# Patient Record
Sex: MALE | Race: WHITE | NOT HISPANIC OR LATINO | Employment: FULL TIME | URBAN - NONMETROPOLITAN AREA
[De-identification: names, ages, dates, MRNs, and addresses within clinical notes are randomized per-mention and may not be internally consistent; named-entity substitution may affect disease eponyms.]

---

## 2017-01-31 ENCOUNTER — OFFICE VISIT (OUTPATIENT)
Dept: FAMILY MEDICINE | Facility: OTHER | Age: 52
End: 2017-01-31
Attending: FAMILY MEDICINE
Payer: COMMERCIAL

## 2017-01-31 VITALS
SYSTOLIC BLOOD PRESSURE: 122 MMHG | RESPIRATION RATE: 17 BRPM | BODY MASS INDEX: 49.73 KG/M2 | WEIGHT: 315 LBS | DIASTOLIC BLOOD PRESSURE: 80 MMHG | HEART RATE: 74 BPM | TEMPERATURE: 97 F

## 2017-01-31 DIAGNOSIS — E11.9 TYPE 2 DIABETES MELLITUS WITHOUT COMPLICATION, WITHOUT LONG-TERM CURRENT USE OF INSULIN (H): Primary | ICD-10-CM

## 2017-01-31 DIAGNOSIS — E78.00 PURE HYPERCHOLESTEROLEMIA: ICD-10-CM

## 2017-01-31 DIAGNOSIS — I10 ESSENTIAL HYPERTENSION WITH GOAL BLOOD PRESSURE LESS THAN 140/90: ICD-10-CM

## 2017-01-31 LAB
ALBUMIN SERPL-MCNC: 3.8 G/DL (ref 3.4–5)
ALP SERPL-CCNC: 74 U/L (ref 40–150)
ALT SERPL W P-5'-P-CCNC: 32 U/L (ref 0–70)
ANION GAP SERPL CALCULATED.3IONS-SCNC: 9 MMOL/L (ref 3–14)
AST SERPL W P-5'-P-CCNC: 14 U/L (ref 0–45)
BILIRUB SERPL-MCNC: 0.3 MG/DL (ref 0.2–1.3)
BUN SERPL-MCNC: 15 MG/DL (ref 7–30)
CALCIUM SERPL-MCNC: 9.5 MG/DL (ref 8.5–10.1)
CHLORIDE SERPL-SCNC: 103 MMOL/L (ref 94–109)
CO2 SERPL-SCNC: 26 MMOL/L (ref 20–32)
CREAT SERPL-MCNC: 0.84 MG/DL (ref 0.66–1.25)
EST. AVERAGE GLUCOSE BLD GHB EST-MCNC: 151 MG/DL
GFR SERPL CREATININE-BSD FRML MDRD: ABNORMAL ML/MIN/1.7M2
GLUCOSE SERPL-MCNC: 116 MG/DL (ref 70–99)
HBA1C MFR BLD: 6.9 % (ref 4.3–6)
POTASSIUM SERPL-SCNC: 3.6 MMOL/L (ref 3.4–5.3)
PROT SERPL-MCNC: 7.4 G/DL (ref 6.8–8.8)
SODIUM SERPL-SCNC: 138 MMOL/L (ref 133–144)
TSH SERPL DL<=0.05 MIU/L-ACNC: 0.78 MU/L (ref 0.4–4)

## 2017-01-31 PROCEDURE — 99214 OFFICE O/P EST MOD 30 MIN: CPT | Performed by: FAMILY MEDICINE

## 2017-01-31 PROCEDURE — 83036 HEMOGLOBIN GLYCOSYLATED A1C: CPT | Performed by: FAMILY MEDICINE

## 2017-01-31 PROCEDURE — 84443 ASSAY THYROID STIM HORMONE: CPT | Performed by: FAMILY MEDICINE

## 2017-01-31 PROCEDURE — 80053 COMPREHEN METABOLIC PANEL: CPT | Performed by: FAMILY MEDICINE

## 2017-01-31 PROCEDURE — 36415 COLL VENOUS BLD VENIPUNCTURE: CPT | Performed by: FAMILY MEDICINE

## 2017-01-31 ASSESSMENT — PAIN SCALES - GENERAL: PAINLEVEL: NO PAIN (0)

## 2017-01-31 NOTE — MR AVS SNAPSHOT
After Visit Summary   1/31/2017    Glen Rogers    MRN: [id number]           Patient Information     Date Of Birth          1965        Visit Information        Provider Department      1/31/2017 4:00 PM Zak Schrader MD [facility]        Today's Diagnoses     Type 2 diabetes mellitus without complication, without long-term current use of insulin (H)    -  1     Essential hypertension with goal blood pressure less than 140/90         Pure hypercholesterolemia           Care Instructions      Weight Management: Take It Off and Keep It Off  It s easy to be motivated when you first start. The key is to stay motivated all along the way and to have realistic and achievable goals. There are things you can do to keep yourself on the path to success.    Don t focus on daily weight gains and losses. Instead, weigh yourself no more than once a week at the same time of day.   Stay motivated    Remind yourself of your goals. Post them near the refrigerator or desk.    Make daily entries in your diary or journal about your activity and eating. A visual reminder of success, like a gold star, can help keep you going.    Every week, take time to look back on how much you ve accomplished.    Try taking a nutrition class. It can help you learn new skills and meet new people. You might try a low-fat cooking or yoga class.    Don t be hard on yourself or give up if you slip. Be patient. Learn from your mistakes and adjust your plan if you need to. Then get right back to it.    Be realistic about your goals. Make sure you can achieve them.   Believe that you can do it  How you think about yourself is just as important as what you do. If you don t think you can succeed, chances are you won t. Believe that you can stick to your plan and meet your goals.    If you don t meet a goal, don t use it as an excuse to give up on your whole plan. Adjust your goal and try again.    Learn how to accept  compliments. Even if you get embarrassed, just say  thank you.     Make a list of the things that others like about you and that you like about yourself. Add something new from time to time. Keep this list to look at when you need a lift.  Resources    The President s [place] on Fitness, Sports & Nutritionwww.fitness.gov    Academy of Nutrition and Dieteticswww.eatright.org    Healthfinderwww.healthfinder.gov    8421-0136 [employer]. [address] [place] [address], [place] [address]. All rights reserved. This information is not intended as a substitute for professional medical care. Always follow your healthcare professional's instructions.        Weight Management: Overcoming Your Barriers  You may have many reasons why you re not ready to lose weight. You may not feel you have the time or the skills. You may be afraid of losing weight and gaining it back again. Well, you can lose weight. And you can keep the weight off, if you make changes slowly and stick with them. Remember that you may never find the perfect time to lose weight. Decide that the right time to be healthier is now.    Common barriers  Barrier 1: I don t want to deny myself.  Barrier Buster: You don t have to! Moderation is the key.    Watch portion sizes and know when you're eating more than one serving.    Plan to ask for a doggy bag when you eat out.    Have just one.    Choose lower-fat and lower-calorie versions of your favorites.  Barrier 2: I lost weight before but I gained it right back.  Barrier Buster: Make this time different.    List what worked and didn t work last time and what you can try this time.    Choose changes that you are willing to stick with.    Work exercise into your weight-loss plan.    Be realistic about what is possible.   Barrier 3: I don t have the time to be active.  Barrier Buster: It takes just a few minutes a day!    Be active with a pet or the kids.    Block off activity time in your schedule.    Borrow  some time that you usually spend watching TV.    You are too important not to take time to exercise -- it is your life!   Feel good about yourself  Do you eat more because you feel bad about yourself, then feel even worse as you gain weight? This is a  vicious cycle.  Breaking this cycle is not easy. You may need group support or counseling. Always remember that you are a valuable person, no matter what size or shape you are.  Do you have a health problem? If so, don t use it as an excuse for not losing weight. Ask your doctor, dietitian, or other health care provider about methods to lose weight that are safe for you. For example, even if you have severe arthritis, you can walk in a pool. Get advice from a .      3855-0141 [employer]. [address] [place] [address], [place] [address]. All rights reserved. This information is not intended as a substitute for professional medical care. Always follow your healthcare professional's instructions.        Weight Management: Fact and Fiction  Knowing the truth about losing weight can help you separate what works from what doesn t. Don t be taken in by expensive weight-loss fads like pills, herbs, and special foods that promise unbelievable results. There s no magic way to lose weight. If you have questions about weight loss, ask your health care provider.    Fiction:  The faster I lose weight, the better.   Fact: Rapid weight loss is usually due to loss of water or muscle mass. What you re trying to get rid of is extra fat. Aim to lose a 1/2 pound to 2 pounds a week. Then you re more likely to lose fat rather than water or muscle.  Fiction:  Skipping meals will help me lose weight.   Fact: When you skip meals, you don t give your body the energy it needs to work. Hunger makes you more likely to overeat later on. It s best to spread your meals throughout the day. Eat at least 3 meals a day.  Fiction:  I can t start exercising until I lose  weight.   Fact: The sooner you start exercising the better. Exercise helps burn more calories, tone your muscles, and keep your appetite in check. People who continue to exercise after they lose weight are more likely to keep the weight off.  Fiction:  The fewer calories I eat, the better.   Fact: This seems like it should be true, but it s not. When you eat too few calories, your body acts as if it s on a desert island. It thinks food is scarce, so it slows down your metabolism (how fast you burn calories) to save energy. By eating too few calories, you make it harder to lose weight.  Fiction:  Once I lose weight, I can go back to living the way I did before.   Fact: Going back to your old eating habits and giving up exercise is a sure way to regain any weight you ve lost. The lifestyle changes that help you lose extra weight can also help keep it off. This is why you need to make realistic changes you can stick with.  Fiction:  Low-fat and fat-free mean low-calorie.   Fact: All foods, even fat-free ones, have calories. Eat too many calories and you ll gain weight. It s OK to treat yourself to a fat-free cookie or 2. Just don t eat the whole box!    8113-1030 [employer]. [address] [place] [address], [place] [address]. All rights reserved. This information is not intended as a substitute for professional medical care. Always follow your healthcare professional's instructions.      Results for orders placed or performed in visit on 01/31/17 (from the past 24 hour(s))   Comprehensive metabolic panel   Result Value Ref Range    Sodium 138 133 - 144 mmol/L    Potassium 3.6 3.4 - 5.3 mmol/L    Chloride 103 94 - 109 mmol/L    Carbon Dioxide 26 20 - 32 mmol/L    Anion Gap 9 3 - 14 mmol/L    Glucose 116 (H) 70 - 99 mg/dL    Urea Nitrogen 15 7 - 30 mg/dL    Creatinine 0.84 0.66 - 1.25 mg/dL    GFR Estimate >90  Non  GFR Calc   >60 mL/min/1.7m2    GFR Estimate If Black >90   GFR  "Calc   >60 mL/min/1.7m2    Calcium 9.5 8.5 - 10.1 mg/dL    Bilirubin Total 0.3 0.2 - 1.3 mg/dL    Albumin 3.8 3.4 - 5.0 g/dL    Protein Total 7.4 6.8 - 8.8 g/dL    Alkaline Phosphatase 74 40 - 150 U/L    ALT 32 0 - 70 U/L    AST 14 0 - 45 U/L   Hemoglobin A1c   Result Value Ref Range    Hemoglobin A1C 6.9 (H) 4.3 - 6.0 %   TSH   Result Value Ref Range    TSH 0.78 0.40 - 4.00 mU/L   Estimated Average Glucose   Result Value Ref Range    Estimated Average Glucose 151 mg/dL             Follow-ups after your visit        Who to contact     If you have questions or need follow up information about today's clinic visit or your schedule please contact [facility] directly at [phone].  Normal or non-critical lab and imaging results will be communicated to you by [email or web address], letter or phone within 4 business days after the clinic has received the results. If you do not hear from us within 7 days, please contact the clinic through [facility] or phone. If you have a critical or abnormal lab result, we will notify you by phone as soon as possible.  Submit refill requests through [facility] or call your pharmacy and they will forward the refill request to us. Please allow 3 business days for your refill to be completed.          Additional Information About Your Visit        [email or web address] Information     [employer] lets you send messages to your doctor, view your test results, renew your prescriptions, schedule appointments and more. To sign up, go to [email or web address] . Click on \"Log in\" on the left side of the screen, which will take you to the Welcome page. Then click on \"Sign up Now\" on the right side of the page.     You will be asked to enter the access code listed below, as well as some personal information. Please follow the directions to create your username and password.     Your access code is: [email or web address]  Expires: 2017  4:33 PM     Your access code will  in 90 days. If you need help or a new code, " please call your [facility] clinic or [phone].        Care EveryWhere ID     This is your Care EveryWhere ID. This could be used by other organizations to access your [facility] medical records  [id number]        Your Vitals Were     Pulse Temperature Respirations             74 97  F (36.1  C) 17          Blood Pressure from Last 3 Encounters:   01/31/17 122/80   10/28/16 124/74   07/11/16 132/82    Weight from Last 3 Encounters:   01/31/17 372 lb (168.738 kg)   10/28/16 360 lb (163.295 kg)   07/11/16 363 lb (164.656 kg)              We Performed the Following     Comprehensive metabolic panel     Estimated Average Glucose     Hemoglobin A1c     TSH        Primary Care Provider Office Phone # Fax #    Zak Schrader [phone]       [facility] [address] [facility]  [facility] [address]        Thank you!     Thank you for choosing [facility]  for your care. Our goal is always to provide you with excellent care. Hearing back from our patients is one way we can continue to improve our services. Please take a few minutes to complete the written survey that you may receive in the mail after your visit with us. Thank you!             Your Updated Medication List - Protect others around you: Learn how to safely use, store and throw away your medicines at www.disposemymeds.org.          This list is accurate as of: 1/31/17  4:33 PM.  Always use your most recent med list.                   Brand Name Dispense Instructions for use    blood glucose monitoring lancets     1 Box    Use to test blood sugar 3 times daily or as directed.       blood glucose monitoring test strip    DORIAN CONTOUR NEXT    2 Box    Use to test blood sugar 3 times daily or as directed.       losartan-hydrochlorothiazide 50-12.5 MG per tablet    HYZAAR    30 tablet    TAKE 1 TABLET BY MOUTH DAILY       metFORMIN 500 MG 24 hr tablet    GLUCOPHAGE-XR    180 tablet    Take 2 tablets (1,000 mg) by mouth daily (with  dinner)       simvastatin 20 MG tablet    ZOCOR    90 tablet    Take 1 tablet (20 mg) by mouth At Bedtime

## 2017-01-31 NOTE — NURSING NOTE
"Chief Complaint   Patient presents with     Diabetes     Hypertension     Lipids       Initial /79 mmHg  Pulse 74  Temp(Src) 97  F (36.1  C)  Resp 17  Wt 372 lb (168.738 kg) Estimated body mass index is 49.73 kg/(m^2) as calculated from the following:    Height as of 7/11/16: 6' 0.5\" (1.842 m).    Weight as of this encounter: 372 lb (168.738 kg).  BP completed using cuff size: large  "

## 2017-01-31 NOTE — PATIENT INSTRUCTIONS
Weight Management: Take It Off and Keep It Off  It s easy to be motivated when you first start. The key is to stay motivated all along the way and to have realistic and achievable goals. There are things you can do to keep yourself on the path to success.    Don t focus on daily weight gains and losses. Instead, weigh yourself no more than once a week at the same time of day.   Stay motivated    Remind yourself of your goals. Post them near the refrigerator or desk.    Make daily entries in your diary or journal about your activity and eating. A visual reminder of success, like a gold star, can help keep you going.    Every week, take time to look back on how much you ve accomplished.    Try taking a nutrition class. It can help you learn new skills and meet new people. You might try a low-fat cooking or yoga class.    Don t be hard on yourself or give up if you slip. Be patient. Learn from your mistakes and adjust your plan if you need to. Then get right back to it.    Be realistic about your goals. Make sure you can achieve them.   Believe that you can do it  How you think about yourself is just as important as what you do. If you don t think you can succeed, chances are you won t. Believe that you can stick to your plan and meet your goals.    If you don t meet a goal, don t use it as an excuse to give up on your whole plan. Adjust your goal and try again.    Learn how to accept compliments. Even if you get embarrassed, just say  thank you.     Make a list of the things that others like about you and that you like about yourself. Add something new from time to time. Keep this list to look at when you need a lift.  Resources    The President s [place] on Fitness, Sports & Nutritionwww.fitness.gov    Academy of Nutrition and Dieteticswww.eatright.org    Healthfinderwww.healthfinder.gov    8278-2033 [employer]. [address] [place] [address], [place] [address]. All rights reserved. This information is not  intended as a substitute for professional medical care. Always follow your healthcare professional's instructions.        Weight Management: Overcoming Your Barriers  You may have many reasons why you re not ready to lose weight. You may not feel you have the time or the skills. You may be afraid of losing weight and gaining it back again. Well, you can lose weight. And you can keep the weight off, if you make changes slowly and stick with them. Remember that you may never find the perfect time to lose weight. Decide that the right time to be healthier is now.    Common barriers  Barrier 1: I don t want to deny myself.  Barrier Buster: You don t have to! Moderation is the key.    Watch portion sizes and know when you're eating more than one serving.    Plan to ask for a doggy bag when you eat out.    Have just one.    Choose lower-fat and lower-calorie versions of your favorites.  Barrier 2: I lost weight before but I gained it right back.  Barrier Buster: Make this time different.    List what worked and didn t work last time and what you can try this time.    Choose changes that you are willing to stick with.    Work exercise into your weight-loss plan.    Be realistic about what is possible.   Barrier 3: I don t have the time to be active.  Barrier Buster: It takes just a few minutes a day!    Be active with a pet or the kids.    Block off activity time in your schedule.    Borrow some time that you usually spend watching TV.    You are too important not to take time to exercise -- it is your life!   Feel good about yourself  Do you eat more because you feel bad about yourself, then feel even worse as you gain weight? This is a  vicious cycle.  Breaking this cycle is not easy. You may need group support or counseling. Always remember that you are a valuable person, no matter what size or shape you are.  Do you have a health problem? If so, don t use it as an excuse for not losing weight. Ask your doctor,  dietitian, or other health care provider about methods to lose weight that are safe for you. For example, even if you have severe arthritis, you can walk in a pool. Get advice from a .      9405-9720 [employer]. [address] [place] [address], [place] [address]. All rights reserved. This information is not intended as a substitute for professional medical care. Always follow your healthcare professional's instructions.        Weight Management: Fact and Fiction  Knowing the truth about losing weight can help you separate what works from what doesn t. Don t be taken in by expensive weight-loss fads like pills, herbs, and special foods that promise unbelievable results. There s no magic way to lose weight. If you have questions about weight loss, ask your health care provider.    Fiction:  The faster I lose weight, the better.   Fact: Rapid weight loss is usually due to loss of water or muscle mass. What you re trying to get rid of is extra fat. Aim to lose a 1/2 pound to 2 pounds a week. Then you re more likely to lose fat rather than water or muscle.  Fiction:  Skipping meals will help me lose weight.   Fact: When you skip meals, you don t give your body the energy it needs to work. Hunger makes you more likely to overeat later on. It s best to spread your meals throughout the day. Eat at least 3 meals a day.  Fiction:  I can t start exercising until I lose weight.   Fact: The sooner you start exercising the better. Exercise helps burn more calories, tone your muscles, and keep your appetite in check. People who continue to exercise after they lose weight are more likely to keep the weight off.  Fiction:  The fewer calories I eat, the better.   Fact: This seems like it should be true, but it s not. When you eat too few calories, your body acts as if it s on a desert island. It thinks food is scarce, so it slows down your metabolism (how fast you burn calories) to save energy. By eating too  few calories, you make it harder to lose weight.  Fiction:  Once I lose weight, I can go back to living the way I did before.   Fact: Going back to your old eating habits and giving up exercise is a sure way to regain any weight you ve lost. The lifestyle changes that help you lose extra weight can also help keep it off. This is why you need to make realistic changes you can stick with.  Fiction:  Low-fat and fat-free mean low-calorie.   Fact: All foods, even fat-free ones, have calories. Eat too many calories and you ll gain weight. It s OK to treat yourself to a fat-free cookie or 2. Just don t eat the whole box!    8133-1967 [employer]. [address] [place] [address], [place] [address]. All rights reserved. This information is not intended as a substitute for professional medical care. Always follow your healthcare professional's instructions.      Results for orders placed or performed in visit on 01/31/17 (from the past 24 hour(s))   Comprehensive metabolic panel   Result Value Ref Range    Sodium 138 133 - 144 mmol/L    Potassium 3.6 3.4 - 5.3 mmol/L    Chloride 103 94 - 109 mmol/L    Carbon Dioxide 26 20 - 32 mmol/L    Anion Gap 9 3 - 14 mmol/L    Glucose 116 (H) 70 - 99 mg/dL    Urea Nitrogen 15 7 - 30 mg/dL    Creatinine 0.84 0.66 - 1.25 mg/dL    GFR Estimate >90  Non  GFR Calc   >60 mL/min/1.7m2    GFR Estimate If Black >90   GFR Calc   >60 mL/min/1.7m2    Calcium 9.5 8.5 - 10.1 mg/dL    Bilirubin Total 0.3 0.2 - 1.3 mg/dL    Albumin 3.8 3.4 - 5.0 g/dL    Protein Total 7.4 6.8 - 8.8 g/dL    Alkaline Phosphatase 74 40 - 150 U/L    ALT 32 0 - 70 U/L    AST 14 0 - 45 U/L   Hemoglobin A1c   Result Value Ref Range    Hemoglobin A1C 6.9 (H) 4.3 - 6.0 %   TSH   Result Value Ref Range    TSH 0.78 0.40 - 4.00 mU/L   Estimated Average Glucose   Result Value Ref Range    Estimated Average Glucose 151 mg/dL

## 2017-01-31 NOTE — PROGRESS NOTES
SUBJECTIVE:                                                    Glen Rogers is a 51 year old male who presents to clinic today for the following health issues:        Diabetes Follow-up      Patient is checking blood sugars: not at all    Diabetic concerns: None     Symptoms of hypoglycemia (low blood sugar): none     Paresthesias (numbness or burning in feet) or sores: No     Date of last diabetic eye exam:over 1 year      Hyperlipidemia Follow-Up      Rate your low fat/cholesterol diet?: fair    Taking statin?  Yes, no muscle aches from statin    Other lipid medications/supplements?:  none     Hypertension Follow-up      Outpatient blood pressures are not being checked.    Low Salt Diet: no added salt         Amount of exercise or physical activity: None    Problems taking medications regularly: No    Medication side effects: none    Diet: regular (no restrictions)        Problem list and histories reviewed & adjusted, as indicated.  Additional history: as documented    Problem list, Medication list, Allergies, and Medical/Social/Surgical histories reviewed in EPIC and updated as appropriate.    ROS:  C: NEGATIVE for fever, chills, change in weight  R: NEGATIVE for significant cough or SOB  CV: NEGATIVE for chest pain, palpitations or peripheral edema    OBJECTIVE:                                                    /80 mmHg  Pulse 74  Temp(Src) 97  F (36.1  C)  Resp 17  Wt 372 lb (168.738 kg)  Body mass index is 49.73 kg/(m^2).   GENERAL: healthy, alert, well nourished, well hydrated, no distress  NECK: no tenderness, no adenopathy, no asymmetry, no masses, no stiffness; thyroid- normal to palpation  RESP: lungs clear to auscultation - no rales, no rhonchi, no wheezes  CV: regular rates and rhythm, normal S1 S2, no S3 or S4 and no murmur, no click or rub -             ASSESSMENT/PLAN:                                                    1. Type 2 diabetes mellitus without complication, without  long-term current use of insulin (H)  Eye exam is due- discussed. Good control overall. WT is climbing though- discussed. Continue current medications and behavioral changes.   F/u in 4 -5months with fasting labs.   - Comprehensive metabolic panel; Future  - Lipid Profile; Future  - Hemoglobin A1c; Future  - TSH; Future  - Comprehensive metabolic panel  - Hemoglobin A1c  - TSH    2. Essential hypertension with goal blood pressure less than 140/90  Overall good control - Continue current medications and behavioral changes.   - Comprehensive metabolic panel; Future  - Lipid Profile; Future  - Hemoglobin A1c; Future  - TSH; Future  - Comprehensive metabolic panel  - Hemoglobin A1c  - TSH    3. Pure hypercholesterolemia  On statin - will check lipids next time - not fasting today   - Comprehensive metabolic panel; Future  - Lipid Profile; Future  - Hemoglobin A1c; Future  - TSH; Future  - Comprehensive metabolic panel  - Hemoglobin A1c  - TSH    Obesity - wt is climbing. Discussed behavioral changes to improve obesity including increase diet, decreasing carbs along with other changes in diet and consider seeing dietician or enroll in a watch watcher's type program.     See Patient Instructions    Zak Schrader MD  [facility]

## 2017-04-03 DIAGNOSIS — E11.9 TYPE 2 DIABETES MELLITUS WITHOUT COMPLICATION (H): ICD-10-CM

## 2017-04-05 NOTE — TELEPHONE ENCOUNTER
Metformin         Last Written Prescription Date: 12/12/2016  Last Fill Quantity: 180, # refills: 0  Last Office Visit with G, P or [facility] prescribing provider:  01/31/2017        BP Readings from Last 3 Encounters:   01/31/17 122/80   10/28/16 124/74   07/11/16 132/82     No results found for: MICROL  No results found for: UMALCR  Creatinine   Date Value Ref Range Status   01/31/2017 0.84 0.66 - 1.25 mg/dL Final   ]  GFR Estimate   Date Value Ref Range Status   01/31/2017 >90  Non  GFR Calc   >60 mL/min/1.7m2 Final   04/06/2016 >90  Non  GFR Calc   >60 mL/min/1.7m2 Final   07/20/2015 >90  Non  GFR Calc   >60 mL/min/1.7m2 Final     GFR Estimate If Black   Date Value Ref Range Status   01/31/2017 >90   GFR Calc   >60 mL/min/1.7m2 Final   04/06/2016 >90   GFR Calc   >60 mL/min/1.7m2 Final   07/20/2015 >90   GFR Calc   >60 mL/min/1.7m2 Final     Lab Results   Component Value Date    CHOL 163 07/11/2016     Lab Results   Component Value Date    HDL 49 07/11/2016     Lab Results   Component Value Date    LDL 88 07/11/2016     Lab Results   Component Value Date    TRIG 131 07/11/2016     Lab Results   Component Value Date    CHOLHDLRATIO 3.7 07/20/2015     Lab Results   Component Value Date    AST 14 01/31/2017     Lab Results   Component Value Date    ALT 32 01/31/2017     Lab Results   Component Value Date    A1C 6.9 01/31/2017    A1C 6.8 10/28/2016    A1C 6.4 07/11/2016    A1C 7.0 04/06/2016    A1C 6.6 11/23/2015     Potassium   Date Value Ref Range Status   01/31/2017 3.6 3.4 - 5.3 mmol/L Final

## 2017-04-06 RX ORDER — METFORMIN HCL 500 MG
TABLET, EXTENDED RELEASE 24 HR ORAL
Qty: 180 TABLET | Refills: 0 | Status: SHIPPED | OUTPATIENT
Start: 2017-04-06 | End: 2017-07-10

## 2017-05-18 DIAGNOSIS — I10 ESSENTIAL HYPERTENSION: ICD-10-CM

## 2017-05-19 RX ORDER — LOSARTAN POTASSIUM AND HYDROCHLOROTHIAZIDE 12.5; 5 MG/1; MG/1
TABLET ORAL
Qty: 30 TABLET | Refills: 5 | Status: SHIPPED | OUTPATIENT
Start: 2017-05-19 | End: 2017-07-10

## 2017-07-10 ENCOUNTER — OFFICE VISIT (OUTPATIENT)
Dept: FAMILY MEDICINE | Facility: OTHER | Age: 52
End: 2017-07-10
Attending: FAMILY MEDICINE
Payer: COMMERCIAL

## 2017-07-10 VITALS
HEIGHT: 72 IN | HEART RATE: 78 BPM | BODY MASS INDEX: 42.66 KG/M2 | WEIGHT: 315 LBS | SYSTOLIC BLOOD PRESSURE: 132 MMHG | TEMPERATURE: 97.7 F | DIASTOLIC BLOOD PRESSURE: 82 MMHG

## 2017-07-10 DIAGNOSIS — E66.01 MORBID OBESITY DUE TO EXCESS CALORIES (H): ICD-10-CM

## 2017-07-10 DIAGNOSIS — E78.00 PURE HYPERCHOLESTEROLEMIA: ICD-10-CM

## 2017-07-10 DIAGNOSIS — I10 ESSENTIAL HYPERTENSION WITH GOAL BLOOD PRESSURE LESS THAN 140/90: ICD-10-CM

## 2017-07-10 DIAGNOSIS — E11.9 TYPE 2 DIABETES MELLITUS WITHOUT COMPLICATION, WITHOUT LONG-TERM CURRENT USE OF INSULIN (H): Primary | ICD-10-CM

## 2017-07-10 DIAGNOSIS — I10 ESSENTIAL HYPERTENSION: ICD-10-CM

## 2017-07-10 LAB
ALBUMIN SERPL-MCNC: 3.9 G/DL (ref 3.4–5)
ALP SERPL-CCNC: 79 U/L (ref 40–150)
ALT SERPL W P-5'-P-CCNC: 39 U/L (ref 0–70)
ANION GAP SERPL CALCULATED.3IONS-SCNC: 7 MMOL/L (ref 3–14)
AST SERPL W P-5'-P-CCNC: 17 U/L (ref 0–45)
BASOPHILS # BLD AUTO: 0 10E9/L (ref 0–0.2)
BASOPHILS NFR BLD AUTO: 0.4 %
BILIRUB SERPL-MCNC: 0.5 MG/DL (ref 0.2–1.3)
BUN SERPL-MCNC: 12 MG/DL (ref 7–30)
CALCIUM SERPL-MCNC: 9.5 MG/DL (ref 8.5–10.1)
CHLORIDE SERPL-SCNC: 105 MMOL/L (ref 94–109)
CHOLEST SERPL-MCNC: 192 MG/DL
CO2 SERPL-SCNC: 27 MMOL/L (ref 20–32)
CREAT SERPL-MCNC: 0.87 MG/DL (ref 0.66–1.25)
CREAT UR-MCNC: 130 MG/DL
DIFFERENTIAL METHOD BLD: NORMAL
EOSINOPHIL # BLD AUTO: 0.2 10E9/L (ref 0–0.7)
EOSINOPHIL NFR BLD AUTO: 1.9 %
ERYTHROCYTE [DISTWIDTH] IN BLOOD BY AUTOMATED COUNT: 14.1 % (ref 10–15)
EST. AVERAGE GLUCOSE BLD GHB EST-MCNC: 148 MG/DL
GFR SERPL CREATININE-BSD FRML MDRD: ABNORMAL ML/MIN/1.7M2
GLUCOSE SERPL-MCNC: 104 MG/DL (ref 70–99)
HBA1C MFR BLD: 6.8 % (ref 4.3–6)
HCT VFR BLD AUTO: 40.7 % (ref 40–53)
HDLC SERPL-MCNC: 55 MG/DL
HGB BLD-MCNC: 14.1 G/DL (ref 13.3–17.7)
IMM GRANULOCYTES # BLD: 0 10E9/L (ref 0–0.4)
IMM GRANULOCYTES NFR BLD: 0.2 %
LDLC SERPL CALC-MCNC: 120 MG/DL
LYMPHOCYTES # BLD AUTO: 2.3 10E9/L (ref 0.8–5.3)
LYMPHOCYTES NFR BLD AUTO: 28 %
MCH RBC QN AUTO: 29.9 PG (ref 26.5–33)
MCHC RBC AUTO-ENTMCNC: 34.6 G/DL (ref 31.5–36.5)
MCV RBC AUTO: 86 FL (ref 78–100)
MICROALBUMIN UR-MCNC: 7 MG/L
MICROALBUMIN/CREAT UR: 5.32 MG/G CR (ref 0–17)
MONOCYTES # BLD AUTO: 0.6 10E9/L (ref 0–1.3)
MONOCYTES NFR BLD AUTO: 6.6 %
NEUTROPHILS # BLD AUTO: 5.2 10E9/L (ref 1.6–8.3)
NEUTROPHILS NFR BLD AUTO: 62.9 %
NONHDLC SERPL-MCNC: 137 MG/DL
NRBC # BLD AUTO: 0 10*3/UL
NRBC BLD AUTO-RTO: 0 /100
PLATELET # BLD AUTO: 264 10E9/L (ref 150–450)
POTASSIUM SERPL-SCNC: 4 MMOL/L (ref 3.4–5.3)
PROT SERPL-MCNC: 7.5 G/DL (ref 6.8–8.8)
RBC # BLD AUTO: 4.72 10E12/L (ref 4.4–5.9)
SODIUM SERPL-SCNC: 139 MMOL/L (ref 133–144)
TRIGL SERPL-MCNC: 84 MG/DL
WBC # BLD AUTO: 8.3 10E9/L (ref 4–11)

## 2017-07-10 PROCEDURE — 82043 UR ALBUMIN QUANTITATIVE: CPT | Performed by: FAMILY MEDICINE

## 2017-07-10 PROCEDURE — 36415 COLL VENOUS BLD VENIPUNCTURE: CPT | Performed by: FAMILY MEDICINE

## 2017-07-10 PROCEDURE — 80053 COMPREHEN METABOLIC PANEL: CPT | Performed by: FAMILY MEDICINE

## 2017-07-10 PROCEDURE — 85025 COMPLETE CBC W/AUTO DIFF WBC: CPT | Performed by: FAMILY MEDICINE

## 2017-07-10 PROCEDURE — 99214 OFFICE O/P EST MOD 30 MIN: CPT | Performed by: FAMILY MEDICINE

## 2017-07-10 PROCEDURE — 83036 HEMOGLOBIN GLYCOSYLATED A1C: CPT | Performed by: FAMILY MEDICINE

## 2017-07-10 PROCEDURE — 80061 LIPID PANEL: CPT | Performed by: FAMILY MEDICINE

## 2017-07-10 RX ORDER — SIMVASTATIN 20 MG
20 TABLET ORAL AT BEDTIME
Qty: 90 TABLET | Refills: 3 | Status: CANCELLED | OUTPATIENT
Start: 2017-07-10

## 2017-07-10 RX ORDER — LOSARTAN POTASSIUM AND HYDROCHLOROTHIAZIDE 12.5; 5 MG/1; MG/1
1 TABLET ORAL DAILY
Qty: 90 TABLET | Refills: 1 | Status: SHIPPED | OUTPATIENT
Start: 2017-07-10 | End: 2018-01-05

## 2017-07-10 RX ORDER — METFORMIN HCL 500 MG
1000 TABLET, EXTENDED RELEASE 24 HR ORAL
Qty: 180 TABLET | Refills: 1 | Status: SHIPPED | OUTPATIENT
Start: 2017-07-10 | End: 2018-04-17

## 2017-07-10 RX ORDER — ATORVASTATIN CALCIUM 10 MG/1
10 TABLET, FILM COATED ORAL DAILY
Qty: 90 TABLET | Refills: 1 | Status: SHIPPED | OUTPATIENT
Start: 2017-07-10 | End: 2018-01-05

## 2017-07-10 ASSESSMENT — ANXIETY QUESTIONNAIRES
IF YOU CHECKED OFF ANY PROBLEMS ON THIS QUESTIONNAIRE, HOW DIFFICULT HAVE THESE PROBLEMS MADE IT FOR YOU TO DO YOUR WORK, TAKE CARE OF THINGS AT HOME, OR GET ALONG WITH OTHER PEOPLE: SOMEWHAT DIFFICULT
GAD7 TOTAL SCORE: 9
7. FEELING AFRAID AS IF SOMETHING AWFUL MIGHT HAPPEN: SEVERAL DAYS
2. NOT BEING ABLE TO STOP OR CONTROL WORRYING: MORE THAN HALF THE DAYS
1. FEELING NERVOUS, ANXIOUS, OR ON EDGE: SEVERAL DAYS
5. BEING SO RESTLESS THAT IT IS HARD TO SIT STILL: NOT AT ALL
3. WORRYING TOO MUCH ABOUT DIFFERENT THINGS: MORE THAN HALF THE DAYS
6. BECOMING EASILY ANNOYED OR IRRITABLE: MORE THAN HALF THE DAYS

## 2017-07-10 ASSESSMENT — PATIENT HEALTH QUESTIONNAIRE - PHQ9: 5. POOR APPETITE OR OVEREATING: SEVERAL DAYS

## 2017-07-10 ASSESSMENT — PAIN SCALES - GENERAL: PAINLEVEL: NO PAIN (0)

## 2017-07-10 NOTE — PATIENT INSTRUCTIONS
Results for orders placed or performed in visit on 07/10/17 (from the past 24 hour(s))   CBC with platelets differential   Result Value Ref Range    WBC 8.3 4.0 - 11.0 10e9/L    RBC Count 4.72 4.4 - 5.9 10e12/L    Hemoglobin 14.1 13.3 - 17.7 g/dL    Hematocrit 40.7 40.0 - 53.0 %    MCV 86 78 - 100 fl    MCH 29.9 26.5 - 33.0 pg    MCHC 34.6 31.5 - 36.5 g/dL    RDW 14.1 10.0 - 15.0 %    Platelet Count 264 150 - 450 10e9/L    Diff Method Automated Method     % Neutrophils 62.9 %    % Lymphocytes 28.0 %    % Monocytes 6.6 %    % Eosinophils 1.9 %    % Basophils 0.4 %    % Immature Granulocytes 0.2 %    Nucleated RBCs 0 0 /100    Absolute Neutrophil 5.2 1.6 - 8.3 10e9/L    Absolute Lymphocytes 2.3 0.8 - 5.3 10e9/L    Absolute Monocytes 0.6 0.0 - 1.3 10e9/L    Absolute Eosinophils 0.2 0.0 - 0.7 10e9/L    Absolute Basophils 0.0 0.0 - 0.2 10e9/L    Abs Immature Granulocytes 0.0 0 - 0.4 10e9/L    Absolute Nucleated RBC 0.0    Comprehensive metabolic panel   Result Value Ref Range    Sodium 139 133 - 144 mmol/L    Potassium 4.0 3.4 - 5.3 mmol/L    Chloride 105 94 - 109 mmol/L    Carbon Dioxide 27 20 - 32 mmol/L    Anion Gap 7 3 - 14 mmol/L    Glucose 104 (H) 70 - 99 mg/dL    Urea Nitrogen 12 7 - 30 mg/dL    Creatinine 0.87 0.66 - 1.25 mg/dL    GFR Estimate >90  Non  GFR Calc   >60 mL/min/1.7m2    GFR Estimate If Black >90   GFR Calc   >60 mL/min/1.7m2    Calcium 9.5 8.5 - 10.1 mg/dL    Bilirubin Total 0.5 0.2 - 1.3 mg/dL    Albumin 3.9 3.4 - 5.0 g/dL    Protein Total 7.5 6.8 - 8.8 g/dL    Alkaline Phosphatase 79 40 - 150 U/L    ALT 39 0 - 70 U/L    AST 17 0 - 45 U/L   Lipid Profile   Result Value Ref Range    Cholesterol 192 <200 mg/dL    Triglycerides 84 <150 mg/dL    HDL Cholesterol 55 >39 mg/dL    LDL Cholesterol Calculated 120 (H) <100 mg/dL    Non HDL Cholesterol 137 (H) <130 mg/dL   Hemoglobin A1c   Result Value Ref Range    Hemoglobin A1C 6.8 (H) 4.3 - 6.0 %   Estimated Average Glucose    Result Value Ref Range    Estimated Average Glucose 148 mg/dL

## 2017-07-10 NOTE — NURSING NOTE
Chief Complaint   Patient presents with     Diabetes     Lipids       Initial /82  Pulse 78  Temp 97.7  F (36.5  C)  Ht 6' (1.829 m)  Wt (!) 340 lb (154.2 kg)  BMI 46.11 kg/m2 Estimated body mass index is 46.11 kg/(m^2) as calculated from the following:    Height as of this encounter: 6' (1.829 m).    Weight as of this encounter: 340 lb (154.2 kg).  Medication Reconciliation: complete     Steven Deras

## 2017-07-10 NOTE — PROGRESS NOTES
SUBJECTIVE:                                                    Glen Rogers is a 51 year old male who presents to clinic today for the following health issues:      Diabetes Follow-up      Patient is checking blood sugars: not at all    Diabetic concerns: None     Symptoms of hypoglycemia (low blood sugar): none     Paresthesias (numbness or burning in feet) or sores: No     Date of last diabetic eye exam: few yaers    Hyperlipidemia Follow-Up      Rate your low fat/cholesterol diet?: fair, has not been taking statin on regular basis, has a hard time walking and feels fatigue.    Taking statin?  Yes, muscle aches after starting statin    Other lipid medications/supplements?:  None    Stopped zocor and muscle pain and stiffness improved by 50-60%    Hypertension Follow-up      Outpatient blood pressures are not being checked.    Low Salt Diet: no added salt      Amount of exercise or physical activity: 2-3 days/week for an average of 45-60 minutes    Problems taking medications regularly: Yes,  side effects    Medication side effects: muscle aches    Diet: carbohydrate counting          Problem list and histories reviewed & adjusted, as indicated.  Additional history: as documented    Labs reviewed in EPIC    Reviewed and updated as needed this visit by clinical staff  Tobacco  Allergies  Meds  Med Hx  Surg Hx  Fam Hx  Soc Hx      Reviewed and updated as needed this visit by Provider         ROS:  C: NEGATIVE for fever, chills, change in weight  R: NEGATIVE for significant cough or SOB  CV: NEGATIVE for chest pain, palpitations or peripheral edema    OBJECTIVE:                                                    /82  Pulse 78  Temp 97.7  F (36.5  C)  Ht 6' (1.829 m)  Wt (!) 340 lb (154.2 kg)  BMI 46.11 kg/m2  Body mass index is 46.11 kg/(m^2).   GENERAL: healthy, alert, well nourished, well hydrated, no distress  NECK: no tenderness, no adenopathy, no asymmetry, no masses, no stiffness; thyroid-  normal to palpation  RESP: lungs clear to auscultation - no rales, no rhonchi, no wheezes  CV: regular rates and rhythm, normal S1 S2, no S3 or S4 and no murmur, no click or rub -  ABDOMEN: soft, no tenderness, no  hepatosplenomegaly, no masses, normal bowel sounds    Results for orders placed or performed in visit on 07/10/17 (from the past 24 hour(s))   CBC with platelets differential   Result Value Ref Range    WBC 8.3 4.0 - 11.0 10e9/L    RBC Count 4.72 4.4 - 5.9 10e12/L    Hemoglobin 14.1 13.3 - 17.7 g/dL    Hematocrit 40.7 40.0 - 53.0 %    MCV 86 78 - 100 fl    MCH 29.9 26.5 - 33.0 pg    MCHC 34.6 31.5 - 36.5 g/dL    RDW 14.1 10.0 - 15.0 %    Platelet Count 264 150 - 450 10e9/L    Diff Method Automated Method     % Neutrophils 62.9 %    % Lymphocytes 28.0 %    % Monocytes 6.6 %    % Eosinophils 1.9 %    % Basophils 0.4 %    % Immature Granulocytes 0.2 %    Nucleated RBCs 0 0 /100    Absolute Neutrophil 5.2 1.6 - 8.3 10e9/L    Absolute Lymphocytes 2.3 0.8 - 5.3 10e9/L    Absolute Monocytes 0.6 0.0 - 1.3 10e9/L    Absolute Eosinophils 0.2 0.0 - 0.7 10e9/L    Absolute Basophils 0.0 0.0 - 0.2 10e9/L    Abs Immature Granulocytes 0.0 0 - 0.4 10e9/L    Absolute Nucleated RBC 0.0    Comprehensive metabolic panel   Result Value Ref Range    Sodium 139 133 - 144 mmol/L    Potassium 4.0 3.4 - 5.3 mmol/L    Chloride 105 94 - 109 mmol/L    Carbon Dioxide 27 20 - 32 mmol/L    Anion Gap 7 3 - 14 mmol/L    Glucose 104 (H) 70 - 99 mg/dL    Urea Nitrogen 12 7 - 30 mg/dL    Creatinine 0.87 0.66 - 1.25 mg/dL    GFR Estimate >90  Non  GFR Calc   >60 mL/min/1.7m2    GFR Estimate If Black >90   GFR Calc   >60 mL/min/1.7m2    Calcium 9.5 8.5 - 10.1 mg/dL    Bilirubin Total 0.5 0.2 - 1.3 mg/dL    Albumin 3.9 3.4 - 5.0 g/dL    Protein Total 7.5 6.8 - 8.8 g/dL    Alkaline Phosphatase 79 40 - 150 U/L    ALT 39 0 - 70 U/L    AST 17 0 - 45 U/L   Lipid Profile   Result Value Ref Range    Cholesterol 192 <200  mg/dL    Triglycerides 84 <150 mg/dL    HDL Cholesterol 55 >39 mg/dL    LDL Cholesterol Calculated 120 (H) <100 mg/dL    Non HDL Cholesterol 137 (H) <130 mg/dL   Hemoglobin A1c   Result Value Ref Range    Hemoglobin A1C 6.8 (H) 4.3 - 6.0 %   Estimated Average Glucose   Result Value Ref Range    Estimated Average Glucose 148 mg/dL          ASSESSMENT/PLAN:                                                    1. Type 2 diabetes mellitus without complication, without long-term current use of insulin (H)  Good control.  Continue current medications and behavioral changes.   Wt loss noted- Awesome f/u in 5 months   - CBC with platelets differential; Future  - Comprehensive metabolic panel; Future  - Lipid Profile; Future  - Hemoglobin A1c; Future  - Albumin Random Urine Quantitative; Future  - CBC with platelets differential  - Comprehensive metabolic panel  - Lipid Profile  - Hemoglobin A1c  - Albumin Random Urine Quantitative    2. Essential hypertension with goal blood pressure less than 140/90  Stable - Continue current medications and behavioral changes.   - CBC with platelets differential; Future  - Comprehensive metabolic panel; Future  - Lipid Profile; Future  - Hemoglobin A1c; Future  - Albumin Random Urine Quantitative; Future  - CBC with platelets differential  - Comprehensive metabolic panel  - Lipid Profile  - Hemoglobin A1c  - Albumin Random Urine Quantitative    3. Morbid obesity due to excess calories (H)  Wt loss noted - keep up good work  - CBC with platelets differential; Future  - Comprehensive metabolic panel; Future  - Lipid Profile; Future  - Hemoglobin A1c; Future  - Albumin Random Urine Quantitative; Future  - CBC with platelets differential  - Comprehensive metabolic panel  - Lipid Profile  - Hemoglobin A1c  - Albumin Random Urine Quantitative    4. Pure hypercholesterolemia  Will try another statin - pt agreed. Start on low dose. Labs in 5months   - CBC with platelets differential; Future  -  Comprehensive metabolic panel; Future  - Lipid Profile; Future  - Hemoglobin A1c; Future  - Albumin Random Urine Quantitative; Future  - CBC with platelets differential  - Comprehensive metabolic panel  - Lipid Profile  - Hemoglobin A1c  - Albumin Random Urine Quantitative      See Patient Instructions    Zak Schrader MD  [facility], [place]

## 2017-07-10 NOTE — MR AVS SNAPSHOT
After Visit Summary   7/10/2017    Glen Rogers    MRN: [id number]           Patient Information     Date Of Birth          1965        Visit Information        Provider Department      7/10/2017 4:00 PM Zak Schrader MD [facility]        Today's Diagnoses     Type 2 diabetes mellitus without complication, without long-term current use of insulin (H)    -  1    Essential hypertension with goal blood pressure less than 140/90        Morbid obesity due to excess calories (H)        Pure hypercholesterolemia        Essential hypertension          Care Instructions    Results for orders placed or performed in visit on 07/10/17 (from the past 24 hour(s))   CBC with platelets differential   Result Value Ref Range    WBC 8.3 4.0 - 11.0 10e9/L    RBC Count 4.72 4.4 - 5.9 10e12/L    Hemoglobin 14.1 13.3 - 17.7 g/dL    Hematocrit 40.7 40.0 - 53.0 %    MCV 86 78 - 100 fl    MCH 29.9 26.5 - 33.0 pg    MCHC 34.6 31.5 - 36.5 g/dL    RDW 14.1 10.0 - 15.0 %    Platelet Count 264 150 - 450 10e9/L    Diff Method Automated Method     % Neutrophils 62.9 %    % Lymphocytes 28.0 %    % Monocytes 6.6 %    % Eosinophils 1.9 %    % Basophils 0.4 %    % Immature Granulocytes 0.2 %    Nucleated RBCs 0 0 /100    Absolute Neutrophil 5.2 1.6 - 8.3 10e9/L    Absolute Lymphocytes 2.3 0.8 - 5.3 10e9/L    Absolute Monocytes 0.6 0.0 - 1.3 10e9/L    Absolute Eosinophils 0.2 0.0 - 0.7 10e9/L    Absolute Basophils 0.0 0.0 - 0.2 10e9/L    Abs Immature Granulocytes 0.0 0 - 0.4 10e9/L    Absolute Nucleated RBC 0.0    Comprehensive metabolic panel   Result Value Ref Range    Sodium 139 133 - 144 mmol/L    Potassium 4.0 3.4 - 5.3 mmol/L    Chloride 105 94 - 109 mmol/L    Carbon Dioxide 27 20 - 32 mmol/L    Anion Gap 7 3 - 14 mmol/L    Glucose 104 (H) 70 - 99 mg/dL    Urea Nitrogen 12 7 - 30 mg/dL    Creatinine 0.87 0.66 - 1.25 mg/dL    GFR Estimate >90  Non  GFR Calc   >60 mL/min/1.7m2    GFR Estimate  "If Black >90   GFR Calc   >60 mL/min/1.7m2    Calcium 9.5 8.5 - 10.1 mg/dL    Bilirubin Total 0.5 0.2 - 1.3 mg/dL    Albumin 3.9 3.4 - 5.0 g/dL    Protein Total 7.5 6.8 - 8.8 g/dL    Alkaline Phosphatase 79 40 - 150 U/L    ALT 39 0 - 70 U/L    AST 17 0 - 45 U/L   Lipid Profile   Result Value Ref Range    Cholesterol 192 <200 mg/dL    Triglycerides 84 <150 mg/dL    HDL Cholesterol 55 >39 mg/dL    LDL Cholesterol Calculated 120 (H) <100 mg/dL    Non HDL Cholesterol 137 (H) <130 mg/dL   Hemoglobin A1c   Result Value Ref Range    Hemoglobin A1C 6.8 (H) 4.3 - 6.0 %   Estimated Average Glucose   Result Value Ref Range    Estimated Average Glucose 148 mg/dL               Follow-ups after your visit        Who to contact     If you have questions or need follow up information about today's clinic visit or your schedule please contact [facility] directly at [phone].  Normal or non-critical lab and imaging results will be communicated to you by JavaJobshart, letter or phone within 4 business days after the clinic has received the results. If you do not hear from us within 7 days, please contact the clinic through Adventorist or phone. If you have a critical or abnormal lab result, we will notify you by phone as soon as possible.  Submit refill requests through Yottaa or call your pharmacy and they will forward the refill request to us. Please allow 3 business days for your refill to be completed.          Additional Information About Your Visit        Yottaa Information     Yottaa lets you send messages to your doctor, view your test results, renew your prescriptions, schedule appointments and more. To sign up, go to [email or web address] . Click on \"Log in\" on the left side of the screen, which will take you to the Welcome page. Then click on \"Sign up Now\" on the right side of the page.     You will be asked to enter the access code listed below, as well as some personal information. Please " follow the directions to create your username and password.     Your access code is: [email or web address]  Expires: 10/8/2017  5:10 PM     Your access code will  in 90 days. If you need help or a new code, please call your [facility] clinic or [phone].        Care EveryWhere ID     This is your Care EveryWhere ID. This could be used by other organizations to access your [facility] medical records  [id number]        Your Vitals Were     Pulse Temperature Height BMI (Body Mass Index)          78 97.7  F (36.5  C) 6' (1.829 m) 46.11 kg/m2         Blood Pressure from Last 3 Encounters:   07/10/17 132/82   17 122/80   10/28/16 124/74    Weight from Last 3 Encounters:   07/10/17 (!) 340 lb (154.2 kg)   17 (!) 372 lb (168.7 kg)   10/28/16 (!) 360 lb (163.3 kg)              We Performed the Following     Albumin Random Urine Quantitative     CBC with platelets differential     Comprehensive metabolic panel     Estimated Average Glucose     Hemoglobin A1c     Lipid Profile          Today's Medication Changes          These changes are accurate as of: 7/10/17  5:10 PM.  If you have any questions, ask your nurse or doctor.               Start taking these medicines.        Dose/Directions    atorvastatin 10 MG tablet   Commonly known as:  LIPITOR   Used for:  Pure hypercholesterolemia, Type 2 diabetes mellitus without complication, without long-term current use of insulin (H)   Started by:  Zak Schrader MD        Dose:  10 mg   Take 1 tablet (10 mg) by mouth daily   Quantity:  90 tablet   Refills:  1         These medicines have changed or have updated prescriptions.        Dose/Directions    losartan-hydrochlorothiazide 50-12.5 MG per tablet   Commonly known as:  HYZAAR   This may have changed:  See the new instructions.   Used for:  Essential hypertension   Changed by:  Zak Schrader MD        Dose:  1 tablet   Take 1 tablet by mouth daily   Quantity:  90 tablet   Refills:  1       metFORMIN 500 MG 24 hr  tablet   Commonly known as:  GLUCOPHAGE-XR   This may have changed:  See the new instructions.   Used for:  Type 2 diabetes mellitus without complication, without long-term current use of insulin (H)   Changed by:  Zak Schrader MD        Dose:  1000 mg   Take 2 tablets (1,000 mg) by mouth daily (with dinner)   Quantity:  180 tablet   Refills:  1         Stop taking these medicines if you haven't already. Please contact your care team if you have questions.     simvastatin 20 MG tablet   Commonly known as:  ZOCOR   Stopped by:  Zak Schrader MD                Where to get your medicines      These medications were sent to [facility] - [address]  [address] [facility] [place] [address]     Phone:  [phone]     atorvastatin 10 MG tablet    losartan-hydrochlorothiazide 50-12.5 MG per tablet    metFORMIN 500 MG 24 hr tablet                Primary Care Provider Office Phone # Fax #    Zak Schrader [phone]       [facility] [address]  [place] [address]        Equal Access to Services     [facility]: Hadii aad ku hadasho Soomaali, waaxda luqadaha, qaybta kaalmada adeegyada, waxay idiin haylorenzo doan . So [facility] [phone].    ATENCIÓN: Si habla español, tiene a albert disposición servicios gratuitos de asistencia lingüística. Llame al [phone].    We comply with applicable federal civil rights laws and [place] laws. We do not discriminate on the basis of race, color, national origin, age, disability sex, sexual orientation or gender identity.            Thank you!     Thank you for choosing [facility]  for your care. Our goal is always to provide you with excellent care. Hearing back from our patients is one way we can continue to improve our services. Please take a few minutes to complete the written survey that you may receive in the mail after your visit with us. Thank you!             Your Updated  Medication List - Protect others around you: Learn how to safely use, store and throw away your medicines at www.disposemymeds.org.          This list is accurate as of: 7/10/17  5:10 PM.  Always use your most recent med list.                   Brand Name Dispense Instructions for use Diagnosis    atorvastatin 10 MG tablet    LIPITOR    90 tablet    Take 1 tablet (10 mg) by mouth daily    Pure hypercholesterolemia, Type 2 diabetes mellitus without complication, without long-term current use of insulin (H)       blood glucose monitoring lancets     1 Box    Use to test blood sugar 3 times daily or as directed.    Type 2 diabetes, HbA1c goal < 7% (H)       blood glucose monitoring test strip    DORIAN CONTOUR NEXT    2 Box    Use to test blood sugar 3 times daily or as directed.    Type 2 diabetes, HbA1c goal < 7% (H)       losartan-hydrochlorothiazide 50-12.5 MG per tablet    HYZAAR    90 tablet    Take 1 tablet by mouth daily    Essential hypertension       metFORMIN 500 MG 24 hr tablet    GLUCOPHAGE-XR    180 tablet    Take 2 tablets (1,000 mg) by mouth daily (with dinner)    Type 2 diabetes mellitus without complication, without long-term current use of insulin (H)

## 2017-07-11 ASSESSMENT — ANXIETY QUESTIONNAIRES: GAD7 TOTAL SCORE: 9

## 2017-07-11 ASSESSMENT — PATIENT HEALTH QUESTIONNAIRE - PHQ9: SUM OF ALL RESPONSES TO PHQ QUESTIONS 1-9: 0

## 2018-01-05 DIAGNOSIS — I10 ESSENTIAL HYPERTENSION: ICD-10-CM

## 2018-01-05 DIAGNOSIS — E11.9 TYPE 2 DIABETES MELLITUS WITHOUT COMPLICATION, WITHOUT LONG-TERM CURRENT USE OF INSULIN (H): ICD-10-CM

## 2018-01-05 DIAGNOSIS — E78.00 PURE HYPERCHOLESTEROLEMIA: ICD-10-CM

## 2018-01-08 RX ORDER — ATORVASTATIN CALCIUM 10 MG/1
TABLET, FILM COATED ORAL
Qty: 90 TABLET | Refills: 0 | Status: SHIPPED | OUTPATIENT
Start: 2018-01-08 | End: 2018-01-09

## 2018-01-08 RX ORDER — LOSARTAN POTASSIUM AND HYDROCHLOROTHIAZIDE 12.5; 5 MG/1; MG/1
TABLET ORAL
Qty: 90 TABLET | Refills: 1 | Status: SHIPPED | OUTPATIENT
Start: 2018-01-08 | End: 2018-11-23

## 2018-01-08 NOTE — TELEPHONE ENCOUNTER
hyzaar      Last Written Prescription Date:  7/10/17  Last Fill Quantity: 90,   # refills: 1  Last Office Visit: 12/11/17  Future Office visit:      lipitor      Last Written Prescription Date:  7/10/17  Last Fill Quantity: 90,   # refills: 1  Last Office Visit: 12/11/17  Future Office visit:    Next 5 appointments (look out 90 days)     Jan 09, 2018  4:00 PM CST   (Arrive by 3:45 PM)   SHORT with Zak [facility] MD   [facility]    [address]  [facility] [address]   [phone]                        Next 5 appointments (look out 90 days)     Jan 09, 2018  4:00 PM CST   (Arrive by 3:45 PM)   SHORT with Zak Schrader MD   [facility] ([facility]    [address] [place] [facility] [address]   [phone]

## 2018-01-09 ENCOUNTER — OFFICE VISIT (OUTPATIENT)
Dept: FAMILY MEDICINE | Facility: OTHER | Age: 53
End: 2018-01-09
Attending: FAMILY MEDICINE
Payer: COMMERCIAL

## 2018-01-09 VITALS
SYSTOLIC BLOOD PRESSURE: 136 MMHG | HEART RATE: 83 BPM | RESPIRATION RATE: 16 BRPM | BODY MASS INDEX: 40.43 KG/M2 | OXYGEN SATURATION: 95 % | DIASTOLIC BLOOD PRESSURE: 80 MMHG | HEIGHT: 74 IN | TEMPERATURE: 97.8 F | WEIGHT: 315 LBS

## 2018-01-09 DIAGNOSIS — E78.00 PURE HYPERCHOLESTEROLEMIA: ICD-10-CM

## 2018-01-09 DIAGNOSIS — E11.9 TYPE 2 DIABETES MELLITUS WITHOUT COMPLICATION, WITHOUT LONG-TERM CURRENT USE OF INSULIN (H): ICD-10-CM

## 2018-01-09 DIAGNOSIS — I10 ESSENTIAL HYPERTENSION WITH GOAL BLOOD PRESSURE LESS THAN 140/90: ICD-10-CM

## 2018-01-09 DIAGNOSIS — E66.01 MORBID OBESITY DUE TO EXCESS CALORIES (H): ICD-10-CM

## 2018-01-09 LAB
ALBUMIN SERPL-MCNC: 4 G/DL (ref 3.4–5)
ALP SERPL-CCNC: 83 U/L (ref 40–150)
ALT SERPL W P-5'-P-CCNC: 47 U/L (ref 0–70)
ANION GAP SERPL CALCULATED.3IONS-SCNC: 6 MMOL/L (ref 3–14)
AST SERPL W P-5'-P-CCNC: 19 U/L (ref 0–45)
BASOPHILS # BLD AUTO: 0 10E9/L (ref 0–0.2)
BASOPHILS NFR BLD AUTO: 0.5 %
BILIRUB SERPL-MCNC: 0.4 MG/DL (ref 0.2–1.3)
BUN SERPL-MCNC: 13 MG/DL (ref 7–30)
CALCIUM SERPL-MCNC: 9.3 MG/DL (ref 8.5–10.1)
CHLORIDE SERPL-SCNC: 104 MMOL/L (ref 94–109)
CHOLEST SERPL-MCNC: 214 MG/DL
CO2 SERPL-SCNC: 27 MMOL/L (ref 20–32)
CREAT SERPL-MCNC: 0.83 MG/DL (ref 0.66–1.25)
CREAT UR-MCNC: 88 MG/DL
DIFFERENTIAL METHOD BLD: NORMAL
EOSINOPHIL # BLD AUTO: 0.2 10E9/L (ref 0–0.7)
EOSINOPHIL NFR BLD AUTO: 2.4 %
ERYTHROCYTE [DISTWIDTH] IN BLOOD BY AUTOMATED COUNT: 14.1 % (ref 10–15)
EST. AVERAGE GLUCOSE BLD GHB EST-MCNC: 160 MG/DL
GFR SERPL CREATININE-BSD FRML MDRD: >90 ML/MIN/1.7M2
GLUCOSE SERPL-MCNC: 119 MG/DL (ref 70–99)
HBA1C MFR BLD: 7.2 % (ref 4.3–6)
HCT VFR BLD AUTO: 42.6 % (ref 40–53)
HDLC SERPL-MCNC: 58 MG/DL
HGB BLD-MCNC: 14.5 G/DL (ref 13.3–17.7)
IMM GRANULOCYTES # BLD: 0 10E9/L (ref 0–0.4)
IMM GRANULOCYTES NFR BLD: 0.5 %
LDLC SERPL CALC-MCNC: 143 MG/DL
LYMPHOCYTES # BLD AUTO: 2.5 10E9/L (ref 0.8–5.3)
LYMPHOCYTES NFR BLD AUTO: 29.9 %
MCH RBC QN AUTO: 29.5 PG (ref 26.5–33)
MCHC RBC AUTO-ENTMCNC: 34 G/DL (ref 31.5–36.5)
MCV RBC AUTO: 87 FL (ref 78–100)
MICROALBUMIN UR-MCNC: 6 MG/L
MICROALBUMIN/CREAT UR: 6.4 MG/G CR (ref 0–17)
MONOCYTES # BLD AUTO: 0.6 10E9/L (ref 0–1.3)
MONOCYTES NFR BLD AUTO: 6.7 %
NEUTROPHILS # BLD AUTO: 4.9 10E9/L (ref 1.6–8.3)
NEUTROPHILS NFR BLD AUTO: 60 %
NONHDLC SERPL-MCNC: 156 MG/DL
NRBC # BLD AUTO: 0 10*3/UL
NRBC BLD AUTO-RTO: 0 /100
PLATELET # BLD AUTO: 231 10E9/L (ref 150–450)
POTASSIUM SERPL-SCNC: 4.6 MMOL/L (ref 3.4–5.3)
PROT SERPL-MCNC: 7.9 G/DL (ref 6.8–8.8)
RBC # BLD AUTO: 4.92 10E12/L (ref 4.4–5.9)
SODIUM SERPL-SCNC: 137 MMOL/L (ref 133–144)
TRIGL SERPL-MCNC: 67 MG/DL
WBC # BLD AUTO: 8.2 10E9/L (ref 4–11)

## 2018-01-09 PROCEDURE — 36415 COLL VENOUS BLD VENIPUNCTURE: CPT | Performed by: FAMILY MEDICINE

## 2018-01-09 PROCEDURE — 83036 HEMOGLOBIN GLYCOSYLATED A1C: CPT | Performed by: FAMILY MEDICINE

## 2018-01-09 PROCEDURE — 99214 OFFICE O/P EST MOD 30 MIN: CPT | Performed by: FAMILY MEDICINE

## 2018-01-09 PROCEDURE — 82043 UR ALBUMIN QUANTITATIVE: CPT | Performed by: FAMILY MEDICINE

## 2018-01-09 PROCEDURE — 80053 COMPREHEN METABOLIC PANEL: CPT | Performed by: FAMILY MEDICINE

## 2018-01-09 PROCEDURE — 80061 LIPID PANEL: CPT | Performed by: FAMILY MEDICINE

## 2018-01-09 PROCEDURE — 85025 COMPLETE CBC W/AUTO DIFF WBC: CPT | Performed by: FAMILY MEDICINE

## 2018-01-09 RX ORDER — ATORVASTATIN CALCIUM 10 MG/1
10 TABLET, FILM COATED ORAL DAILY
Qty: 90 TABLET | Refills: 1 | Status: SHIPPED | OUTPATIENT
Start: 2018-01-09 | End: 2019-04-16

## 2018-01-09 ASSESSMENT — ANXIETY QUESTIONNAIRES
1. FEELING NERVOUS, ANXIOUS, OR ON EDGE: NOT AT ALL
2. NOT BEING ABLE TO STOP OR CONTROL WORRYING: NOT AT ALL
5. BEING SO RESTLESS THAT IT IS HARD TO SIT STILL: SEVERAL DAYS
6. BECOMING EASILY ANNOYED OR IRRITABLE: SEVERAL DAYS
4. TROUBLE RELAXING: NOT AT ALL
3. WORRYING TOO MUCH ABOUT DIFFERENT THINGS: NOT AT ALL
7. FEELING AFRAID AS IF SOMETHING AWFUL MIGHT HAPPEN: NOT AT ALL
IF YOU CHECKED OFF ANY PROBLEMS ON THIS QUESTIONNAIRE, HOW DIFFICULT HAVE THESE PROBLEMS MADE IT FOR YOU TO DO YOUR WORK, TAKE CARE OF THINGS AT HOME, OR GET ALONG WITH OTHER PEOPLE: NOT DIFFICULT AT ALL
GAD7 TOTAL SCORE: 2

## 2018-01-09 ASSESSMENT — PAIN SCALES - GENERAL: PAINLEVEL: NO PAIN (0)

## 2018-01-09 ASSESSMENT — PATIENT HEALTH QUESTIONNAIRE - PHQ9: SUM OF ALL RESPONSES TO PHQ QUESTIONS 1-9: 6

## 2018-01-09 NOTE — PROGRESS NOTES
"  SUBJECTIVE:                                                    Glen Rogers is a 52 year old male who presents to clinic today for the following health issues:      Diabetes Follow-up      Patient is checking blood sugars: not at all    Diabetic concerns: None     Symptoms of hypoglycemia (low blood sugar): none     Paresthesias (numbness or burning in feet) or sores: No     Date of last diabetic eye exam: Been past two years and patient knows he needs to make an appointment for that.    Hyperlipidemia Follow-Up      Rate your low fat/cholesterol diet?: patient does low carb.    Taking statin?  Yes, he used to with previous medication and doesn't currently have any muscle issues with the Lipitor.    Other lipid medications/supplements?:  none    Hypertension Follow-up      Outpatient blood pressures are not being checked.    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    Problems taking medications regularly: No    Medication side effects: takes the glucophage at night with dinner or it causes upset stomach    Diet: carbohydrate counting            Problem list and histories reviewed & adjusted, as indicated.  Additional history: as documented    Labs reviewed in EPIC    ROS:  C: NEGATIVE for fever, chills, change in weight  E/M: NEGATIVE for ear, mouth and throat problems  R: NEGATIVE for significant cough or SOB  CV: NEGATIVE for chest pain, palpitations or peripheral edema  ROS otherwise negative    OBJECTIVE:                                                    /80  Pulse 83  Temp 97.8  F (36.6  C)  Resp 16  Ht 6' 1.83\" (1.875 m)  Wt " (!) 340 lb (154.2 kg)  SpO2 95%  BMI 43.86 kg/m2  Body mass index is 43.86 kg/(m^2).   GENERAL: healthy, alert, well nourished, well hydrated, no distress  NECK: no tenderness, no adenopathy, no asymmetry, no masses, no stiffness; thyroid- normal to palpation  RESP: lungs clear to auscultation - no rales, no rhonchi, no wheezes  CV: regular rates and rhythm, normal S1 S2, no S3 or S4 and no murmur, no click or rub -  MS: extremities- no gross deformities noted, no edema    Results for orders placed or performed in visit on 01/09/18 (from the past 24 hour(s))   Comprehensive metabolic panel   Result Value Ref Range    Sodium 137 133 - 144 mmol/L    Potassium 4.6 3.4 - 5.3 mmol/L    Chloride 104 94 - 109 mmol/L    Carbon Dioxide 27 20 - 32 mmol/L    Anion Gap 6 3 - 14 mmol/L    Glucose 119 (H) 70 - 99 mg/dL    Urea Nitrogen 13 7 - 30 mg/dL    Creatinine 0.83 0.66 - 1.25 mg/dL    GFR Estimate >90 >60 mL/min/1.7m2    GFR Estimate If Black >90 >60 mL/min/1.7m2    Calcium 9.3 8.5 - 10.1 mg/dL    Bilirubin Total 0.4 0.2 - 1.3 mg/dL    Albumin 4.0 3.4 - 5.0 g/dL    Protein Total 7.9 6.8 - 8.8 g/dL    Alkaline Phosphatase 83 40 - 150 U/L    ALT 47 0 - 70 U/L    AST 19 0 - 45 U/L   CBC with platelets differential   Result Value Ref Range    WBC 8.2 4.0 - 11.0 10e9/L    RBC Count 4.92 4.4 - 5.9 10e12/L    Hemoglobin 14.5 13.3 - 17.7 g/dL    Hematocrit 42.6 40.0 - 53.0 %    MCV 87 78 - 100 fl    MCH 29.5 26.5 - 33.0 pg    MCHC 34.0 31.5 - 36.5 g/dL    RDW 14.1 10.0 - 15.0 %    Platelet Count 231 150 - 450 10e9/L    Diff Method Automated Method     % Neutrophils 60.0 %    % Lymphocytes 29.9 %    % Monocytes 6.7 %    % Eosinophils 2.4 %    % Basophils 0.5 %    % Immature Granulocytes 0.5 %    Nucleated RBCs 0 0 /100    Absolute Neutrophil 4.9 1.6 - 8.3 10e9/L    Absolute Lymphocytes 2.5 0.8 - 5.3 10e9/L    Absolute Monocytes 0.6 0.0 - 1.3 10e9/L    Absolute Eosinophils 0.2 0.0 - 0.7 10e9/L    Absolute Basophils 0.0 0.0 - 0.2  10e9/L    Abs Immature Granulocytes 0.0 0 - 0.4 10e9/L    Absolute Nucleated RBC 0.0    Lipid Profile   Result Value Ref Range    Cholesterol 214 (H) <200 mg/dL    Triglycerides 67 <150 mg/dL    HDL Cholesterol 58 >39 mg/dL    LDL Cholesterol Calculated 143 (H) <100 mg/dL    Non HDL Cholesterol 156 (H) <130 mg/dL   Hemoglobin A1c   Result Value Ref Range    Hemoglobin A1C 7.2 (H) 4.3 - 6.0 %          ASSESSMENT/PLAN:                                                    1. Type 2 diabetes mellitus without complication, without long-term current use of insulin (H)  *A1C up some - Discussed. No change in meds. Continue current medications and behavioral changes.   F/u in 3-4 months   Eye exam overdue   - Comprehensive metabolic panel  - CBC with platelets differential  - Lipid Profile  - Hemoglobin A1c  - Albumin Random Urine Quantitative with Creat Ratio  - Estimated Average Glucose    2. Pure hypercholesterolemia  On statin - lipid up some   - Comprehensive metabolic panel  - CBC with platelets differential  - Lipid Profile  - Hemoglobin A1c  - Albumin Random Urine Quantitative with Creat Ratio    3. Essential hypertension with goal blood pressure less than 140/90  Stable. Continue current medications and behavioral changes.   - Comprehensive metabolic panel  - CBC with platelets differential  - Lipid Profile  - Hemoglobin A1c  - Albumin Random Urine Quantitative with Creat Ratio    4. Morbid obesity due to excess calories (H)  Discussed - stable but needs to decrease wt more. Discussed behavioral changes to improve obesity including increase diet, decreasing carbs along with other changes in diet and consider seeing dietician or enroll in a watch watcher's type program.   - Comprehensive metabolic panel  - CBC with platelets differential  - Lipid Profile  - Hemoglobin A1c  - Albumin Random Urine Quantitative with Creat Ratio      See Patient Instructions    Zak Schrader MD  [facility]

## 2018-01-09 NOTE — NURSING NOTE
"Chief Complaint   Patient presents with     Diabetes     Lipids     Hypertension       Initial /80  Pulse 83  Temp 97.8  F (36.6  C)  Resp 16  Ht 6' 1.83\" (1.875 m)  Wt (!) 340 lb (154.2 kg)  SpO2 95%  BMI 43.86 kg/m2 Estimated body mass index is 43.86 kg/(m^2) as calculated from the following:    Height as of this encounter: 6' 1.83\" (1.875 m).    Weight as of this encounter: 340 lb (154.2 kg).  Medication Reconciliation: complete   Jennifer Syed      "

## 2018-01-09 NOTE — PATIENT INSTRUCTIONS
Ref Range & Units 4:03 PM  (1/9/18) 6mo ago  (7/10/17) 11mo ago  (1/31/17) 1yr ago  (7/11/16) 1yr ago  (4/6/16) 2yr ago  (7/20/15) 2yr ago  (4/23/15)       Sodium 133 - 144 mmol/L 137 139 138  139 137 137      Potassium 3.4 - 5.3 mmol/L 4.6 4.0 3.6  3.9 4.1 3.9      Chloride 94 - 109 mmol/L 104 105 103  106 106 104      Carbon Dioxide 20 - 32 mmol/L 27 27 26  28 27 28      Anion Gap 3 - 14 mmol/L 6 7 9  5 4 5      Glucose 70 - 99 mg/dL 119 (H) 104 (H) (H)  99 131 (H) 126 (H)      Urea Nitrogen 7 - 30 mg/dL 13 12 15  16 17 16      Creatinine 0.66 - 1.25 mg/dL 0.83 0.87 0.84  0.82 0.87 0.77      GFR Estimate >60 mL/min/1.7m2 >90 >90  Non Afri... >90  Non Afri...  >90  Non Afri... >90  Non Afri... >90  Non Afri...     Comments: Non  GFR Calc      GFR Estimate If Black >60 mL/min/1.7m2 >90 >90   ... >90   ...  >90   ... >90   ... >90   ...     Comments:  GFR Calc      Calcium 8.5 - 10.1 mg/dL 9.3 9.5 9.5  9.5 9.2 9.1      Bilirubin Total 0.2 - 1.3 mg/dL 0.4 0.5 0.3 0.4 0.4 0.3       Albumin 3.4 - 5.0 g/dL 4.0 3.9 3.8 3.6 4.0 3.6       Protein Total 6.8 - 8.8 g/dL 7.9 7.5 7.4 7.2 7.4 6.9       Alkaline Phosphatase 40 - 150 U/L 83 79 74 70 75 69       ALT 0 - 70 U/L 47 39 32 40 44 36       AST 0 - 45 U/L 19 17 14 18 15 13      [facility]  HI HI HI HI HI HI HI          Specimen Collected: 01/09/18  4:03 PM Last Resulted: 01/09/18  4:35 PM                CM=Additional comments                     Lipid Profile   Status:  Final result   Visible to patient:  No (Not Released) Dx:  Pure hypercholesterolemia; Morbid obe... Order: [id number]             Ref Range & Units 4:03 PM  (1/9/18) 6mo ago  (7/10/17) 1yr ago  (7/11/16) 1yr ago  (4/6/16) 2yr ago  (7/20/15) 2yr ago  (3/18/15)      Cholesterol <200 mg/dL 214 (H) 192 163 194 177CM 185CM     Comments: Desirable:       <200 mg/dl      Triglycerides <150 mg/dL 67 84CM 131 84CM 100R, CM 127R, CM       HDL Cholesterol >39 mg/dL 58 55 49 50 48R 45R      LDL Cholesterol Calculated <100 mg/dL 143 (H) 120 (H)CM 88CM 127 (H)CM 109R, CM 115R, CM     Comments: Above desirable:  100-129 mg/dl   Borderline High:  130-159 mg/dL   High:             160-189 mg/dL   Very high:       >189 mg/dl         Non HDL Cholesterol <130 mg/dL 156 (H) 137 (H) 144 (H)CM       Comments: Above Desirable:  130-159 mg/dl   Borderline high:  160-189 mg/dl   High:             190-219 mg/dl   Very high:       >219 mg/dl        Resulting Agency  HI HI HI HI HI HI          Specimen Collected: 01/09/18  4:03 PM Last Resulted: 01/09/18  4:35 PM                CM=Additional comments  R=Reference range differs from displayed range                     Hemoglobin A1c   Status:  Final result   Visible to patient:  No (Not Released) Dx:  Pure hypercholesterolemia; Morbid obe... Order: [id number]             Ref Range & Units 4:03 PM  (1/9/18) 6mo ago  (7/10/17) 11mo ago  (1/31/17) 1yr ago  (10/28/16) 1yr ago  (7/11/16) 1yr ago  (4/6/16) 2yr ago  (11/23/15)      Hemoglobin A1C 4.3 - 6.0 % 7.2 (H) 6.8 (H) 6.9 (H) 6.8 (H) 6.4 (H) 7.0 (H) 6.6 (H)     Resulting Agency  HI HI HI HI HI HI HI          Specimen Collected: 01/09/18  4:03 PM Last Resulted: 01/09/18  4:32 PM                                 CBC with platelets differential   Status:  Final result   Visible to patient:  No (Not Released) Dx:  Pure hypercholesterolemia; Morbid obe... Order: [id number]             Ref Range & Units 4:03 PM   6mo ago   2yr ago        WBC 4.0 - 11.0 10e9/L 8.2 8.3 6.7      RBC Count 4.4 - 5.9 10e12/L 4.92 4.72 4.89      Hemoglobin 13.3 - 17.7 g/dL 14.5 14.1 14.5      Hematocrit 40.0 - 53.0 % 42.6 40.7 42.4      MCV 78 - 100 fl 87 86 87      MCH 26.5 - 33.0 pg 29.5 29.9 29.7      MCHC 31.5 - 36.5 g/dL 34.0 34.6 34.2      RDW 10.0 - 15.0 % 14.1 14.1 14.0      Platelet Count 150 - 450 10e9/L 231 264 261      Diff Method  Automated Method Automated Method Automated Method       % Neutrophils % 60.0 62.9 54.0      % Lymphocytes % 29.9 28.0 32.7      % Monocytes % 6.7 6.6 10.1      % Eosinophils % 2.4 1.9 2.7      % Basophils % 0.5 0.4 0.3      % Immature Granulocytes % 0.5 0.2 0.2      Nucleated RBCs 0 /100 0 0       Absolute Neutrophil 1.6 - 8.3 10e9/L 4.9 5.2 3.6      Absolute Lymphocytes 0.8 - 5.3 10e9/L 2.5 2.3 2.2      Absolute Monocytes 0.0 - 1.3 10e9/L 0.6 0.6 0.7      Absolute Eosinophils 0.0 - 0.7 10e9/L 0.2 0.2 0.2      Absolute Basophils 0.0 - 0.2 10e9/L 0.0 0.0 0.0      Abs Immature Granulocytes 0 - 0.4 10e9/L 0.0 0.0 0.0      Absolute Nucleated RBC  0.0 0.0      Resulting Agency  HI HI HI          Specimen Collected: 01/09/18  4:03 PM Last Resulted: 01/09/18  4:14 PM                                 Status of Other Orders        Order    Lab Status Result Date Provider Status       Albumin Random Urine Quantitative with Creat Ratio In process 1/9/2018 Ordered       Estimated Average Glucose In process 1/9/2018 Ordered

## 2018-01-09 NOTE — MR AVS SNAPSHOT
After Visit Summary   1/9/2018    Glen Rogers    MRN: [id number]           Patient Information     Date Of Birth          1965        Visit Information        Provider Department      1/9/2018 4:00 PM Zak Schrader MD [facility]        Today's Diagnoses     Type 2 diabetes mellitus without complication, without long-term current use of insulin (H)        Pure hypercholesterolemia        Essential hypertension with goal blood pressure less than 140/90        Morbid obesity due to excess calories (H)          Care Instructions       Ref Range & Units 4:03 PM  (1/9/18) 6mo ago  (7/10/17) 11mo ago  (1/31/17) 1yr ago  (7/11/16) 1yr ago  (4/6/16) 2yr ago  (7/20/15) 2yr ago  (4/23/15)       Sodium 133 - 144 mmol/L 137 139 138  139 137 137      Potassium 3.4 - 5.3 mmol/L 4.6 4.0 3.6  3.9 4.1 3.9      Chloride 94 - 109 mmol/L 104 105 103  106 106 104      Carbon Dioxide 20 - 32 mmol/L 27 27 26  28 27 28      Anion Gap 3 - 14 mmol/L 6 7 9  5 4 5      Glucose 70 - 99 mg/dL 119 (H) 104 (H) (H)  99 131 (H) 126 (H)      Urea Nitrogen 7 - 30 mg/dL 13 12 15  16 17 16      Creatinine 0.66 - 1.25 mg/dL 0.83 0.87 0.84  0.82 0.87 0.77      GFR Estimate >60 mL/min/1.7m2 >90 >90  Non Afri... >90  Non Afri...  >90  Non Afri... >90  Non Afri... >90  Non Afri...     Comments: Non  GFR Calc      GFR Estimate If Black >60 mL/min/1.7m2 >90 >90   ... >90   ...  >90   ... >90   ... >90   ...     Comments:  GFR Calc      Calcium 8.5 - 10.1 mg/dL 9.3 9.5 9.5  9.5 9.2 9.1      Bilirubin Total 0.2 - 1.3 mg/dL 0.4 0.5 0.3 0.4 0.4 0.3       Albumin 3.4 - 5.0 g/dL 4.0 3.9 3.8 3.6 4.0 3.6       Protein Total 6.8 - 8.8 g/dL 7.9 7.5 7.4 7.2 7.4 6.9       Alkaline Phosphatase 40 - 150 U/L 83 79 74 70 75 69       ALT 0 - 70 U/L 47 39 32 40 44 36       AST 0 - 45 U/L 19 17 14 18 15 13      Resulting Agency  HI HI HI HI HI HI HI          Specimen  Collected: 01/09/18  4:03 PM Last Resulted: 01/09/18  4:35 PM                CM=Additional comments                     Lipid Profile   Status:  Final result   Visible to patient:  No (Not Released) Dx:  Pure hypercholesterolemia; Morbid obe... Order: [id number]             Ref Range & Units 4:03 PM  (1/9/18) 6mo ago  (7/10/17) 1yr ago  (7/11/16) 1yr ago  (4/6/16) 2yr ago  (7/20/15) 2yr ago  (3/18/15)      Cholesterol <200 mg/dL 214 (H) 192 163 194 177CM 185CM     Comments: Desirable:       <200 mg/dl      Triglycerides <150 mg/dL 67 84CM 131 84CM 100R, CM 127R, CM      HDL Cholesterol >39 mg/dL 58 55 49 50 48R 45R      LDL Cholesterol Calculated <100 mg/dL 143 (H) 120 (H)CM 88CM 127 (H)CM 109R, CM 115R, CM     Comments: Above desirable:  100-129 mg/dl   Borderline High:  130-159 mg/dL   High:             160-189 mg/dL   Very high:       >189 mg/dl         Non HDL Cholesterol <130 mg/dL 156 (H) 137 (H) 144 (H)CM       Comments: Above Desirable:  130-159 mg/dl   Borderline high:  160-189 mg/dl   High:             190-219 mg/dl   Very high:       >219 mg/dl        Resulting Agency  HI HI HI HI HI HI          Specimen Collected: 01/09/18  4:03 PM Last Resulted: 01/09/18  4:35 PM                CM=Additional comments  R=Reference range differs from displayed range                     Hemoglobin A1c   Status:  Final result   Visible to patient:  No (Not Released) Dx:  Pure hypercholesterolemia; Morbid obe... Order: [id number]             Ref Range & Units 4:03 PM  (1/9/18) 6mo ago  (7/10/17) 11mo ago  (1/31/17) 1yr ago  (10/28/16) 1yr ago  (7/11/16) 1yr ago  (4/6/16) 2yr ago  (11/23/15)      Hemoglobin A1C 4.3 - 6.0 % 7.2 (H) 6.8 (H) 6.9 (H) 6.8 (H) 6.4 (H) 7.0 (H) 6.6 (H)     Resulting Agency  HI HI HI HI HI HI HI          Specimen Collected: 01/09/18  4:03 PM Last Resulted: 01/09/18  4:32 PM                                 CBC with platelets differential   Status:  Final result   Visible to patient:  No (Not  Released) Dx:  Pure hypercholesterolemia; Morbid obe... Order: [id number]             Ref Range & Units 4:03 PM   6mo ago   2yr ago        WBC 4.0 - 11.0 10e9/L 8.2 8.3 6.7      RBC Count 4.4 - 5.9 10e12/L 4.92 4.72 4.89      Hemoglobin 13.3 - 17.7 g/dL 14.5 14.1 14.5      Hematocrit 40.0 - 53.0 % 42.6 40.7 42.4      MCV 78 - 100 fl 87 86 87      MCH 26.5 - 33.0 pg 29.5 29.9 29.7      MCHC 31.5 - 36.5 g/dL 34.0 34.6 34.2      RDW 10.0 - 15.0 % 14.1 14.1 14.0      Platelet Count 150 - 450 10e9/L 231 264 261      Diff Method  Automated Method Automated Method Automated Method      % Neutrophils % 60.0 62.9 54.0      % Lymphocytes % 29.9 28.0 32.7      % Monocytes % 6.7 6.6 10.1      % Eosinophils % 2.4 1.9 2.7      % Basophils % 0.5 0.4 0.3      % Immature Granulocytes % 0.5 0.2 0.2      Nucleated RBCs 0 /100 0 0       Absolute Neutrophil 1.6 - 8.3 10e9/L 4.9 5.2 3.6      Absolute Lymphocytes 0.8 - 5.3 10e9/L 2.5 2.3 2.2      Absolute Monocytes 0.0 - 1.3 10e9/L 0.6 0.6 0.7      Absolute Eosinophils 0.0 - 0.7 10e9/L 0.2 0.2 0.2      Absolute Basophils 0.0 - 0.2 10e9/L 0.0 0.0 0.0      Abs Immature Granulocytes 0 - 0.4 10e9/L 0.0 0.0 0.0      Absolute Nucleated RBC  0.0 0.0      Resulting Agency  HI HI HI          Specimen Collected: 01/09/18  4:03 PM Last Resulted: 01/09/18  4:14 PM                                 Status of Other Orders        Order    Lab Status Result Date Provider Status       Albumin Random Urine Quantitative with Creat Ratio In process 1/9/2018 Ordered       Estimated Average Glucose In process 1/9/2018 Ordered                     Follow-ups after your visit        Who to contact     If you have questions or need follow up information about today's clinic visit or your schedule please contact [facility] at [phone].  Normal or non-critical lab and imaging results will be communicated to you by MyChart, letter or phone within 4 business days after the clinic has received  "the results. If you do not hear from us within 7 days, please contact the clinic through [employer] or phone. If you have a critical or abnormal lab result, we will notify you by phone as soon as possible.  Submit refill requests through [employer] or call your pharmacy and they will forward the refill request to us. Please allow 3 business days for your refill to be completed.          Additional Information About Your Visit        [employer] Information     [employer] lets you send messages to your doctor, view your test results, renew your prescriptions, schedule appointments and more. To sign up, go to [email or web address] . Click on \"Log in\" on the left side of the screen, which will take you to the Welcome page. Then click on \"Sign up Now\" on the right side of the page.     You will be asked to enter the access code listed below, as well as some personal information. Please follow the directions to create your username and password.     Your access code is: [email or web address]  Expires: 3/14/2018  7:25 AM     Your access code will  in 90 days. If you need help or a new code, please call your [facility] clinic or [phone].        Care EveryWhere ID     This is your Care EveryWhere ID. This could be used by other organizations to access your [facility] medical records  [id number]        Your Vitals Were     Pulse Temperature Respirations Height Pulse Oximetry BMI (Body Mass Index)    83 97.8  F (36.6  C) 16 6' 1.83\" (1.875 m) 95% 43.86 kg/m2       Blood Pressure from Last 3 Encounters:   18 136/80   07/10/17 132/82   17 122/80    Weight from Last 3 Encounters:   18 (!) 340 lb (154.2 kg)   07/10/17 (!) 340 lb (154.2 kg)   17 (!) 372 lb (168.7 kg)              We Performed the Following     Albumin Random Urine Quantitative with Creat Ratio     CBC with platelets differential     Comprehensive metabolic panel     Estimated Average Glucose     Hemoglobin A1c     Lipid Profile          Today's " Medication Changes          These changes are accurate as of: 1/9/18  4:52 PM.  If you have any questions, ask your nurse or doctor.               These medicines have changed or have updated prescriptions.        Dose/Directions    atorvastatin 10 MG tablet   Commonly known as:  LIPITOR   This may have changed:  See the new instructions.   Used for:  Pure hypercholesterolemia, Type 2 diabetes mellitus without complication, without long-term current use of insulin (H)        Dose:  10 mg   Take 1 tablet (10 mg) by mouth daily   Quantity:  90 tablet   Refills:  1            Where to get your medicines      These medications were sent to [facility] - [place], [place] - [address]  [address] [facility] [address]     Phone:  [phone]     atorvastatin 10 MG tablet                Primary Care Provider Office Phone # Fax #    Zak Schrader [phone]       [facility] [address]  [place] [address]        Equal Access to Services     SHIKHA BALDWIN : Hadii marshall rose hadasho Soomaali, waaxda luqadaha, qaybta kaalmada adeegyada, waxay ezrain haylorenzo doan . So [facility] [phone].    ATENCIÓN: Si habla español, tiene a albert disposición servicios gratuitos de asistencia lingüística. Richy al [phone].    We comply with applicable federal civil rights laws and [place] laws. We do not discriminate on the basis of race, color, national origin, age, disability, sex, sexual orientation, or gender identity.            Thank you!     Thank you for choosing [facility]  for your care. Our goal is always to provide you with excellent care. Hearing back from our patients is one way we can continue to improve our services. Please take a few minutes to complete the written survey that you may receive in the mail after your visit with us. Thank you!             Your Updated Medication List - Protect others around you: Learn how to safely use, store and throw  away your medicines at www.disposemymeds.org.          This list is accurate as of: 1/9/18  4:52 PM.  Always use your most recent med list.                   Brand Name Dispense Instructions for use Diagnosis    atorvastatin 10 MG tablet    LIPITOR    90 tablet    Take 1 tablet (10 mg) by mouth daily    Pure hypercholesterolemia, Type 2 diabetes mellitus without complication, without long-term current use of insulin (H)       blood glucose monitoring lancets     1 Box    Use to test blood sugar 3 times daily or as directed.    Type 2 diabetes, HbA1c goal < 7% (H)       blood glucose monitoring test strip    DORIAN CONTOUR NEXT    2 Box    Use to test blood sugar 3 times daily or as directed.    Type 2 diabetes, HbA1c goal < 7% (H)       losartan-hydrochlorothiazide 50-12.5 MG per tablet    HYZAAR    90 tablet    TAKE 1 TABLET BY MOUTH DAILY    Essential hypertension       metFORMIN 500 MG 24 hr tablet    GLUCOPHAGE-XR    180 tablet    Take 2 tablets (1,000 mg) by mouth daily (with dinner)    Type 2 diabetes mellitus without complication, without long-term current use of insulin (H)

## 2018-01-10 ASSESSMENT — ANXIETY QUESTIONNAIRES: GAD7 TOTAL SCORE: 2

## 2018-02-13 ENCOUNTER — TRANSFERRED RECORDS (OUTPATIENT)
Dept: HEALTH INFORMATION MANAGEMENT | Facility: HOSPITAL | Age: 53
End: 2018-02-13

## 2018-04-17 ENCOUNTER — OFFICE VISIT (OUTPATIENT)
Dept: FAMILY MEDICINE | Facility: OTHER | Age: 53
End: 2018-04-17
Attending: FAMILY MEDICINE
Payer: COMMERCIAL

## 2018-04-17 VITALS
HEIGHT: 73 IN | BODY MASS INDEX: 41.75 KG/M2 | OXYGEN SATURATION: 95 % | SYSTOLIC BLOOD PRESSURE: 120 MMHG | TEMPERATURE: 97.8 F | HEART RATE: 89 BPM | DIASTOLIC BLOOD PRESSURE: 80 MMHG | WEIGHT: 315 LBS

## 2018-04-17 DIAGNOSIS — I10 ESSENTIAL HYPERTENSION WITH GOAL BLOOD PRESSURE LESS THAN 140/90: ICD-10-CM

## 2018-04-17 DIAGNOSIS — E66.01 MORBID OBESITY DUE TO EXCESS CALORIES (H): ICD-10-CM

## 2018-04-17 DIAGNOSIS — E11.9 TYPE 2 DIABETES MELLITUS WITHOUT COMPLICATION, WITHOUT LONG-TERM CURRENT USE OF INSULIN (H): Primary | ICD-10-CM

## 2018-04-17 LAB
ANION GAP SERPL CALCULATED.3IONS-SCNC: 9 MMOL/L (ref 3–14)
BUN SERPL-MCNC: 15 MG/DL (ref 7–30)
CALCIUM SERPL-MCNC: 9.4 MG/DL (ref 8.5–10.1)
CHLORIDE SERPL-SCNC: 105 MMOL/L (ref 94–109)
CO2 SERPL-SCNC: 24 MMOL/L (ref 20–32)
CREAT SERPL-MCNC: 0.81 MG/DL (ref 0.66–1.25)
EST. AVERAGE GLUCOSE BLD GHB EST-MCNC: 157 MG/DL
GFR SERPL CREATININE-BSD FRML MDRD: >90 ML/MIN/1.7M2
GLUCOSE SERPL-MCNC: 104 MG/DL (ref 70–99)
HBA1C MFR BLD: 7.1 % (ref 0–6.4)
POTASSIUM SERPL-SCNC: 4.5 MMOL/L (ref 3.4–5.3)
SODIUM SERPL-SCNC: 138 MMOL/L (ref 133–144)

## 2018-04-17 PROCEDURE — 36415 COLL VENOUS BLD VENIPUNCTURE: CPT | Performed by: FAMILY MEDICINE

## 2018-04-17 PROCEDURE — 83036 HEMOGLOBIN GLYCOSYLATED A1C: CPT | Performed by: FAMILY MEDICINE

## 2018-04-17 PROCEDURE — 99214 OFFICE O/P EST MOD 30 MIN: CPT | Performed by: FAMILY MEDICINE

## 2018-04-17 PROCEDURE — 80048 BASIC METABOLIC PNL TOTAL CA: CPT | Performed by: FAMILY MEDICINE

## 2018-04-17 RX ORDER — METFORMIN HCL 500 MG
1000 TABLET, EXTENDED RELEASE 24 HR ORAL
Qty: 180 TABLET | Refills: 1 | Status: SHIPPED | OUTPATIENT
Start: 2018-04-17 | End: 2019-01-14

## 2018-04-17 ASSESSMENT — ANXIETY QUESTIONNAIRES
GAD7 TOTAL SCORE: 5
4. TROUBLE RELAXING: SEVERAL DAYS
5. BEING SO RESTLESS THAT IT IS HARD TO SIT STILL: NOT AT ALL
1. FEELING NERVOUS, ANXIOUS, OR ON EDGE: NOT AT ALL
6. BECOMING EASILY ANNOYED OR IRRITABLE: MORE THAN HALF THE DAYS
IF YOU CHECKED OFF ANY PROBLEMS ON THIS QUESTIONNAIRE, HOW DIFFICULT HAVE THESE PROBLEMS MADE IT FOR YOU TO DO YOUR WORK, TAKE CARE OF THINGS AT HOME, OR GET ALONG WITH OTHER PEOPLE: NOT DIFFICULT AT ALL
3. WORRYING TOO MUCH ABOUT DIFFERENT THINGS: SEVERAL DAYS
7. FEELING AFRAID AS IF SOMETHING AWFUL MIGHT HAPPEN: SEVERAL DAYS
2. NOT BEING ABLE TO STOP OR CONTROL WORRYING: NOT AT ALL

## 2018-04-17 ASSESSMENT — PAIN SCALES - GENERAL: PAINLEVEL: NO PAIN (1)

## 2018-04-17 NOTE — PROGRESS NOTES
"  SUBJECTIVE:   Glen Rogers is a 52 year old male who presents to clinic today for the following health issues:      Diabetes Follow-up      Patient is checking blood sugars: not at all    Diabetic concerns: None     Symptoms of hypoglycemia (low blood sugar): none     Paresthesias (numbness or burning in feet) or sores: No     Date of last diabetic eye exam: 3/10/2018 Dr. [place]    Hyperlipidemia Follow-Up      Rate your low fat/cholesterol diet?: not monitoring fat    Taking statin?  Yes, no muscle aches from statin    Other lipid medications/supplements?:  none    Hypertension Follow-up      Outpatient blood pressures are not being checked.    Low Salt Diet: not monitoring salt    BP Readings from Last 2 Encounters:   04/17/18 120/80   01/09/18 136/80     Hemoglobin A1C (%)   Date Value   01/09/2018 7.2 (H)   07/10/2017 6.8 (H)     LDL Cholesterol Calculated (mg/dL)   Date Value   01/09/2018 143 (H)   07/10/2017 120 (H)       Amount of exercise or physical activity: Does a lot of walking     At work    Problems taking medications regularly: No    Medication side effects: none    Diet: regular (no restrictions)            Problem list and histories reviewed & adjusted, as indicated.  Additional history: as documented    Labs reviewed in EPIC    Reviewed and updated as needed this visit by clinical staff  Tobacco  Allergies  Meds  Med Hx  Surg Hx  Fam Hx  Soc Hx      Reviewed and updated as needed this visit by Provider         ROS:  CONSTITUTIONAL: NEGATIVE for fever, chills, change in weight  ENT/MOUTH: NEGATIVE for ear, mouth and throat problems  RESP: NEGATIVE for significant cough or SOB  CV: NEGATIVE for chest pain, palpitations or peripheral edema    OBJECTIVE:                                                    /80  Pulse 89  Temp 97.8  F (36.6  C)  Ht 6' 1\" (1.854 m)  Wt (!) 370 lb (167.8 kg)  SpO2 95%  BMI 48.82 kg/m2  Body mass index is 48.82 kg/(m^2).   GENERAL: healthy, alert, well " nourished, well hydrated, no distress  NECK: no tenderness, no adenopathy, no asymmetry, no masses, no stiffness; thyroid- normal to palpation  RESP: lungs clear to auscultation - no rales, no rhonchi, no wheezes  CV: regular rates and rhythm, normal S1 S2, no S3 or S4 and no murmur, no click or rub -  ABDOMEN: soft, no tenderness, no  hepatosplenomegaly, no masses, normal bowel sounds  MS: extremities- no gross deformities noted, plus 1 edema    Results for orders placed or performed in visit on 04/17/18 (from the past 24 hour(s))   Hemoglobin A1c   Result Value Ref Range    Hemoglobin A1C 7.1 (H) 0 - 6.4 %   Basic metabolic panel   Result Value Ref Range    Sodium 138 133 - 144 mmol/L    Potassium 4.5 3.4 - 5.3 mmol/L    Chloride 105 94 - 109 mmol/L    Carbon Dioxide 24 20 - 32 mmol/L    Anion Gap 9 3 - 14 mmol/L    Glucose 104 (H) 70 - 99 mg/dL    Urea Nitrogen 15 7 - 30 mg/dL    Creatinine 0.81 0.66 - 1.25 mg/dL    GFR Estimate >90 >60 mL/min/1.7m2    GFR Estimate If Black >90 >60 mL/min/1.7m2    Calcium 9.4 8.5 - 10.1 mg/dL          ASSESSMENT/PLAN:                                                    1. Type 2 diabetes mellitus without complication, without long-term current use of insulin (H)  Good control - Continue current medications and behavioral changes.   F/u in 4 months.   - Hemoglobin A1c; Future  - Basic metabolic panel; Future  - Hemoglobin A1c  - Basic metabolic panel    2. Essential hypertension with goal blood pressure less than 140/90  Good control . Continue current medications and behavioral changes.   - Hemoglobin A1c; Future  - Basic metabolic panel; Future  - Hemoglobin A1c  - Basic metabolic panel    3. Morbid obesity due to excess calories (H)  Wt way up ?? Correct or not .  Tried 2 scales here and they are the same. Needs to get wt down. Handouts given.  Discussed behavioral changes to improve obesity including increase diet, decreasing carbs along with other changes in diet and consider  seeing dietician or enroll in a watch watcher's type program.  Reassess in 4 months   - Hemoglobin A1c; Future  - Basic metabolic panel; Future  - Hemoglobin A1c  - Basic metabolic panel      See Patient Instructions    Zak Schrader MD  [facility]

## 2018-04-17 NOTE — PATIENT INSTRUCTIONS
Results for orders placed or performed in visit on 04/17/18 (from the past 24 hour(s))   Hemoglobin A1c   Result Value Ref Range    Hemoglobin A1C 7.1 (H) 0 - 6.4 %   Basic metabolic panel   Result Value Ref Range    Sodium 138 133 - 144 mmol/L    Potassium 4.5 3.4 - 5.3 mmol/L    Chloride 105 94 - 109 mmol/L    Carbon Dioxide 24 20 - 32 mmol/L    Anion Gap 9 3 - 14 mmol/L    Glucose 104 (H) 70 - 99 mg/dL    Urea Nitrogen 15 7 - 30 mg/dL    Creatinine 0.81 0.66 - 1.25 mg/dL    GFR Estimate >90 >60 mL/min/1.7m2    GFR Estimate If Black >90 >60 mL/min/1.7m2    Calcium 9.4 8.5 - 10.1 mg/dL       Weight Management: Fact and Fiction    Knowing the truth about losing weight can help you separate what works from what doesn t. Don t be taken in by expensive weight-loss fads like pills, herbs, and special foods that promise unbelievable results. There s no magic way to lose weight. If you have questions about weight loss, ask your healthcare provider.  Fiction:  The faster I lose weight, the better.   Fact: Rapid weight loss is usually due to loss of water or muscle mass. What you re trying to get rid of is extra fat. Aim to lose a 1/2 pound to 2 pounds a week. Then you re more likely to lose fat rather than water or muscle.  Fiction:  Skipping meals will help me lose weight.   Fact: When you skip meals, you don t give your body the energy it needs to work. Hunger makes you more likely to overeat later on. It s best to spread your meals throughout the day. Eat at least three meals a day.  Fiction:  I can t start exercising until I lose weight.   Fact: The sooner you start exercising the better. Exercise helps burn more calories, tone your muscles, and keep your appetite in check. People who continue to exercise after they lose weight are more likely to keep the weight off.  Fiction:  The fewer calories I eat, the better.   Fact: This seems like it should be true, but it s not. When you eat too few calories, your body acts as  if it s on a desert island. It thinks food is scarce, so it slows down your metabolism (how fast you burn calories) to save energy. By eating too few calories, you make it harder to lose weight.  Fiction:  Once I lose weight, I can go back to living the way I did before.   Fact: Going back to your old eating habits and giving up exercise is a sure way to regain any weight you ve lost. The lifestyle changes that help you lose extra weight can also help keep it off. This is why you need to make realistic changes you can stick with.  Fiction:  Low-fat and fat-free mean low-calorie.   Fact: All foods, even fat-free ones, have calories. Eat too many calories and you ll gain weight. It s OK to treat yourself to a fat-free cookie or two. Just don t eat the whole box! A dietitian will help you figure this out, and will likely recommend that you eat three meals a day, with protein with each meal.   Date Last Reviewed: 2/4/2016 2000-2017 [employer]. [address] [place] [address]. All rights reserved. This information is not intended as a substitute for professional medical care. Always follow your healthcare professional's instructions.        Weight Management: Overcoming Your Barriers    You may have many reasons why you re not ready to lose weight. You may not feel you have the time or the skills. You may be afraid of losing weight and gaining it back again. Well, you can lose weight. And you can keep the weight off, if you make changes slowly and stick with them. Remember that you may never find the perfect time to lose weight. Decide that the right time to be healthier is now.  Common barriers  Barrier 1: I don t want to deny myself.  Barrier Buster: You don t have to! Moderation is the key:    Watch portion sizes and know when you're eating more than one serving.    Plan to ask for a doggy bag when you eat out.    Have just one.    Choose lower-fat and lower-calorie versions of your  favorites.    Use a small plate instead of a normal-sized plate.   Barrier 2: I lost weight before but I gained it right back.  Barrier Buster: Make this time different:    List what worked and didn t work last time and what you can try this time.    Choose changes that you are willing to stick with.    Work exercise into your weight-loss plan.    Be realistic about what is possible. Your plan has to fit into your life in a balanced way that works for you.   Barrier 3: I don t have the time to be active.  Barrier Buster: It takes just a few minutes a day!    Be active with a pet or the kids.    Block off activity time in your schedule.    Borrow some time that you usually spend watching TV.    You are too important not to take time to exercise it is your life!   Feel good about yourself  Do you eat more because you feel bad about yourself, then feel even worse as you gain weight? This is a  vicious cycle.  Breaking this cycle is not easy. You may need group support or counseling. Always remember that you are a valuable person, no matter what size or shape you are.  Do you have a health problem? If so, don t use it as an excuse for not losing weight. Ask your healthcare provider or dietitian about methods to lose weight that are safe for you. For example, even if you have severe arthritis, it may be easier for you to exercise in a pool. Get advice from a .    Date Last Reviewed: 2/2/2016 2000-2017 [employer]. [address] [place] [address], [place] [address]. All rights reserved. This information is not intended as a substitute for professional medical care. Always follow your healthcare professional's instructions.        Weight Management: Take It Off and Keep It Off    It s easy to be motivated when you first start. The key is to stay motivated all along the way and to have realistic and achievable goals. There are things you can do to keep yourself on the path to success.  Don t  focus on daily weight gains and losses. Instead, weigh yourself no more than once a week at the same time of day. Weighing yourself each day will probably only frustrate you.    Stay motivated  Here are suggestions to keep you motivated:     Remind yourself of your goals. Post them near the refrigerator or desk where you work.    Make daily entries in your diary or journal about your activity and eating. A visual reminder of success, like a gold star, can help keep you going.    Every week, take time to look back on how much you ve accomplished, and the changes you may have made.    Try taking a nutrition class. It can help you learn new shopping, cooking, and eating skills, and also meet new people. You might try a low-fat cooking or yoga class.    Don t be hard on yourself or give up if you slip. Be patient. Learn from your mistakes and adjust your plan if you need to. Then get right back to it.    Be realistic about your goals. Talk with a dietitian or your healthcare provider about what goals are reasonable for you.   Believe that you can do it  How you think about yourself is just as important as what you do. If you don t think you can succeed, chances are you won t. Believe that you can stick to your plan and meet your goals:    If you don t meet a goal, don t use it as an excuse to give up on your whole plan. Adjust your goal and try again.    Try to understand your own attitude about food.  Are you subject to emotional eating?    Learn how to accept compliments. Even if you get embarrassed, just say  thank you.     Make a list of the things that others like about you and that you like about yourself. Add something new from time to time. Keep this list to look at when you need a lift.  Resources    The President s [place] on Fitness, Sports & Nutritionwww.fitness.gov    Academy of Nutrition and Dieteticswww.eatright.org    Healthfinderwww.healthfinder.gov  Date Last Reviewed: 2/4/2016 2000-2017 The  [employer]. [address] [place] [address], [place] [address]. All rights reserved. This information is not intended as a substitute for professional medical care. Always follow your healthcare professional's instructions.

## 2018-04-17 NOTE — NURSING NOTE
"Chief Complaint   Patient presents with     Diabetes     Hypertension     Lipids       Initial /80  Pulse 89  Temp 97.8  F (36.6  C)  Ht 6' 1\" (1.854 m)  Wt (!) 370 lb (167.8 kg)  SpO2 95%  BMI 48.82 kg/m2 Estimated body mass index is 48.82 kg/(m^2) as calculated from the following:    Height as of this encounter: 6' 1\" (1.854 m).    Weight as of this encounter: 370 lb (167.8 kg).  Medication Reconciliation: complete   Cassandra [facility]   Medical Assistant      "

## 2018-04-17 NOTE — MR AVS SNAPSHOT
After Visit Summary   4/17/2018    Glen Rogers    MRN: [id number]           Patient Information     Date Of Birth          1965        Visit Information        Provider Department      4/17/2018 4:00 PM Zak Schrader MD [facility]        Today's Diagnoses     Type 2 diabetes mellitus without complication, without long-term current use of insulin (H)    -  1    Essential hypertension with goal blood pressure less than 140/90        Morbid obesity due to excess calories (H)          Care Instructions    Results for orders placed or performed in visit on 04/17/18 (from the past 24 hour(s))   Hemoglobin A1c   Result Value Ref Range    Hemoglobin A1C 7.1 (H) 0 - 6.4 %   Basic metabolic panel   Result Value Ref Range    Sodium 138 133 - 144 mmol/L    Potassium 4.5 3.4 - 5.3 mmol/L    Chloride 105 94 - 109 mmol/L    Carbon Dioxide 24 20 - 32 mmol/L    Anion Gap 9 3 - 14 mmol/L    Glucose 104 (H) 70 - 99 mg/dL    Urea Nitrogen 15 7 - 30 mg/dL    Creatinine 0.81 0.66 - 1.25 mg/dL    GFR Estimate >90 >60 mL/min/1.7m2    GFR Estimate If Black >90 >60 mL/min/1.7m2    Calcium 9.4 8.5 - 10.1 mg/dL       Weight Management: Fact and Fiction    Knowing the truth about losing weight can help you separate what works from what doesn t. Don t be taken in by expensive weight-loss fads like pills, herbs, and special foods that promise unbelievable results. There s no magic way to lose weight. If you have questions about weight loss, ask your healthcare provider.  Fiction:  The faster I lose weight, the better.   Fact: Rapid weight loss is usually due to loss of water or muscle mass. What you re trying to get rid of is extra fat. Aim to lose a 1/2 pound to 2 pounds a week. Then you re more likely to lose fat rather than water or muscle.  Fiction:  Skipping meals will help me lose weight.   Fact: When you skip meals, you don t give your body the energy it needs to work. Hunger makes you more likely  to overeat later on. It s best to spread your meals throughout the day. Eat at least three meals a day.  Fiction:  I can t start exercising until I lose weight.   Fact: The sooner you start exercising the better. Exercise helps burn more calories, tone your muscles, and keep your appetite in check. People who continue to exercise after they lose weight are more likely to keep the weight off.  Fiction:  The fewer calories I eat, the better.   Fact: This seems like it should be true, but it s not. When you eat too few calories, your body acts as if it s on a desert island. It thinks food is scarce, so it slows down your metabolism (how fast you burn calories) to save energy. By eating too few calories, you make it harder to lose weight.  Fiction:  Once I lose weight, I can go back to living the way I did before.   Fact: Going back to your old eating habits and giving up exercise is a sure way to regain any weight you ve lost. The lifestyle changes that help you lose extra weight can also help keep it off. This is why you need to make realistic changes you can stick with.  Fiction:  Low-fat and fat-free mean low-calorie.   Fact: All foods, even fat-free ones, have calories. Eat too many calories and you ll gain weight. It s OK to treat yourself to a fat-free cookie or two. Just don t eat the whole box! A dietitian will help you figure this out, and will likely recommend that you eat three meals a day, with protein with each meal.   Date Last Reviewed: 2/4/2016 2000-2017 The [employer]. [address] [place] [address], [place] [address]. All rights reserved. This information is not intended as a substitute for professional medical care. Always follow your healthcare professional's instructions.        Weight Management: Overcoming Your Barriers    You may have many reasons why you re not ready to lose weight. You may not feel you have the time or the skills. You may be afraid of losing weight and gaining it back  again. Well, you can lose weight. And you can keep the weight off, if you make changes slowly and stick with them. Remember that you may never find the perfect time to lose weight. Decide that the right time to be healthier is now.  Common barriers  Barrier 1: I don t want to deny myself.  Barrier Buster: You don t have to! Moderation is the key:    Watch portion sizes and know when you're eating more than one serving.    Plan to ask for a doggy bag when you eat out.    Have just one.    Choose lower-fat and lower-calorie versions of your favorites.    Use a small plate instead of a normal-sized plate.   Barrier 2: I lost weight before but I gained it right back.  Barrier Buster: Make this time different:    List what worked and didn t work last time and what you can try this time.    Choose changes that you are willing to stick with.    Work exercise into your weight-loss plan.    Be realistic about what is possible. Your plan has to fit into your life in a balanced way that works for you.   Barrier 3: I don t have the time to be active.  Barrier Buster: It takes just a few minutes a day!    Be active with a pet or the kids.    Block off activity time in your schedule.    Borrow some time that you usually spend watching TV.    You are too important not to take time to exercise--it is your life!   Feel good about yourself  Do you eat more because you feel bad about yourself, then feel even worse as you gain weight? This is a  vicious cycle.  Breaking this cycle is not easy. You may need group support or counseling. Always remember that you are a valuable person, no matter what size or shape you are.  Do you have a health problem? If so, don t use it as an excuse for not losing weight. Ask your healthcare provider or dietitian about methods to lose weight that are safe for you. For example, even if you have severe arthritis, it may be easier for you to exercise in a pool. Get advice from a .     Date Last Reviewed: 2/2/2016 2000-2017 [employer]. [address] [facility], [place] [address]. All rights reserved. This information is not intended as a substitute for professional medical care. Always follow your healthcare professional's instructions.        Weight Management: Take It Off and Keep It Off    It s easy to be motivated when you first start. The key is to stay motivated all along the way and to have realistic and achievable goals. There are things you can do to keep yourself on the path to success.  Don t focus on daily weight gains and losses. Instead, weigh yourself no more than once a week at the same time of day. Weighing yourself each day will probably only frustrate you.    Stay motivated  Here are suggestions to keep you motivated:     Remind yourself of your goals. Post them near the refrigerator or desk where you work.    Make daily entries in your diary or journal about your activity and eating. A visual reminder of success, like a gold star, can help keep you going.    Every week, take time to look back on how much you ve accomplished, and the changes you may have made.    Try taking a nutrition class. It can help you learn new shopping, cooking, and eating skills, and also meet new people. You might try a low-fat cooking or yoga class.    Don t be hard on yourself or give up if you slip. Be patient. Learn from your mistakes and adjust your plan if you need to. Then get right back to it.    Be realistic about your goals. Talk with a dietitian or your healthcare provider about what goals are reasonable for you.   Believe that you can do it  How you think about yourself is just as important as what you do. If you don t think you can succeed, chances are you won t. Believe that you can stick to your plan and meet your goals:    If you don t meet a goal, don t use it as an excuse to give up on your whole plan. Adjust your goal and try again.    Try to understand your own  "attitude about food.  Are you subject to emotional eating?    Learn how to accept compliments. Even if you get embarrassed, just say  thank you.     Make a list of the things that others like about you and that you like about yourself. Add something new from time to time. Keep this list to look at when you need a lift.  Resources    The President s [place] on Fitness, Sports & Nutritionwww.fitness.gov    Academy of Nutrition and Dieteticswww.eatright.org    Healthfinderwww.healthfinder.gov  Date Last Reviewed: 2/4/2016 2000-2017 [employer]. [address] [place] [address]. All rights reserved. This information is not intended as a substitute for professional medical care. Always follow your healthcare professional's instructions.                Follow-ups after your visit        Who to contact     If you have questions or need follow up information about today's clinic visit or your schedule please contact [facility] directly at [phone].  Normal or non-critical lab and imaging results will be communicated to you by MyChart, letter or phone within 4 business days after the clinic has received the results. If you do not hear from us within 7 days, please contact the clinic through [employer] or phone. If you have a critical or abnormal lab result, we will notify you by phone as soon as possible.  Submit refill requests through "[employer] or call your pharmacy and they will forward the refill request to us. Please allow 3 business days for your refill to be completed.          Additional Information About Your Visit        "[employer] Information     "[employer] lets you send messages to your doctor, view your test results, renew your prescriptions, schedule appointments and more. To sign up, go to [email or web address] . Click on \"Log in\" on the left side of the screen, which will take you to the Welcome page. Then click on \"Sign up Now\" on the right side of the page.     You will " "be asked to enter the access code listed below, as well as some personal information. Please follow the directions to create your username and password.     Your access code is: [email or web address]  Expires: 2018  4:13 PM     Your access code will  in 90 days. If you need help or a new code, please call your [place] clinic or [phone].        Care EveryWhere ID     This is your Care EveryWhere ID. This could be used by other organizations to access your [place] medical records  [id number]        Your Vitals Were     Pulse Temperature Height Pulse Oximetry BMI (Body Mass Index)       89 97.8  F (36.6  C) 6' 1\" (1.854 m) 95% 48.82 kg/m2        Blood Pressure from Last 3 Encounters:   18 120/80   18 136/80   07/10/17 132/82    Weight from Last 3 Encounters:   18 (!) 370 lb (167.8 kg)   18 (!) 340 lb (154.2 kg)   07/10/17 (!) 340 lb (154.2 kg)              We Performed the Following     Basic metabolic panel     Estimated Average Glucose     Hemoglobin A1c          Where to get your medicines      These medications were sent to [facility] - [place], [place] - [address]  [address] [facility] [address]     Phone:  [phone]     metFORMIN 500 MG 24 hr tablet          Primary Care Provider Office Phone # Fax #    Zak Schrader [phone]       [address] [address]        Equal Access to Services     [facility]: Hadii marshall bakero Sostar, waaxda luqadaha, qaybta kaalmada chencho tran . So [facility] [phone].    ATENCIÓN: Si habla español, tiene a albert disposición servicios gratuitos de asistencia lingüística. Llame al [phone].    We comply with applicable federal civil rights laws and [place] laws. We do not discriminate on the basis of race, color, national origin, age, disability, sex, sexual orientation, or gender identity.            Thank you!     [facility] you for [facility]  [facility]. Our goal is always to provide you with excellent care. Hearing back from our patients is one way we can continue to improve our services. Please take a few minutes to complete the written survey that you may receive in the mail after your visit with us. Thank you!             Your Updated Medication List - Protect others around you: Learn how to safely use, store and throw away your medicines at www.disposemymeds.org.          This list is accurate as of 4/17/18  4:13 PM.  Always use your most recent med list.                   Brand Name Dispense Instructions for use Diagnosis    atorvastatin 10 MG tablet    LIPITOR    90 tablet    Take 1 tablet (10 mg) by mouth daily    Pure hypercholesterolemia, Type 2 diabetes mellitus without complication, without long-term current use of insulin (H)       blood glucose monitoring lancets     1 Box    Use to test blood sugar 3 times daily or as directed.    Type 2 diabetes, HbA1c goal < 7% (H)       blood glucose monitoring test strip    DORIAN CONTOUR NEXT    2 Box    Use to test blood sugar 3 times daily or as directed.    Type 2 diabetes, HbA1c goal < 7% (H)       losartan-hydrochlorothiazide 50-12.5 MG per tablet    HYZAAR    90 tablet    TAKE 1 TABLET BY MOUTH DAILY    Essential hypertension       metFORMIN 500 MG 24 hr tablet    GLUCOPHAGE-XR    180 tablet    Take 2 tablets (1,000 mg) by mouth daily (with dinner)    Type 2 diabetes mellitus without complication, without long-term current use of insulin (H)

## 2018-04-18 ASSESSMENT — PATIENT HEALTH QUESTIONNAIRE - PHQ9: SUM OF ALL RESPONSES TO PHQ QUESTIONS 1-9: 6

## 2018-04-18 ASSESSMENT — ANXIETY QUESTIONNAIRES: GAD7 TOTAL SCORE: 5

## 2018-08-20 ENCOUNTER — OFFICE VISIT (OUTPATIENT)
Dept: FAMILY MEDICINE | Facility: OTHER | Age: 53
End: 2018-08-20
Attending: FAMILY MEDICINE
Payer: COMMERCIAL

## 2018-08-20 VITALS
BODY MASS INDEX: 41.75 KG/M2 | OXYGEN SATURATION: 95 % | HEART RATE: 80 BPM | DIASTOLIC BLOOD PRESSURE: 74 MMHG | SYSTOLIC BLOOD PRESSURE: 128 MMHG | TEMPERATURE: 98.4 F | WEIGHT: 315 LBS | HEIGHT: 73 IN

## 2018-08-20 DIAGNOSIS — E66.01 MORBID OBESITY DUE TO EXCESS CALORIES (H): ICD-10-CM

## 2018-08-20 DIAGNOSIS — E11.9 TYPE 2 DIABETES MELLITUS WITHOUT COMPLICATION, WITHOUT LONG-TERM CURRENT USE OF INSULIN (H): Primary | ICD-10-CM

## 2018-08-20 DIAGNOSIS — E78.00 PURE HYPERCHOLESTEROLEMIA: ICD-10-CM

## 2018-08-20 DIAGNOSIS — I10 ESSENTIAL HYPERTENSION WITH GOAL BLOOD PRESSURE LESS THAN 140/90: ICD-10-CM

## 2018-08-20 LAB
ANION GAP SERPL CALCULATED.3IONS-SCNC: 5 MMOL/L (ref 3–14)
BUN SERPL-MCNC: 12 MG/DL (ref 7–30)
CALCIUM SERPL-MCNC: 8.8 MG/DL (ref 8.5–10.1)
CHLORIDE SERPL-SCNC: 105 MMOL/L (ref 94–109)
CO2 SERPL-SCNC: 29 MMOL/L (ref 20–32)
CREAT SERPL-MCNC: 0.85 MG/DL (ref 0.66–1.25)
EST. AVERAGE GLUCOSE BLD GHB EST-MCNC: 163 MG/DL
GFR SERPL CREATININE-BSD FRML MDRD: >90 ML/MIN/1.7M2
GLUCOSE SERPL-MCNC: 110 MG/DL (ref 70–99)
HBA1C MFR BLD: 7.3 % (ref 0–5.6)
POTASSIUM SERPL-SCNC: 4.5 MMOL/L (ref 3.4–5.3)
SODIUM SERPL-SCNC: 139 MMOL/L (ref 133–144)

## 2018-08-20 PROCEDURE — 83036 HEMOGLOBIN GLYCOSYLATED A1C: CPT | Performed by: FAMILY MEDICINE

## 2018-08-20 PROCEDURE — 80048 BASIC METABOLIC PNL TOTAL CA: CPT | Performed by: FAMILY MEDICINE

## 2018-08-20 PROCEDURE — 99214 OFFICE O/P EST MOD 30 MIN: CPT | Performed by: FAMILY MEDICINE

## 2018-08-20 PROCEDURE — 36415 COLL VENOUS BLD VENIPUNCTURE: CPT | Performed by: FAMILY MEDICINE

## 2018-08-20 ASSESSMENT — ANXIETY QUESTIONNAIRES
4. TROUBLE RELAXING: SEVERAL DAYS
5. BEING SO RESTLESS THAT IT IS HARD TO SIT STILL: NOT AT ALL
1. FEELING NERVOUS, ANXIOUS, OR ON EDGE: NOT AT ALL
GAD7 TOTAL SCORE: 4
2. NOT BEING ABLE TO STOP OR CONTROL WORRYING: NOT AT ALL
3. WORRYING TOO MUCH ABOUT DIFFERENT THINGS: SEVERAL DAYS
7. FEELING AFRAID AS IF SOMETHING AWFUL MIGHT HAPPEN: SEVERAL DAYS
6. BECOMING EASILY ANNOYED OR IRRITABLE: SEVERAL DAYS

## 2018-08-20 ASSESSMENT — PAIN SCALES - GENERAL: PAINLEVEL: NO PAIN (0)

## 2018-08-20 NOTE — PROGRESS NOTES
SUBJECTIVE:   Glen Rogers is a 52 year old male who presents to clinic today for the following health issues:      Diabetes Follow-up      Patient is checking blood sugars: not at all    Diabetic concerns: None     Symptoms of hypoglycemia (low blood sugar): none     Paresthesias (numbness or burning in feet) or sores: No     Date of last diabetic eye exam: Feb 1. foot deformity   No  2. Current or previous foot ulceration     No  3. Current or previous pre-ulcerative calluses     No  4. previous partial amputation of one or both feet or complete amputation of one foot     No  5. peripheral neuropathy with evidence of callus formation     No  6. poor circulation     No      BP Readings from Last 2 Encounters:   04/17/18 120/80   01/09/18 136/80     Hemoglobin A1C (%)   Date Value   04/17/2018 7.1 (H)   01/09/2018 7.2 (H)     LDL Cholesterol Calculated (mg/dL)   Date Value   01/09/2018 143 (H)   07/10/2017 120 (H)       Diabetes Management Resources    Amount of exercise or physical activity: None    Problems taking medications regularly: No    Medication side effects: none    Diet: regular (no restrictions)        Hyperlipidemia Follow-Up      Rate your low fat/cholesterol diet?: good    Taking statin?  Yes, no muscle aches from statin    Other lipid medications/supplements?:  none    Hypertension Follow-up      Outpatient blood pressures are not being checked.    Low Salt Diet: no added salt      Problem list and histories reviewed & adjusted, as indicated.  Additional history: as documented    Labs reviewed in EPIC    Reviewed and updated as needed this visit by clinical staff       Reviewed and updated as needed this visit by Provider         ROS:  CONSTITUTIONAL: NEGATIVE for fever, chills, change in weight  ENT/MOUTH: NEGATIVE for ear, mouth and throat problems  RESP: NEGATIVE for significant cough or SOB  CV: NEGATIVE for chest pain, palpitations or peripheral edema  ROS otherwise  "negative    OBJECTIVE:                                                    /74  Pulse 80  Temp 98.4  F (36.9  C)  Ht 6' 1\" (1.854 m)  Wt (!) 365 lb (165.6 kg)  SpO2 95%  BMI 48.16 kg/m2  Body mass index is 48.16 kg/(m^2).   GENERAL: healthy, alert, well nourished, well hydrated, no distress  NECK: no tenderness, no adenopathy, no asymmetry, no masses, no stiffness; thyroid- normal to palpation  RESP: lungs clear to auscultation - no rales, no rhonchi, no wheezes  CV: regular rates and rhythm, normal S1 S2, no S3 or S4 and no murmur, no click or rub -  ABDOMEN: soft, no tenderness, no  hepatosplenomegaly, no masses, normal bowel sounds  Diabetic foot exam: normal DP and PT pulses, no trophic changes or ulcerative lesions, normal sensory exam and normal monofilament exam    Results for orders placed or performed in visit on 08/20/18 (from the past 24 hour(s))   Basic metabolic panel   Result Value Ref Range    Sodium 139 133 - 144 mmol/L    Potassium 4.5 3.4 - 5.3 mmol/L    Chloride 105 94 - 109 mmol/L    Carbon Dioxide 29 20 - 32 mmol/L    Anion Gap 5 3 - 14 mmol/L    Glucose 110 (H) 70 - 99 mg/dL    Urea Nitrogen 12 7 - 30 mg/dL    Creatinine 0.85 0.66 - 1.25 mg/dL    GFR Estimate >90 >60 mL/min/1.7m2    GFR Estimate If Black >90 >60 mL/min/1.7m2    Calcium 8.8 8.5 - 10.1 mg/dL   Hemoglobin A1c   Result Value Ref Range    Hemoglobin A1C 7.3 (H) 0 - 5.6 %          ASSESSMENT/PLAN:                                                    1. Type 2 diabetes mellitus without complication, without long-term current use of insulin (H)  Good control. Continue current medications and behavioral changes. F/u in 4 months   - Basic metabolic panel; Future  - Hemoglobin A1c; Future  - Basic metabolic panel  - Hemoglobin A1c  - Estimated Average Glucose    2. Pure hypercholesterolemia  On statin. - Continue current medications and behavioral changes.   - Basic metabolic panel; Future  - Hemoglobin A1c; Future  - Basic " metabolic panel  - Hemoglobin A1c    3. Essential hypertension with goal blood pressure less than 140/90  Stable . Continue current medications and behavioral changes.   - Basic metabolic panel; Future  - Hemoglobin A1c; Future  - Basic metabolic panel  - Hemoglobin A1c    4. Morbid obesity due to excess calories (H)  Wt down little.   - Basic metabolic panel; Future  - Hemoglobin A1c; Future  - Basic metabolic panel  - Hemoglobin A1c      See Patient Instructions    Zak Schrader MD  [facility]

## 2018-08-20 NOTE — MR AVS SNAPSHOT
After Visit Summary   8/20/2018    Glen Rogers    MRN: [id number]           Patient Information     Date Of Birth          1965        Visit Information        Provider Department      8/20/2018 3:30 PM Zak Schrader MD [facility] [place]        Today's Diagnoses     Type 2 diabetes mellitus without complication, without long-term current use of insulin (H)    -  1    Pure hypercholesterolemia        Essential hypertension with goal blood pressure less than 140/90        Morbid obesity due to excess calories (H)          Care Instructions    Results for orders placed or performed in visit on 08/20/18 (from the past 24 hour(s))   Basic metabolic panel   Result Value Ref Range    Sodium 139 133 - 144 mmol/L    Potassium 4.5 3.4 - 5.3 mmol/L    Chloride 105 94 - 109 mmol/L    Carbon Dioxide 29 20 - 32 mmol/L    Anion Gap 5 3 - 14 mmol/L    Glucose 110 (H) 70 - 99 mg/dL    Urea Nitrogen 12 7 - 30 mg/dL    Creatinine 0.85 0.66 - 1.25 mg/dL    GFR Estimate >90 >60 mL/min/1.7m2    GFR Estimate If Black >90 >60 mL/min/1.7m2    Calcium 8.8 8.5 - 10.1 mg/dL   Hemoglobin A1c   Result Value Ref Range    Hemoglobin A1C 7.3 (H) 0 - 5.6 %               Follow-ups after your visit        Your next 10 appointments already scheduled     Jan 14, 2019  3:30 PM CST   (Arrive by 3:15 PM)   SHORT with Zak Scharder MD   [facility] [place] ([facility] )    [address]  [place] [address]   [phone]              Who to contact     If you have questions or need follow up information about today's clinic visit or your schedule please contact [facility] directly at [phone].  Normal or non-critical lab and imaging results will be communicated to you by MyChart, letter or phone within 4 business days after the clinic has received the results. If you do not hear from us within 7 days, please contact the clinic through MyChart or phone. If you have a  "critical or abnormal lab result, we will notify you by phone as soon as possible.  Submit refill requests through [employer] or call your pharmacy and they will forward the refill request to us. Please allow 3 business days for your refill to be completed.          Additional Information About Your Visit        MyChart Information     [employer] lets you send messages to your doctor, view your test results, renew your prescriptions, schedule appointments and more. To sign up, go to [email or web address] . Click on \"Log in\" on the left side of the screen, which will take you to the Welcome page. Then click on \"Sign up Now\" on the right side of the page.     You will be asked to enter the access code listed below, as well as some personal information. Please follow the directions to create your username and password.     Your access code is: [email or web address]  Expires: 10/25/2018  8:40 AM     Your access code will  in 90 days. If you need help or a new code, please call your [place] clinic or [phone].        Care EveryWhere ID     This is your Care EveryWhere ID. This could be used by other organizations to access your [place] medical records  [id number]        Your Vitals Were     Pulse Temperature Height Pulse Oximetry BMI (Body Mass Index)       80 98.4  F (36.9  C) 6' 1\" (1.854 m) 95% 48.16 kg/m2        Blood Pressure from Last 3 Encounters:   18 128/74   18 120/80   18 136/80    Weight from Last 3 Encounters:   18 (!) 365 lb (165.6 kg)   18 (!) 370 lb (167.8 kg)   18 (!) 340 lb (154.2 kg)              We Performed the Following     Basic metabolic panel     Estimated Average Glucose     Hemoglobin A1c        Primary Care Provider Office Phone # Fax #    Zak Schrader [phone] [address] [address]        Equal Access to Services     SHIKHA BALDWIN AH: amrit Mtz, chencho guerra " sanjay hutsonrajan sosaaan ah. So [facility] [phone].    ATENCIÓN: Si chava woodard, tiene a albert disposición servicios gratuitos de asistencia lingüística. Richy al [phone].    We comply with applicable federal civil rights laws and [place] laws. We do not discriminate on the basis of race, color, national origin, age, disability, sex, sexual orientation, or gender identity.            Thank you!     Thank you for [facility]  [facility] your [facility]. Our goal is always to provide you with excellent care. Hearing back from our patients is one way we can continue to improve our services. Please take a few minutes to complete the written survey that you may receive in the mail after your visit with us. Thank you!             Your Updated Medication List - Protect others around you: Learn how to safely use, store and throw away your medicines at www.disposemymeds.org.          This list is accurate as of 8/20/18  4:10 PM.  Always use your most recent med list.                   Brand Name Dispense Instructions for use Diagnosis    atorvastatin 10 MG tablet    LIPITOR    90 tablet    Take 1 tablet (10 mg) by mouth daily    Pure hypercholesterolemia, Type 2 diabetes mellitus without complication, without long-term current use of insulin (H)       blood glucose monitoring lancets     1 Box    Use to test blood sugar 3 times daily or as directed.    Type 2 diabetes, HbA1c goal < 7% (H)       blood glucose monitoring test strip    DORIAN CONTOUR NEXT    2 Box    Use to test blood sugar 3 times daily or as directed.    Type 2 diabetes, HbA1c goal < 7% (H)       losartan-hydrochlorothiazide 50-12.5 MG per tablet    HYZAAR    90 tablet    TAKE 1 TABLET BY MOUTH DAILY    Essential hypertension       metFORMIN 500 MG 24 hr tablet    GLUCOPHAGE-XR    180 tablet    Take 2 tablets (1,000 mg) by mouth daily (with dinner)    Type 2 diabetes mellitus without complication, without long-term current use of insulin  (H)

## 2018-08-20 NOTE — NURSING NOTE
"Chief Complaint   Patient presents with     Diabetes       Initial /74  Pulse 80  Temp 98.4  F (36.9  C)  Ht 6' 1\" (1.854 m)  Wt (!) 365 lb (165.6 kg)  SpO2 95%  BMI 48.16 kg/m2 Estimated body mass index is 48.16 kg/(m^2) as calculated from the following:    Height as of this encounter: 6' 1\" (1.854 m).    Weight as of this encounter: 365 lb (165.6 kg).  Medication Reconciliation: complete    Steven Deras LPN  "

## 2018-08-20 NOTE — PATIENT INSTRUCTIONS
Results for orders placed or performed in visit on 08/20/18 (from the past 24 hour(s))   Basic metabolic panel   Result Value Ref Range    Sodium 139 133 - 144 mmol/L    Potassium 4.5 3.4 - 5.3 mmol/L    Chloride 105 94 - 109 mmol/L    Carbon Dioxide 29 20 - 32 mmol/L    Anion Gap 5 3 - 14 mmol/L    Glucose 110 (H) 70 - 99 mg/dL    Urea Nitrogen 12 7 - 30 mg/dL    Creatinine 0.85 0.66 - 1.25 mg/dL    GFR Estimate >90 >60 mL/min/1.7m2    GFR Estimate If Black >90 >60 mL/min/1.7m2    Calcium 8.8 8.5 - 10.1 mg/dL   Hemoglobin A1c   Result Value Ref Range    Hemoglobin A1C 7.3 (H) 0 - 5.6 %

## 2018-08-21 ASSESSMENT — PATIENT HEALTH QUESTIONNAIRE - PHQ9: SUM OF ALL RESPONSES TO PHQ QUESTIONS 1-9: 5

## 2018-08-21 ASSESSMENT — ANXIETY QUESTIONNAIRES: GAD7 TOTAL SCORE: 4

## 2018-11-23 DIAGNOSIS — I10 ESSENTIAL HYPERTENSION: ICD-10-CM

## 2018-11-27 RX ORDER — LOSARTAN POTASSIUM AND HYDROCHLOROTHIAZIDE 12.5; 5 MG/1; MG/1
TABLET ORAL
Qty: 90 TABLET | Refills: 1 | Status: SHIPPED | OUTPATIENT
Start: 2018-11-27 | End: 2019-01-14

## 2018-11-27 NOTE — TELEPHONE ENCOUNTER
losartan-hydrochlorothiazide (HYZAAR) 50-12.5 MG per tablet     Last Written Prescription Date:  1/8/18  Last Fill Quantity: 90,   # refills: 1  Last Office Visit: 8/20/18  Future Office visit:    Next 5 appointments (look out 90 days)     Jan 14, 2019  3:30 PM CST   (Arrive by 3:15 PM)   SHORT with Zak Schrader MD   [facility] [place] [facility] ([facility] [place] )    [address] [address]   [phone]

## 2019-01-07 NOTE — PROGRESS NOTES
"  SUBJECTIVE:   Glen Rogers is a 53 year old male who presents to clinic today for the following health issues:      Diabetes Follow-up      Patient is checking blood sugars: not at all    Diabetic concerns: None     Symptoms of hypoglycemia (low blood sugar): none     Paresthesias (numbness or burning in feet) or sores: No     Date of last diabetic eye exam: Feb    Diabetes Management Resources    Hyperlipidemia Follow-Up      Rate your low fat/cholesterol diet?: not monitoring fat    Taking statin?  Yes, no muscle aches from statin    Other lipid medications/supplements?:  none    Hypertension Follow-up      Outpatient blood pressures are not being checked.    Low Salt Diet: no added salt    BP Readings from Last 2 Encounters:   08/20/18 128/74   04/17/18 120/80     Hemoglobin A1C (%)   Date Value   08/20/2018 7.3 (H)   04/17/2018 7.1 (H)     LDL Cholesterol Calculated (mg/dL)   Date Value   01/09/2018 143 (H)   07/10/2017 120 (H)       Amount of exercise or physical activity: None    Problems taking medications regularly: No    Medication side effects: none    Diet: carbohydrate counting            Problem list and histories reviewed & adjusted, as indicated.  Additional history: as documented    Labs reviewed in EPIC    Reviewed and updated as needed this visit by clinical staff       Reviewed and updated as needed this visit by Provider         ROS:  CONSTITUTIONAL: NEGATIVE for fever, chills, change in weight  ENT/MOUTH: NEGATIVE for ear, mouth and throat problems  RESP: NEGATIVE for significant cough or SOB  CV: NEGATIVE for chest pain, palpitations or peripheral edema  ROS otherwise negative    OBJECTIVE:                                                    /72   Pulse 73   Temp 97.3  F (36.3  C)   Ht 1.854 m (6' 1\")   Wt (!) 157.9 kg (348 lb)   SpO2 97%   BMI 45.91 kg/m    Body mass index is 45.91 kg/m .   GENERAL: healthy, alert, well nourished, well hydrated, no distress  NECK: no " tenderness, no adenopathy, no asymmetry, no masses, no stiffness; thyroid- normal to palpation  RESP: lungs clear to auscultation - no rales, no rhonchi, no wheezes  CV: regular rates and rhythm, normal S1 S2, no S3 or S4 and no murmur, no click or rub -  ABDOMEN: soft, no tenderness, no  hepatosplenomegaly, no masses, normal bowel sounds  MS: extremities- no gross deformities noted, no edema    Results for orders placed or performed in visit on 01/14/19   TSH   Result Value Ref Range    TSH 0.76 0.40 - 4.00 mU/L   Hemoglobin A1c   Result Value Ref Range    Hemoglobin A1C 6.7 (H) 0 - 5.6 %   Comprehensive metabolic panel   Result Value Ref Range    Sodium 139 133 - 144 mmol/L    Potassium 4.0 3.4 - 5.3 mmol/L    Chloride 106 94 - 109 mmol/L    Carbon Dioxide 27 20 - 32 mmol/L    Anion Gap 6 3 - 14 mmol/L    Glucose 108 (H) 70 - 99 mg/dL    Urea Nitrogen 16 7 - 30 mg/dL    Creatinine 0.76 0.66 - 1.25 mg/dL    GFR Estimate >90 >60 mL/min/[1.73_m2]    GFR Estimate If Black >90 >60 mL/min/[1.73_m2]    Calcium 8.8 8.5 - 10.1 mg/dL    Bilirubin Total 0.3 0.2 - 1.3 mg/dL    Albumin 3.8 3.4 - 5.0 g/dL    Protein Total 7.1 6.8 - 8.8 g/dL    Alkaline Phosphatase 77 40 - 150 U/L    ALT 42 0 - 70 U/L    AST 17 0 - 45 U/L   Lipid Profile (Chol, Trig, HDL, LDL calc)   Result Value Ref Range    Cholesterol 170 <200 mg/dL    Triglycerides 69 <150 mg/dL    HDL Cholesterol 55 >39 mg/dL    LDL Cholesterol Calculated 101 (H) <100 mg/dL    Non HDL Cholesterol 115 <130 mg/dL   Estimated Average Glucose   Result Value Ref Range    Estimated Average Glucose 146 mg/dL          ASSESSMENT/PLAN:                                                    1. Essential hypertension with goal blood pressure less than 140/90  Good control - Continue current medications and behavioral changes.   - Comprehensive metabolic panel; Future  - TSH; Future  - TSH  - Comprehensive metabolic panel  - Estimated Average Glucose  - Estimated Average Glucose    2. Pure  hypercholesterolemia  Good control on statin. Continue current medications and behavioral changes.   - Lipid Profile (Chol, Trig, HDL, LDL calc); Future  - Comprehensive metabolic panel; Future  - TSH; Future  - TSH  - Comprehensive metabolic panel  - Lipid Profile (Chol, Trig, HDL, LDL calc)    3. Type 2 diabetes mellitus without complication, without long-term current use of insulin (H)  Great control--  Continue current medications and behavioral changes.   F/u in 5-6 months.   - Comprehensive metabolic panel; Future  - Hemoglobin A1c; Future  - TSH; Future  - TSH  - Hemoglobin A1c  - Comprehensive metabolic panel      Prostate screen - overdue- PSA done.     See Patient Instructions    Zak Schrader MD  [facility] [place]

## 2019-01-14 ENCOUNTER — OFFICE VISIT (OUTPATIENT)
Dept: FAMILY MEDICINE | Facility: OTHER | Age: 54
End: 2019-01-14
Attending: FAMILY MEDICINE
Payer: COMMERCIAL

## 2019-01-14 VITALS
SYSTOLIC BLOOD PRESSURE: 124 MMHG | TEMPERATURE: 97.3 F | HEIGHT: 73 IN | OXYGEN SATURATION: 97 % | BODY MASS INDEX: 41.75 KG/M2 | WEIGHT: 315 LBS | DIASTOLIC BLOOD PRESSURE: 72 MMHG | HEART RATE: 73 BPM

## 2019-01-14 DIAGNOSIS — I10 ESSENTIAL HYPERTENSION: ICD-10-CM

## 2019-01-14 DIAGNOSIS — E11.9 TYPE 2 DIABETES MELLITUS WITHOUT COMPLICATION, WITHOUT LONG-TERM CURRENT USE OF INSULIN (H): ICD-10-CM

## 2019-01-14 DIAGNOSIS — E78.00 PURE HYPERCHOLESTEROLEMIA: ICD-10-CM

## 2019-01-14 DIAGNOSIS — Z12.5 SCREENING FOR PROSTATE CANCER: ICD-10-CM

## 2019-01-14 DIAGNOSIS — I10 ESSENTIAL HYPERTENSION WITH GOAL BLOOD PRESSURE LESS THAN 140/90: Primary | ICD-10-CM

## 2019-01-14 LAB
ALBUMIN SERPL-MCNC: 3.8 G/DL (ref 3.4–5)
ALP SERPL-CCNC: 77 U/L (ref 40–150)
ALT SERPL W P-5'-P-CCNC: 42 U/L (ref 0–70)
ANION GAP SERPL CALCULATED.3IONS-SCNC: 6 MMOL/L (ref 3–14)
AST SERPL W P-5'-P-CCNC: 17 U/L (ref 0–45)
BILIRUB SERPL-MCNC: 0.3 MG/DL (ref 0.2–1.3)
BUN SERPL-MCNC: 16 MG/DL (ref 7–30)
CALCIUM SERPL-MCNC: 8.8 MG/DL (ref 8.5–10.1)
CHLORIDE SERPL-SCNC: 106 MMOL/L (ref 94–109)
CHOLEST SERPL-MCNC: 170 MG/DL
CO2 SERPL-SCNC: 27 MMOL/L (ref 20–32)
CREAT SERPL-MCNC: 0.76 MG/DL (ref 0.66–1.25)
EST. AVERAGE GLUCOSE BLD GHB EST-MCNC: 146 MG/DL
GFR SERPL CREATININE-BSD FRML MDRD: >90 ML/MIN/{1.73_M2}
GLUCOSE SERPL-MCNC: 108 MG/DL (ref 70–99)
HBA1C MFR BLD: 6.7 % (ref 0–5.6)
HDLC SERPL-MCNC: 55 MG/DL
LDLC SERPL CALC-MCNC: 101 MG/DL
NONHDLC SERPL-MCNC: 115 MG/DL
POTASSIUM SERPL-SCNC: 4 MMOL/L (ref 3.4–5.3)
PROT SERPL-MCNC: 7.1 G/DL (ref 6.8–8.8)
PSA SERPL-ACNC: 0.24 UG/L (ref 0–4)
SODIUM SERPL-SCNC: 139 MMOL/L (ref 133–144)
TRIGL SERPL-MCNC: 69 MG/DL
TSH SERPL DL<=0.005 MIU/L-ACNC: 0.76 MU/L (ref 0.4–4)

## 2019-01-14 PROCEDURE — 80053 COMPREHEN METABOLIC PANEL: CPT | Performed by: FAMILY MEDICINE

## 2019-01-14 PROCEDURE — 99214 OFFICE O/P EST MOD 30 MIN: CPT | Performed by: FAMILY MEDICINE

## 2019-01-14 PROCEDURE — 83036 HEMOGLOBIN GLYCOSYLATED A1C: CPT | Performed by: FAMILY MEDICINE

## 2019-01-14 PROCEDURE — 36415 COLL VENOUS BLD VENIPUNCTURE: CPT | Performed by: FAMILY MEDICINE

## 2019-01-14 PROCEDURE — 80061 LIPID PANEL: CPT | Performed by: FAMILY MEDICINE

## 2019-01-14 PROCEDURE — 84153 ASSAY OF PSA TOTAL: CPT | Performed by: FAMILY MEDICINE

## 2019-01-14 PROCEDURE — 84443 ASSAY THYROID STIM HORMONE: CPT | Performed by: FAMILY MEDICINE

## 2019-01-14 RX ORDER — METFORMIN HCL 500 MG
1000 TABLET, EXTENDED RELEASE 24 HR ORAL
Qty: 180 TABLET | Refills: 1 | Status: SHIPPED | OUTPATIENT
Start: 2019-01-14 | End: 2019-07-15

## 2019-01-14 RX ORDER — LOSARTAN POTASSIUM AND HYDROCHLOROTHIAZIDE 12.5; 5 MG/1; MG/1
1 TABLET ORAL DAILY
Qty: 90 TABLET | Refills: 1 | Status: SHIPPED | OUTPATIENT
Start: 2019-01-14 | End: 2019-07-15

## 2019-01-14 ASSESSMENT — PAIN SCALES - GENERAL: PAINLEVEL: NO PAIN (0)

## 2019-01-14 ASSESSMENT — MIFFLIN-ST. JEOR: SCORE: 2472.4

## 2019-01-14 NOTE — PATIENT INSTRUCTIONS
Order: [id number]   Status:  Edited Result - FINAL   Visible to patient:  No (Not Released) Dx:  Type 2 diabetes mellitus without comp...    Ref Range & Units 3:20 PM 4mo ago 9mo ago   Hemoglobin A1C 0 - 5.6 % 6.7 Abnormally high   7.3 Abnormally high  CM 7.1 Abnormally high  R, CM   Comment: Normal <5.7% Prediabetes 5.7-6.4%  Diabetes 6.5% or higher - adopted from ADA   consensus guidelines.    Resulting [facility]         Specimen Collected: 01/14/19  3:20 PM Last Resulted: 01/14/19  4:11 PM         Lab Flowsheet       Order Details       View Encounter       Lab and Collection Details       Routing       Result History         CM=Additional comments  R=Reference range differs from displayed range           Estimated Average Glucose   Order: [id number]   Status:  Final result   Visible to patient:  No (Not Released) Dx:  Essential hypertension with goal bloo...    Ref Range & Units 3:20 PM 4mo ago 9mo ago   Estimated Average Glucose mg/dL 146  163  157    Resulting [facility]         Specimen Collected: 01/14/19  3:20 PM Last Resulted: 01/14/19  4:11 PM         Lab Flowsheet       Order Details       View Encounter       Lab and Collection Details       Routing       Result History               TSH   Order: [id number]   Status:  Final result   Visible to patient:  No (Not Released) Dx:  Essential hypertension with goal bloo...    Ref Range & Units 3:20 PM 1yr ago   TSH 0.40 - 4.00 mU/L 0.76  0.78    Resulting [facility]         Specimen Collected: 01/14/19  3:20 PM Last Resulted: 01/14/19  3:55 PM         Lab Flowsheet       Order Details       View Encounter       Lab and Collection Details       Routing       Result History               Contains abnormal data Comprehensive metabolic panel   Order: [id number]   Status:  Final result   Visible to patient:  No (Not Released) Dx:  Essential hypertension with goal bloo...    Ref Range & Units 3:20 PM 4mo ago 9mo ago   Sodium 133 - 144 mmol/L 139  139   138    Potassium 3.4 - 5.3 mmol/L 4.0  4.5  4.5 CM   Chloride 94 - 109 mmol/L 106  105  105    Carbon Dioxide 20 - 32 mmol/L 27  29  24    Anion Gap 3 - 14 mmol/L 6  5  9    Glucose 70 - 99 mg/dL 108 Abnormally high   110 Abnormally high   104 Abnormally high     Comment: Fasting specimen   Urea Nitrogen 7 - 30 mg/dL 16  12  15    Creatinine 0.66 - 1.25 mg/dL 0.76  0.85  0.81    GFR Estimate >60 mL/min/ >90  >90 R, CM >90 R, CM   Comment: Non  GFR Calc   Starting 12/18/2018, serum creatinine based estimated GFR (eGFR) will be   calculated using the Chronic Kidney Disease Epidemiology Collaboration   (CKD-EPI) equation.    GFR Estimate If Black >60 mL/min/ >90  >90 R, CM >90 R, CM   Comment:  GFR Calc   Starting 12/18/2018, serum creatinine based estimated GFR (eGFR) will be   calculated using the Chronic Kidney Disease Epidemiology Collaboration   (CKD-EPI) equation.    Calcium 8.5 - 10.1 mg/dL 8.8  8.8  9.4    Bilirubin Total 0.2 - 1.3 mg/dL 0.3      Albumin 3.4 - 5.0 g/dL 3.8      Protein Total 6.8 - 8.8 g/dL 7.1      Alkaline Phosphatase 40 - 150 U/L 77      ALT 0 - 70 U/L 42      AST 0 - 45 U/L 17      Resulting Agency  [facility]         Specimen Collected: 01/14/19  3:20 PM Last Resulted: 01/14/19  3:55 PM         Lab Flowsheet       Order Details       View Encounter       Lab and Collection Details       Routing       Result History         CM=Additional comments  R=Reference range differs from displayed range           Contains abnormal data Lipid Profile (Chol, Trig, HDL, LDL calc)   Order: [id number]   Status:  Final result   Visible to patient:  No (Not Released) Dx:  Pure hypercholesterolemia    Ref Range & Units 3:20 PM  (1/14/19) 1yr ago  (1/9/18) 1yr ago  (7/10/17)   Cholesterol <200 mg/dL 170  214 Abnormally high      Triglycerides <150 mg/dL 69  67  84 CM   Comment: Fasting specimen   HDL Cholesterol >39 mg/dL 55  58  55    LDL Cholesterol Calculated <100 mg/dL  101 Abnormally high   143 Abnormally high   Abnormally high  CM   Comment: Above desirable:  100-129 mg/dl   Borderline High:  130-159 mg/dL   High:             160-189 mg/dL   Very high:       >189 mg/dl    Non HDL Cholesterol <130 mg/dL 115  156 Abnormally high   Abnormally high  CM   Resulting Agency  [facility]         Specimen Collected: 01/14/19  3:20 PM Last Resulted: 01/14/19  3:55 PM         Lab Flowsheet       Order Details       View Encounter       Lab and Collection Details       Routing       Result History         CM=Additional comments           Status of Other Orders     Completed     Estimated Average Glucose  01/14/19

## 2019-01-14 NOTE — NURSING NOTE
"Chief Complaint   Patient presents with     Diabetes       Initial /72   Pulse 73   Temp 97.3  F (36.3  C)   Ht 1.854 m (6' 1\")   Wt (!) 157.9 kg (348 lb)   SpO2 97%   BMI 45.91 kg/m   Estimated body mass index is 45.91 kg/m  as calculated from the following:    Height as of this encounter: 1.854 m (6' 1\").    Weight as of this encounter: 157.9 kg (348 lb).  Medication Reconciliation: complete    Steven Deras LPN  "

## 2019-04-16 DIAGNOSIS — E11.9 TYPE 2 DIABETES MELLITUS WITHOUT COMPLICATION, WITHOUT LONG-TERM CURRENT USE OF INSULIN (H): ICD-10-CM

## 2019-04-16 DIAGNOSIS — E78.00 PURE HYPERCHOLESTEROLEMIA: ICD-10-CM

## 2019-04-17 RX ORDER — ATORVASTATIN CALCIUM 10 MG/1
TABLET, FILM COATED ORAL
Qty: 90 TABLET | Refills: 0 | Status: SHIPPED | OUTPATIENT
Start: 2019-04-17 | End: 2019-07-15

## 2019-07-08 NOTE — PROGRESS NOTES
Subjective     Glen Rogers is a 53 year old male who presents to clinic today for the following health issues:    HPI   Diabetes Follow-up      How often are you checking your blood sugar? Not at all    What time of day are you checking your blood sugars (select all that apply)?      Have you had any blood sugars above 200?      Have you had any blood sugars below 70?      What symptoms do you notice when your blood sugar is low?  None    What concerns do you have today about your diabetes? None     Do you have any of these symptoms? (Select all that apply)  No numbness or tingling in feet.  No redness, sores or blisters on feet.  No complaints of excessive thirst.  No reports of blurry vision.  No significant changes to weight.     Have you had a diabetic eye exam in the last 12 months? No    BP Readings from Last 2 Encounters:   01/14/19 124/72   08/20/18 128/74     Hemoglobin A1C (%)   Date Value   01/14/2019 6.7 (H)   08/20/2018 7.3 (H)     LDL Cholesterol Calculated (mg/dL)   Date Value   01/14/2019 101 (H)   01/09/2018 143 (H)       Diabetes Management Resources    Amount of exercise or physical activity: 2-3 days/week for an average of greater than 60 minutes    Problems taking medications regularly: No    Medication side effects: metformin, makes him nauseous unless he takes before bed    Diet: keto ,low carbs,high fat.           Current Outpatient Medications   Medication Sig Dispense Refill     atorvastatin (LIPITOR) 10 MG tablet TAKE 1 TABLET BY MOUTH DAILY 90 tablet 0     blood glucose monitoring (DORIAN CONTOUR NEXT) test strip Use to test blood sugar 3 times daily or as directed. 2 Box 12     blood glucose monitoring (DORIAN MICROLET) lancets Use to test blood sugar 3 times daily or as directed. 1 Box 12     losartan-hydrochlorothiazide (HYZAAR) 50-12.5 MG tablet Take 1 tablet by mouth daily 90 tablet 1     metFORMIN (GLUCOPHAGE-XR) 500 MG 24 hr tablet Take 2 tablets (1,000 mg) by mouth daily (with  "dinner) 180 tablet 1         Reviewed and updated as needed this visit by Provider         Review of Systems   ROS COMP: CONSTITUTIONAL: NEGATIVE for fever, chills, change in weight  ENT/MOUTH: NEGATIVE for ear, mouth and throat problems  RESP: NEGATIVE for significant cough or SOB  CV: NEGATIVE for chest pain, palpitations or peripheral edema  ROS otherwise negative      Objective    /72   Pulse 74   Temp 98.2  F (36.8  C)   Ht 1.854 m (6' 1\")   Wt 148.8 kg (328 lb)   SpO2 98%   BMI 43.27 kg/m    Body mass index is 43.27 kg/m .  Physical Exam   GENERAL: healthy, alert and no distress  NECK: no adenopathy, no asymmetry, masses, or scars and thyroid normal to palpation  RESP: lungs clear to auscultation - no rales, rhonchi or wheezes  CV: regular rate and rhythm, normal S1 S2, no S3 or S4, no murmur, click or rub, no peripheral edema and peripheral pulses strong    Results for orders placed or performed in visit on 07/15/19   Albumin Random Urine Quantitative with Creat Ratio   Result Value Ref Range    Creatinine Urine 106 mg/dL    Albumin Urine mg/L 8 mg/L    Albumin Urine mg/g Cr 7.89 0 - 17 mg/g Cr   Hemoglobin A1c   Result Value Ref Range    Hemoglobin A1C 6.2 (H) 0 - 5.6 %             Assessment & Plan       ICD-10-CM    1. Type 2 diabetes mellitus without complication, without long-term current use of insulin (H) E11.9 Hemoglobin A1c     Albumin Random Urine Quantitative with Creat Ratio      WT down and A1C excellent . Continue current medications and behavioral changes.   See back in 6 months.  Needs eye exam- discussed.         BMI:   Estimated body mass index is 43.27 kg/m  as calculated from the following:    Height as of this encounter: 1.854 m (6' 1\").    Weight as of this encounter: 148.8 kg (328 lb).   Weight management plan: Discussed healthy diet and exercise guidelines            No follow-ups on file.    Zak Schrader MD  [facility] [place]      "

## 2019-07-15 ENCOUNTER — OFFICE VISIT (OUTPATIENT)
Dept: FAMILY MEDICINE | Facility: OTHER | Age: 54
End: 2019-07-15
Attending: FAMILY MEDICINE
Payer: COMMERCIAL

## 2019-07-15 VITALS
OXYGEN SATURATION: 98 % | DIASTOLIC BLOOD PRESSURE: 72 MMHG | TEMPERATURE: 98.2 F | HEIGHT: 73 IN | WEIGHT: 315 LBS | SYSTOLIC BLOOD PRESSURE: 118 MMHG | HEART RATE: 74 BPM | BODY MASS INDEX: 41.75 KG/M2

## 2019-07-15 DIAGNOSIS — E11.9 TYPE 2 DIABETES MELLITUS WITHOUT COMPLICATION, WITHOUT LONG-TERM CURRENT USE OF INSULIN (H): ICD-10-CM

## 2019-07-15 DIAGNOSIS — I10 ESSENTIAL HYPERTENSION: ICD-10-CM

## 2019-07-15 DIAGNOSIS — E78.00 PURE HYPERCHOLESTEROLEMIA: ICD-10-CM

## 2019-07-15 DIAGNOSIS — E11.9 TYPE 2 DIABETES MELLITUS WITHOUT COMPLICATION, WITHOUT LONG-TERM CURRENT USE OF INSULIN (H): Primary | ICD-10-CM

## 2019-07-15 LAB
CREAT UR-MCNC: 106 MG/DL
EST. AVERAGE GLUCOSE BLD GHB EST-MCNC: 131 MG/DL
HBA1C MFR BLD: 6.2 % (ref 0–5.6)
MICROALBUMIN UR-MCNC: 8 MG/L
MICROALBUMIN/CREAT UR: 7.89 MG/G CR (ref 0–17)

## 2019-07-15 PROCEDURE — 99213 OFFICE O/P EST LOW 20 MIN: CPT | Performed by: FAMILY MEDICINE

## 2019-07-15 PROCEDURE — 82043 UR ALBUMIN QUANTITATIVE: CPT | Performed by: FAMILY MEDICINE

## 2019-07-15 PROCEDURE — 36415 COLL VENOUS BLD VENIPUNCTURE: CPT | Performed by: FAMILY MEDICINE

## 2019-07-15 PROCEDURE — 83036 HEMOGLOBIN GLYCOSYLATED A1C: CPT | Performed by: FAMILY MEDICINE

## 2019-07-15 RX ORDER — ATORVASTATIN CALCIUM 10 MG/1
10 TABLET, FILM COATED ORAL DAILY
Qty: 90 TABLET | Refills: 1 | Status: SHIPPED | OUTPATIENT
Start: 2019-07-15 | End: 2020-01-15

## 2019-07-15 RX ORDER — METFORMIN HCL 500 MG
1000 TABLET, EXTENDED RELEASE 24 HR ORAL
Qty: 180 TABLET | Refills: 1 | Status: SHIPPED | OUTPATIENT
Start: 2019-07-15 | End: 2020-01-15

## 2019-07-15 RX ORDER — LOSARTAN POTASSIUM AND HYDROCHLOROTHIAZIDE 12.5; 5 MG/1; MG/1
1 TABLET ORAL DAILY
Qty: 90 TABLET | Refills: 1 | Status: SHIPPED | OUTPATIENT
Start: 2019-07-15 | End: 2020-01-15

## 2019-07-15 ASSESSMENT — PAIN SCALES - GENERAL: PAINLEVEL: NO PAIN (0)

## 2019-07-15 ASSESSMENT — MIFFLIN-ST. JEOR: SCORE: 2381.68

## 2019-07-15 NOTE — NURSING NOTE
"Chief Complaint   Patient presents with     Diabetes       Initial Blood Pressure 118/72   Pulse 74   Temperature 98.2  F (36.8  C)   Height 1.854 m (6' 1\")   Weight 148.8 kg (328 lb)   Oxygen Saturation 98%   Body Mass Index 43.27 kg/m   Estimated body mass index is 43.27 kg/m  as calculated from the following:    Height as of this encounter: 1.854 m (6' 1\").    Weight as of this encounter: 148.8 kg (328 lb).  Medication Reconciliation: complete  "

## 2019-07-31 ENCOUNTER — MEDICAL CORRESPONDENCE (OUTPATIENT)
Dept: HEALTH INFORMATION MANAGEMENT | Facility: CLINIC | Age: 54
End: 2019-07-31

## 2020-01-07 NOTE — PROGRESS NOTES
3  SUBJECTIVE:   CC: Glen Rogers is an 54 year old male who presents for preventive health visit.     Healthy Habits:    Do you get at least three servings of calcium containing foods daily (dairy, green leafy vegetables, etc.)? yes    Amount of exercise or daily activities, outside of work: none    Problems taking medications regularly No    Medication side effects: No    Have you had an eye exam in the past two years? yes    Do you see a dentist twice per year? yes    Do you have sleep apnea, excessive snoring or daytime drowsiness?yes sleep apnea      Diabetes Follow-up      How often are you checking your blood sugar? Not at all    What concerns do you have today about your diabetes? None     Do you have any of these symptoms? (Select all that apply)  No numbness or tingling in feet.  No redness, sores or blisters on feet.  No complaints of excessive thirst.  No reports of blurry vision.  No significant changes to weight.     Have you had a diabetic eye exam in the last 12 months? No    Diabetes Management Resources    Hyperlipidemia Follow-Up      Are you regularly taking any medication or supplement to lower your cholesterol?   Yes- lipitor    Are you having muscle aches or other side effects that you think could be caused by your cholesterol lowering medication?  No    Hypertension Follow-up      Do you check your blood pressure regularly outside of the clinic? No     Are you following a low salt diet? No    Are your blood pressures ever more than 140 on the top number (systolic) OR more   than 90 on the bottom number (diastolic), for example 140/90? No    BP Readings from Last 2 Encounters:   07/15/19 118/72   01/14/19 124/72     Hemoglobin A1C (%)   Date Value   07/15/2019 6.2 (H)   01/14/2019 6.7 (H)     LDL Cholesterol Calculated (mg/dL)   Date Value   01/14/2019 101 (H)   01/09/2018 143 (H)     Musculoskeletal problem/pain      Duration: 6 months worsening  ( on and off for years to decades)      Description  Location: low back left side    Intensity:  moderate    Accompanying signs and symptoms: radiation of pain to left knee, groin and buttocks    History  Previous similar problem: no   Previous evaluation:  none    Precipitating or alleviating factors:  Trauma or overuse: YES- walking on hard surfaces  Aggravating factors include: sitting and bending and riding in a car    Therapies tried and outcome: rest/inactivity, heat, ice, stretching and exercises    Morning is the best - later in day gets worse / on feet all day / walking 5-7 miles a day     Does do stretching / does have a roller he uses    50% back and 50% left leg/groin out of 100% of pain -- goes to left knee and no further    No formal PT or seen Chiropractor         hands      Duration: this past year work has them wearing gloves for safety but in summer, causes him to sweat and hands become rash covered    Description (location/character/radiation): to warm to wear gloves    Intensity:  moderate    Accompanying signs and symptoms: sweating and rash    History (similar episodes/previous evaluation): None    Precipitating or alleviating factors: None    Therapies tried and outcome: None    No rash now - just summer time with partial rubber containing gloves           Today's PHQ-2 Score:   PHQ-2 ( 1999 [employer]) 7/15/2019 1/14/2019   Q1: Little interest or pleasure in doing things 0 0   Q2: Feeling down, depressed or hopeless 0 0   PHQ-2 Score 0 0       Abuse: Current or Past(Physical, Sexual or Emotional)- No  Do you feel safe in your environment? Yes        Social History     Tobacco Use     Smoking status: Never Smoker     Smokeless tobacco: Never Used     Tobacco comment: Does have occasional cigar    Substance Use Topics     Alcohol use: Yes     Alcohol/week: 0.0 standard drinks     Comment: social     If you drink alcohol do you typically have >3 drinks per day or >7 drinks per week? No                      Last PSA:   PSA   Date Value  "Ref Range Status   01/14/2019 0.24 0 - 4 ug/L Final     Comment:     Assay Method:  Chemiluminescence using Siemens Vista analyzer       Reviewed orders with patient. Reviewed health maintenance and updated orders accordingly - Yes  Labs reviewed in EPIC    Reviewed and updated as needed this visit by clinical staff         Reviewed and updated as needed this visit by Provider        Past Medical History:   Diagnosis Date     Diabetes mellitus, type 2 (H) 3/18/2015     Essential hypertension 7/20/2015     FH: colon cancer     father     HTN (hypertension)     borderline     Obesity      GIULIANA on CPAP      PONV (postoperative nausea and vomiting)      Type 2 diabetes mellitus without complication (H) 7/20/2015      Past Surgical History:   Procedure Laterality Date     COLONOSCOPY N/A 5/22/2015    Repeat in 2020??/Procedure: COLONOSCOPY;  Surgeon: Ousmane Eddy DO;  Location: [facility]     ENT SURGERY      tonsillectomy       ROS:  CONSTITUTIONAL: NEGATIVE for fever, chills, change in weight  INTEGUMENTARY/SKIN: NEGATIVE for worrisome rashes, moles or lesions  EYES: NEGATIVE for vision changes or irritation  ENT: NEGATIVE for ear, mouth and throat problems  RESP: NEGATIVE for significant cough or SOB  CV: NEGATIVE for chest pain, palpitations or peripheral edema  GI: NEGATIVE for nausea, abdominal pain, heartburn, or change in bowel habits   male: negative for dysuria, hematuria, decreased urinary stream, erectile dysfunction, urethral discharge  MUSCULOSKELETAL: NEGATIVE for significant arthralgias or myalgia  NEURO: NEGATIVE for weakness, dizziness or paresthesias  PSYCHIATRIC: NEGATIVE for changes in mood or affect    OBJECTIVE:   /78   Pulse 84   Temp 98.2  F (36.8  C)   Ht 1.854 m (6' 1\")   Wt 145.6 kg (321 lb)   SpO2 96%   BMI 42.35 kg/m    EXAM:  GENERAL: healthy, alert and no distress/ obese  EYES: Eyes grossly normal to inspection, PERRL and conjunctivae and sclerae normal  HENT: ear canals " and TM's normal, nose and mouth without ulcers or lesions  NECK: no adenopathy, no asymmetry, masses, or scars and thyroid normal to palpation  RESP: lungs clear to auscultation - no rales, rhonchi or wheezes  CV: regular rate and rhythm, normal S1 S2, no S3 or S4, no murmur, click or rub, no peripheral edema and peripheral pulses strong  ABDOMEN: soft, nontender, no hepatosplenomegaly, no masses and bowel sounds normal   (male): normal male genitalia without lesions or urethral discharge, no hernia  MS: no gross musculoskeletal defects noted, no edema  - decrease ROM of left hip to at least 50% reduction with increase pain   Back - some tenderness in left paralumbar region distally   SKIN: no suspicious lesions or rashes  NEURO: Normal strength and tone, mentation intact and speech normal  PSYCH: mentation appears normal, affect normal/bright  LYMPH: no cervical, supraclavicular, axillary, or inguinal adenopathy  Diabetic foot exam: normal DP and PT pulses, no trophic changes or ulcerative lesions, normal sensory exam and normal monofilament exam    Results for orders placed or performed in visit on 01/15/20   Prostate spec antigen screen     Status: None   Result Value Ref Range    PSA 0.22 0 - 4 ug/L   CBC with platelets differential     Status: None   Result Value Ref Range    WBC 7.3 4.0 - 11.0 10e9/L    RBC Count 5.07 4.4 - 5.9 10e12/L    Hemoglobin 14.7 13.3 - 17.7 g/dL    Hematocrit 43.7 40.0 - 53.0 %    MCV 86 78 - 100 fl    MCH 29.0 26.5 - 33.0 pg    MCHC 33.6 31.5 - 36.5 g/dL    RDW 14.1 10.0 - 15.0 %    Platelet Count 251 150 - 450 10e9/L    Diff Method Automated Method     % Neutrophils 59.6 %    % Lymphocytes 29.8 %    % Monocytes 6.9 %    % Eosinophils 2.7 %    % Basophils 0.7 %    % Immature Granulocytes 0.3 %    Nucleated RBCs 0 0 /100    Absolute Neutrophil 4.4 1.6 - 8.3 10e9/L    Absolute Lymphocytes 2.2 0.8 - 5.3 10e9/L    Absolute Monocytes 0.5 0.0 - 1.3 10e9/L    Absolute Eosinophils 0.2 0.0  - 0.7 10e9/L    Absolute Basophils 0.1 0.0 - 0.2 10e9/L    Abs Immature Granulocytes 0.0 0 - 0.4 10e9/L    Absolute Nucleated RBC 0.0    Comprehensive metabolic panel     Status: Abnormal   Result Value Ref Range    Sodium 138 133 - 144 mmol/L    Potassium 4.2 3.4 - 5.3 mmol/L    Chloride 107 94 - 109 mmol/L    Carbon Dioxide 24 20 - 32 mmol/L    Anion Gap 7 3 - 14 mmol/L    Glucose 112 (H) 70 - 99 mg/dL    Urea Nitrogen 27 7 - 30 mg/dL    Creatinine 0.81 0.66 - 1.25 mg/dL    GFR Estimate >90 >60 mL/min/[1.73_m2]    GFR Estimate If Black >90 >60 mL/min/[1.73_m2]    Calcium 9.0 8.5 - 10.1 mg/dL    Bilirubin Total 0.3 0.2 - 1.3 mg/dL    Albumin 4.1 3.4 - 5.0 g/dL    Protein Total 7.8 6.8 - 8.8 g/dL    Alkaline Phosphatase 83 40 - 150 U/L    ALT 46 0 - 70 U/L    AST 16 0 - 45 U/L   Lipid Profile     Status: Abnormal   Result Value Ref Range    Cholesterol 195 <200 mg/dL    Triglycerides 108 <150 mg/dL    HDL Cholesterol 58 >39 mg/dL    LDL Cholesterol Calculated 115 (H) <100 mg/dL    Non HDL Cholesterol 137 (H) <130 mg/dL   Hemoglobin A1c     Status: Abnormal   Result Value Ref Range    Hemoglobin A1C 6.2 (H) 0 - 5.6 %     Narrative & Impression     PROCEDURE: XR LUMBAR SPINE 2-3 VIEWS 1/15/2020 2:52 PM     HISTORY: Chronic left-sided low back pain with left-sided sciatica;  Chronic left-sided low back pain with left-sided sciatica; DDD  (degenerative disc disease), lumbar; Hip pain, left     COMPARISONS: None.     TECHNIQUE: 3 views.     FINDINGS: There is grade 1 anterolisthesis of L4 on L5. This is  probably due to a defect in the pars interarticularis. There is mild  degenerative change with narrowing of the disc space at this level.  Mild subchondral sclerosis and spur formation is seen as well.     Alignment is otherwise maintained. Other disc spaces are well  preserved. There is some degenerative change in the lower thoracic  spine.                                                                         IMPRESSION: Scattered degenerative change. Mild grade 1  anterolisthesis of L4 on L5.     HECTOR KEARNEY MD    Narrative & Impression     PROCEDURE: XR PELVIS 1/2 VW 1/15/2020 2:51 PM     HISTORY: Chronic left-sided low back pain with left-sided sciatica;  Chronic left-sided low back pain with left-sided sciatica; DDD  (degenerative disc disease), lumbar; Hip pain, left     COMPARISONS: None.     TECHNIQUE: Single AP view.     FINDINGS: There is mild narrowing of hip joint space bilaterally.  There is bony prominence at the head neck junction of proximal femurs,  more marked on the left than on the right. 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     Sacroiliac joints appear fairly normal and symmetrical.                                                                        IMPRESSION: No acute fracture.     HECTOR KEARNEY MD              ASSESSMENT/PLAN:   1. Routine general medical examination at a health care facility  See below. WT loss main issue needs to happen   - Hemoglobin A1c; Future  - Lipid Profile; Future  - Comprehensive metabolic panel; Future  - CBC with platelets differential; Future  - Prostate spec antigen screen; Future    2. Type 2 diabetes mellitus without complication, without long-term current use of insulin (H)  Discussed. Good control . Need eye exam . Continue current medications and behavioral changes.   F/u in 5-6 months   - Hemoglobin A1c; Future  - Lipid Profile; Future  - Comprehensive metabolic panel; Future  - CBC with platelets differential; Future  - metFORMIN (GLUCOPHAGE-XR) 500 MG 24 hr tablet; Take 2 tablets (1,000 mg) by mouth daily (with dinner)  Dispense: 180 tablet; Refill: 2  - atorvastatin (LIPITOR) 10 MG tablet; Take 1 tablet (10 mg) by mouth daily  Dispense: 90 tablet; Refill: 2    3. Essential hypertension  Stable - Continue current medications and behavioral changes.   - Comprehensive metabolic panel; Future  - CBC with platelets  "differential; Future  - losartan-hydrochlorothiazide (HYZAAR) 50-12.5 MG tablet; Take 1 tablet by mouth daily  Dispense: 90 tablet; Refill: 2    4. Screening for prostate cancer  Stable   - Prostate spec antigen screen; Future    5. Morbid obesity due to excess calories (H)  Discussed. Need to get wt down. Will help hip and back.    - Lipid Profile; Future  - Comprehensive metabolic panel; Future    6. Pure hypercholesterolemia  Stable - Continue current medications and behavioral changes.   - atorvastatin (LIPITOR) 10 MG tablet; Take 1 tablet (10 mg) by mouth daily  Dispense: 90 tablet; Refill: 2    7. Chronic left-sided low back pain with left-sided sciatica  Some Lumbar ddd related and may have sciatica vs referred left leg pain due to hip.  Will send to PT -pain mgmt discussed. XR noted above   - XR Lumbar Spine 2/3 Views; Future  - XR Pelvis 1/2 Views; Future  - PHYSICAL THERAPY REFERRAL; Future    8. DDD (degenerative disc disease), lumbar  As above.   - XR Lumbar Spine 2/3 Views; Future  - XR Pelvis 1/2 Views; Future  - PHYSICAL THERAPY REFERRAL; Future    9. Hip pain, left  See below   - XR Lumbar Spine 2/3 Views; Future  - XR Pelvis 1/2 Views; Future  - ORTHOPEDICS ADULT REFERRAL    10. Primary osteoarthritis of left hip  Discussed. Pain mgmt - NSAIDS  Vs steroid shot? Vs in time will need new hip. Will send to ortho to talk more on this. Pt was surprised by this and has lots of concerns.   - ORTHOPEDICS ADULT REFERRAL    11. Spondylolisthesis of lumbar region  Discussed. WT loss and PT / good body mechanics. Discussed behavioral changes and proper body mechanics needed to help control patient's back pain.   - PHYSICAL THERAPY REFERRAL; Future    COUNSELING:  Reviewed preventive health counseling, as reflected in patient instructions       Regular exercise       Healthy diet/nutrition    Estimated body mass index is 43.27 kg/m  as calculated from the following:    Height as of 7/15/19: 1.854 m (6' 1\").    " Weight as of 7/15/19: 148.8 kg (328 lb).    Weight management plan: Discussed healthy diet and exercise guidelines     reports that he has never smoked. He has never used smokeless tobacco.      Counseling Resources:  ATP IV Guidelines  Pooled Cohorts Equation Calculator  FRAX Risk Assessment  ICSI Preventive Guidelines  Dietary Guidelines for Americans, 2010  USDA's MyPlate  ASA Prophylaxis  Lung CA Screening    Zak Schrader MD  [facility]

## 2020-01-15 ENCOUNTER — OFFICE VISIT (OUTPATIENT)
Dept: FAMILY MEDICINE | Facility: OTHER | Age: 55
End: 2020-01-15
Attending: FAMILY MEDICINE
Payer: COMMERCIAL

## 2020-01-15 ENCOUNTER — ANCILLARY PROCEDURE (OUTPATIENT)
Dept: GENERAL RADIOLOGY | Facility: OTHER | Age: 55
End: 2020-01-15
Attending: FAMILY MEDICINE
Payer: COMMERCIAL

## 2020-01-15 VITALS
OXYGEN SATURATION: 96 % | BODY MASS INDEX: 41.75 KG/M2 | WEIGHT: 315 LBS | DIASTOLIC BLOOD PRESSURE: 78 MMHG | SYSTOLIC BLOOD PRESSURE: 122 MMHG | HEART RATE: 84 BPM | HEIGHT: 73 IN | TEMPERATURE: 98.2 F

## 2020-01-15 DIAGNOSIS — G89.29 CHRONIC LEFT-SIDED LOW BACK PAIN WITH LEFT-SIDED SCIATICA: ICD-10-CM

## 2020-01-15 DIAGNOSIS — Z00.00 ROUTINE GENERAL MEDICAL EXAMINATION AT A HEALTH CARE FACILITY: Primary | ICD-10-CM

## 2020-01-15 DIAGNOSIS — Z12.5 SCREENING FOR PROSTATE CANCER: ICD-10-CM

## 2020-01-15 DIAGNOSIS — M25.552 HIP PAIN, LEFT: ICD-10-CM

## 2020-01-15 DIAGNOSIS — E78.00 PURE HYPERCHOLESTEROLEMIA: ICD-10-CM

## 2020-01-15 DIAGNOSIS — M54.42 CHRONIC LEFT-SIDED LOW BACK PAIN WITH LEFT-SIDED SCIATICA: ICD-10-CM

## 2020-01-15 DIAGNOSIS — M51.369 DDD (DEGENERATIVE DISC DISEASE), LUMBAR: ICD-10-CM

## 2020-01-15 DIAGNOSIS — I10 ESSENTIAL HYPERTENSION: ICD-10-CM

## 2020-01-15 DIAGNOSIS — E11.9 TYPE 2 DIABETES MELLITUS WITHOUT COMPLICATION, WITHOUT LONG-TERM CURRENT USE OF INSULIN (H): ICD-10-CM

## 2020-01-15 DIAGNOSIS — M43.16 SPONDYLOLISTHESIS OF LUMBAR REGION: ICD-10-CM

## 2020-01-15 DIAGNOSIS — Z00.00 ROUTINE GENERAL MEDICAL EXAMINATION AT A HEALTH CARE FACILITY: ICD-10-CM

## 2020-01-15 DIAGNOSIS — E66.01 MORBID OBESITY DUE TO EXCESS CALORIES (H): ICD-10-CM

## 2020-01-15 DIAGNOSIS — M16.12 PRIMARY OSTEOARTHRITIS OF LEFT HIP: ICD-10-CM

## 2020-01-15 LAB
ALBUMIN SERPL-MCNC: 4.1 G/DL (ref 3.4–5)
ALP SERPL-CCNC: 83 U/L (ref 40–150)
ALT SERPL W P-5'-P-CCNC: 46 U/L (ref 0–70)
ANION GAP SERPL CALCULATED.3IONS-SCNC: 7 MMOL/L (ref 3–14)
AST SERPL W P-5'-P-CCNC: 16 U/L (ref 0–45)
BASOPHILS # BLD AUTO: 0.1 10E9/L (ref 0–0.2)
BASOPHILS NFR BLD AUTO: 0.7 %
BILIRUB SERPL-MCNC: 0.3 MG/DL (ref 0.2–1.3)
BUN SERPL-MCNC: 27 MG/DL (ref 7–30)
CALCIUM SERPL-MCNC: 9 MG/DL (ref 8.5–10.1)
CHLORIDE SERPL-SCNC: 107 MMOL/L (ref 94–109)
CHOLEST SERPL-MCNC: 195 MG/DL
CO2 SERPL-SCNC: 24 MMOL/L (ref 20–32)
CREAT SERPL-MCNC: 0.81 MG/DL (ref 0.66–1.25)
DIFFERENTIAL METHOD BLD: NORMAL
EOSINOPHIL # BLD AUTO: 0.2 10E9/L (ref 0–0.7)
EOSINOPHIL NFR BLD AUTO: 2.7 %
ERYTHROCYTE [DISTWIDTH] IN BLOOD BY AUTOMATED COUNT: 14.1 % (ref 10–15)
EST. AVERAGE GLUCOSE BLD GHB EST-MCNC: 131 MG/DL
GFR SERPL CREATININE-BSD FRML MDRD: >90 ML/MIN/{1.73_M2}
GLUCOSE SERPL-MCNC: 112 MG/DL (ref 70–99)
HBA1C MFR BLD: 6.2 % (ref 0–5.6)
HCT VFR BLD AUTO: 43.7 % (ref 40–53)
HDLC SERPL-MCNC: 58 MG/DL
HGB BLD-MCNC: 14.7 G/DL (ref 13.3–17.7)
IMM GRANULOCYTES # BLD: 0 10E9/L (ref 0–0.4)
IMM GRANULOCYTES NFR BLD: 0.3 %
LDLC SERPL CALC-MCNC: 115 MG/DL
LYMPHOCYTES # BLD AUTO: 2.2 10E9/L (ref 0.8–5.3)
LYMPHOCYTES NFR BLD AUTO: 29.8 %
MCH RBC QN AUTO: 29 PG (ref 26.5–33)
MCHC RBC AUTO-ENTMCNC: 33.6 G/DL (ref 31.5–36.5)
MCV RBC AUTO: 86 FL (ref 78–100)
MONOCYTES # BLD AUTO: 0.5 10E9/L (ref 0–1.3)
MONOCYTES NFR BLD AUTO: 6.9 %
NEUTROPHILS # BLD AUTO: 4.4 10E9/L (ref 1.6–8.3)
NEUTROPHILS NFR BLD AUTO: 59.6 %
NONHDLC SERPL-MCNC: 137 MG/DL
NRBC # BLD AUTO: 0 10*3/UL
NRBC BLD AUTO-RTO: 0 /100
PLATELET # BLD AUTO: 251 10E9/L (ref 150–450)
POTASSIUM SERPL-SCNC: 4.2 MMOL/L (ref 3.4–5.3)
PROT SERPL-MCNC: 7.8 G/DL (ref 6.8–8.8)
PSA SERPL-ACNC: 0.22 UG/L (ref 0–4)
RBC # BLD AUTO: 5.07 10E12/L (ref 4.4–5.9)
SODIUM SERPL-SCNC: 138 MMOL/L (ref 133–144)
TRIGL SERPL-MCNC: 108 MG/DL
WBC # BLD AUTO: 7.3 10E9/L (ref 4–11)

## 2020-01-15 PROCEDURE — 80061 LIPID PANEL: CPT | Performed by: FAMILY MEDICINE

## 2020-01-15 PROCEDURE — 85025 COMPLETE CBC W/AUTO DIFF WBC: CPT | Performed by: FAMILY MEDICINE

## 2020-01-15 PROCEDURE — 36415 COLL VENOUS BLD VENIPUNCTURE: CPT | Performed by: FAMILY MEDICINE

## 2020-01-15 PROCEDURE — 84153 ASSAY OF PSA TOTAL: CPT | Performed by: FAMILY MEDICINE

## 2020-01-15 PROCEDURE — 83036 HEMOGLOBIN GLYCOSYLATED A1C: CPT | Performed by: FAMILY MEDICINE

## 2020-01-15 PROCEDURE — 99396 PREV VISIT EST AGE 40-64: CPT | Performed by: FAMILY MEDICINE

## 2020-01-15 PROCEDURE — 99212 OFFICE O/P EST SF 10 MIN: CPT | Mod: 25 | Performed by: FAMILY MEDICINE

## 2020-01-15 PROCEDURE — 72100 X-RAY EXAM L-S SPINE 2/3 VWS: CPT | Mod: TC

## 2020-01-15 PROCEDURE — 72170 X-RAY EXAM OF PELVIS: CPT | Mod: TC

## 2020-01-15 PROCEDURE — 80053 COMPREHEN METABOLIC PANEL: CPT | Performed by: FAMILY MEDICINE

## 2020-01-15 RX ORDER — METFORMIN HCL 500 MG
1000 TABLET, EXTENDED RELEASE 24 HR ORAL
Qty: 180 TABLET | Refills: 2 | Status: SHIPPED | OUTPATIENT
Start: 2020-01-15 | End: 2021-08-11

## 2020-01-15 RX ORDER — LOSARTAN POTASSIUM AND HYDROCHLOROTHIAZIDE 12.5; 5 MG/1; MG/1
1 TABLET ORAL DAILY
Qty: 90 TABLET | Refills: 2 | Status: SHIPPED | OUTPATIENT
Start: 2020-01-15 | End: 2021-02-10

## 2020-01-15 RX ORDER — ATORVASTATIN CALCIUM 10 MG/1
10 TABLET, FILM COATED ORAL DAILY
Qty: 90 TABLET | Refills: 2 | Status: SHIPPED | OUTPATIENT
Start: 2020-01-15 | End: 2021-02-23

## 2020-01-15 ASSESSMENT — PAIN SCALES - GENERAL: PAINLEVEL: MODERATE PAIN (5)

## 2020-01-15 ASSESSMENT — MIFFLIN-ST. JEOR: SCORE: 2344.93

## 2020-01-15 NOTE — PATIENT INSTRUCTIONS
Status:  Edited Result - FINAL   Visible to patient:  No (Not Released) Dx:  Routine general medical examination a...    Ref Range & Units 1:45 PM 6mo ago   Hemoglobin A1C 0 - 5.6 % 6.2High   6.2High  CM   Comment: Normal <5.7% Prediabetes 5.7-6.4%  Diabetes 6.5% or higher - adopted from ADA   consensus guidelines.    Resulting [facility]         Specimen Collected: 01/15/20  1:45 PM Last Resulted: 01/15/20  2:22 PM         Lab Flowsheet       Order Details       View Encounter       Lab and Collection Details       Routing       Result History         CM=Additional comments           Estimated Average Glucose   Order: [id number]   Status:  Final result   Visible to patient:  No (Not Released) Dx:  Routine general medical examination a...    Ref Range & Units 1:45 PM 6mo ago   Estimated Average Glucose mg/dL 131  131    Resulting [facility]         Specimen Collected: 01/15/20  1:45 PM Last Resulted: 01/15/20  2:22 PM         Lab Flowsheet       Order Details       View Encounter       Lab and Collection Details       Routing       Result History               Prostate spec antigen screen   Order: [id number]   Status:  Final result   Visible to patient:  No (Not Released) Dx:  Routine general medical examination a...    Ref Range & Units 1:45 PM 1yr ago   PSA 0 - 4 ug/L 0.22  0.24 CM   Comment: Assay Method:  Chemiluminescence using Siemens Vista analyzer   [place] [facility] [place]         Specimen Collected: 01/15/20  1:45 PM Last Resulted: 01/15/20  2:12 PM         Lab Flowsheet       Order Details       View Encounter       Lab and Collection Details       Routing       Result History         CM=Additional comments           Contains abnormal data Comprehensive metabolic panel   Order: [id number]    Status:  Final result   Visible to patient:  No (Not Released) Dx:  Routine general medical examination a...    Ref Range & Units 1:45 PM 1yr ago   Sodium 133 - 144 mmol/L 138  139    Potassium 3.4 - 5.3 mmol/L 4.2  4.0    Chloride 94 - 109 mmol/L 107  106    Carbon Dioxide 20 - 32 mmol/L 24  27    Anion Gap 3 - 14 mmol/L 7  6    Glucose 70 - 99 mg/dL 112High   108High  CM   Comment: Fasting specimen   Urea Nitrogen 7 - 30 mg/dL 27  16    Creatinine 0.66 - 1.25 mg/dL 0.81  0.76    GFR Estimate >60 mL/min/ >90  >90 CM   Comment: Non  GFR Calc   Starting 12/18/2018, serum creatinine based estimated GFR (eGFR) will be   calculated using the Chronic Kidney Disease Epidemiology Collaboration   (CKD-EPI) equation.    GFR Estimate If Black >60 mL/min/ >90  >90 CM   Comment:  GFR Calc   Starting 12/18/2018, serum creatinine based estimated GFR (eGFR) will be   calculated using the Chronic Kidney Disease Epidemiology Collaboration   (CKD-EPI) equation.    Calcium 8.5 - 10.1 mg/dL 9.0  8.8    Bilirubin Total 0.2 - 1.3 mg/dL 0.3  0.3    Albumin 3.4 - 5.0 g/dL 4.1  3.8    Protein Total 6.8 - 8.8 g/dL 7.8  7.1    Alkaline Phosphatase 40 - 150 U/L 83  77    ALT 0 - 70 U/L 46  42    AST 0 - 45 U/L 16  17    Resulting Agency  [facility]         Specimen Collected: 01/15/20  1:45 PM Last Resulted: 01/15/20  2:08 PM         Lab Flowsheet       Order Details       View Encounter       Lab and Collection Details       Routing       Result History         CM=Additional comments           Contains abnormal data Lipid Profile   Order: [id number]   Status:  Final result   Visible to patient:  No (Not Released) Dx:  Routine general medical examination a...    Ref Range & Units 1:45 PM 1yr ago   Cholesterol <200 mg/dL 195  170    Triglycerides <150 mg/dL 108  69 CM   Comment: Fasting specimen   HDL Cholesterol >39 mg/dL 58  55    LDL  Cholesterol Calculated <100 mg/dL 115High   101High  CM   Comment: Above desirable:  100-129 mg/dl   Borderline High:  130-159 mg/dL   High:             160-189 mg/dL   Very high:       >189 mg/dl    Non HDL Cholesterol <130 mg/dL 137High   115    Comment: Above Desirable:  130-159 mg/dl   Borderline high:  160-189 mg/dl   High:             190-219 mg/dl   Very high:       >219 mg/dl    Resulting Agency  [facility]         Specimen Collected: 01/15/20  1:45 PM Last Resulted: 01/15/20  2:08 PM         Lab Flowsheet       Order Details       View Encounter       Lab and Collection Details       Routing       Result History         CM=Additional comments           CBC with platelets differential   Order: [id number]   Status:  Final result   Visible to patient:  No (Not Released) Dx:  Routine general medical examination a...    Ref Range & Units 1:45 PM 2yr ago   WBC 4.0 - 11.0 10e9/L 7.3  8.2    RBC Count 4.4 - 5.9 10e12/L 5.07  4.92    Hemoglobin 13.3 - 17.7 g/dL 14.7  14.5    Hematocrit 40.0 - 53.0 % 43.7  42.6    MCV 78 - 100 fl 86  87    MCH 26.5 - 33.0 pg 29.0  29.5    MCHC 31.5 - 36.5 g/dL 33.6  34.0    RDW 10.0 - 15.0 % 14.1  14.1    Platelet Count 150 - 450 10e9/L 251  231    Diff Method  Automated Method  Automated Method    % Neutrophils % 59.6  60.0    % Lymphocytes % 29.8  29.9    % Monocytes % 6.9  6.7    % Eosinophils % 2.7  2.4    % Basophils % 0.7  0.5    % Immature Granulocytes % 0.3  0.5    Nucleated RBCs 0 /100 0  0    Absolute Neutrophil 1.6 - 8.3 10e9/L 4.4  4.9    Absolute Lymphocytes 0.8 - 5.3 10e9/L 2.2  2.5    Absolute Monocytes 0.0 - 1.3 10e9/L 0.5  0.6    Absolute Eosinophils 0.0 - 0.7 10e9/L 0.2  0.2    Absolute Basophils 0.0 - 0.2 10e9/L 0.1  0.0    Abs Immature Granulocytes 0 - 0.4 10e9/L 0.0  0.0    Absolute Nucleated RBC  0.0  0.0    Resulting Agency  [facility]  [facility]         Specimen Collected: 01/15/20  1:45 PM Last Resulted: 01/15/20  1:52 PM         Lab Flowsheet       Order Details       View Encounter       Lab and Collection Details       Routing       Result History               Status of Other Orders     Completed     Estimated Average Glucose  01/15/20

## 2020-01-15 NOTE — NURSING NOTE
"Chief Complaint   Patient presents with     Physical       Initial /78   Pulse 84   Temp 98.2  F (36.8  C)   Ht 1.854 m (6' 1\")   Wt 145.6 kg (321 lb)   SpO2 96%   BMI 42.35 kg/m   Estimated body mass index is 42.35 kg/m  as calculated from the following:    Height as of this encounter: 1.854 m (6' 1\").    Weight as of this encounter: 145.6 kg (321 lb).  Medication Reconciliation: complete  Steven Deras LPN  "

## 2020-01-22 ENCOUNTER — TRANSFERRED RECORDS (OUTPATIENT)
Dept: HEALTH INFORMATION MANAGEMENT | Facility: CLINIC | Age: 55
End: 2020-01-22

## 2020-01-31 ENCOUNTER — HOSPITAL ENCOUNTER (OUTPATIENT)
Dept: PHYSICAL THERAPY | Facility: HOSPITAL | Age: 55
Setting detail: THERAPIES SERIES
End: 2020-01-31
Attending: FAMILY MEDICINE
Payer: COMMERCIAL

## 2020-01-31 DIAGNOSIS — M43.16 SPONDYLOLISTHESIS OF LUMBAR REGION: ICD-10-CM

## 2020-01-31 DIAGNOSIS — M54.42 CHRONIC LEFT-SIDED LOW BACK PAIN WITH LEFT-SIDED SCIATICA: ICD-10-CM

## 2020-01-31 DIAGNOSIS — M51.369 DDD (DEGENERATIVE DISC DISEASE), LUMBAR: ICD-10-CM

## 2020-01-31 DIAGNOSIS — G89.29 CHRONIC LEFT-SIDED LOW BACK PAIN WITH LEFT-SIDED SCIATICA: ICD-10-CM

## 2020-01-31 PROCEDURE — 97110 THERAPEUTIC EXERCISES: CPT | Mod: GP

## 2020-01-31 PROCEDURE — 97162 PT EVAL MOD COMPLEX 30 MIN: CPT | Mod: GP

## 2020-01-31 NOTE — PROGRESS NOTES
Initial Physical Therapy Evaluation      Name: Glen Rogers MRN# [id number]   Age: 54 year old YOB: 1965     Date of Consultation: January 31, 2020  Primary care provider: Zak Schrader    Referring Physician: Dr. Schrader  Orders: Eval and Treat  Medical Diagnosis: chronic L sided low back pain, DDD lumbar  Onset of Illness/Injury: n/a    Reason for PT Visit: Patient is a 53 y/o male who presents with chronic low back and L sided hip issues. States that he has had back pain for many years. Activities such as lifting, bending, and sitting for lengthy periods have been painful. Originally thought the the pain was from the back, but now believes the pain to be associated to the hip area. Does have a hip replacement scheduled with Dr. Bryant in early March - anterior hip replacement.     No prior surgical history that is significant. Back pain and L hip discomfort has progressively worsened over the past 6 months. Patient works at [employer] in the [employer].   Prior Level of Function: long history of back and L sided hip pain   Pain: 3-4/10 at rest currently      Community Support/Living Environment/Employment History: works full time in [employer] at [employer]     Patient/Family Goal: decrease pain    Fall Screen:   Have you fallen 2 or more times in the last year? No  Have you fallen and had an injury in the last year? No  Timed up & go:   Is patient a fall risk? No    Past Medical History:   Past Medical History:   Diagnosis Date     Diabetes mellitus, type 2 (H) 3/18/2015     Essential hypertension 7/20/2015     FH: colon cancer     father     HTN (hypertension)     borderline     Obesity      GIULIANA on CPAP      PONV (postoperative nausea and vomiting)      Type 2 diabetes mellitus without complication (H) 7/20/2015       Past Surgical History:  Past Surgical History:   Procedure Laterality Date     COLONOSCOPY N/A 5/22/2015    Repeat in 2020??/Procedure: COLONOSCOPY;  Surgeon: Ousmane Eddy,  "DO;  Location: [facility]     ENT SURGERY      tonsillectomy       Medications:   Current Outpatient Medications   Medication Sig     atorvastatin (LIPITOR) 10 MG tablet Take 1 tablet (10 mg) by mouth daily     blood glucose monitoring (DORIAN CONTOUR NEXT) test strip Use to test blood sugar 3 times daily or as directed.     blood glucose monitoring (DORIAN MICROLET) lancets Use to test blood sugar 3 times daily or as directed.     losartan-hydrochlorothiazide (HYZAAR) 50-12.5 MG tablet Take 1 tablet by mouth daily     metFORMIN (GLUCOPHAGE-XR) 500 MG 24 hr tablet Take 2 tablets (1,000 mg) by mouth daily (with dinner)     No current facility-administered medications for this encounter.        Imaging: x ray of lumbar spine on 1/15/20:  IMPRESSION: Scattered degenerative change. Mild grade 1  anterolisthesis of L4 on L5.     HECTOR KEARNEY MD    Musculoskeletal Findings:   Patient goals: \"decrease pain.\"    OBJECTIVE  Observation: Appears to PT in no acute distress  Palpation: pain with palpation to L anterior hip flexors and L PSIS and low back area    Gait: Normal - toe out on bilateral LES - high degree of supination on R LE during weight bearing and ambulation   Assistive devices: no assistive devices    Posture: Normal erect.  Sitting Posture:   Standing Posture:  Correction of posture:     Neurological Assessment:  Reflexes - Right:  Patellar: 2+  Achilles: 2+    Reflexes - Left:  Patellar: 2+  Achilles: 2+    Myotomes:  Right lower extremity: negative  Left lower extremity: negative    Range of Motion:  Active Lumbar Spine:       Flexion: mild limitation       Extension: mild limitation - pain       Right sidebend: WNLs       Left sidebend: WNLs       Right rotation: WNLs       Left rotation: WNLs    Right Lower Extremity Range of Motion: within normal limits    Left Lower Extremity Range of Motion: limitation with passive hip flexion, IR and abduction - pain reproduction    Strength:  Abdominal Strength: Fair "   Right Lower Extremity Strength: 5/5 except 4/5 hip abduction, 4/5 hip extension  Left Lower Extremity Strength: 5/5 except 3+/5 hip flexion, 4/5 knee flxion, 4/5 hip abduction, 4/5 hip extension    Special Tests:    Spine Special Tests:  Slump:    Left: -              Right: -  Straight Leg Raise:    Left: -      Right: -  Traction: NT  Prone Knee Bend:     Left:  + anterior thigh tension     Right: + anterior thigh tension  Compression:    Left:       Right:  Repeated Movement Testing:   NT  Passive Intervertebral Accessory Movements: slight discomfort with central and unilateral Pas through lumbar spine    Prone Instability Test:   NT    Hip Special Testss  Robbie:    -         ALIZE:     + on L for hip pain              Scour Test:  NT                         Huber Test:   NT                   Leg Length:        NT                  Trendelenburg s Sign:     -                  Patient's Concordant Sign: L sided buttock, groin, and back pain with hip flexion based movements that stress the L hip join    Outcome Measures:   Pavan STarT Sub-Score (Q5-9): 2  Pavan STarT Total Score (all 9): 3  Oswestry Score (%): 15.56 %     Prognosis/Plan of Care: Good  Appropriate for Physical Therapy Intervention: Yes     GOALS:   Short-term goals:  To be achieved in 2-3 weeks:    Instruct in home program  1.) Patient will be independent with a short-term home exercise program.  2.) Patient will understand biomechanical stressors of the lumbar spine in order to make modifications to activities to reduce further risk of injury.  3.) Patient will demonstrate proper body/lifting mechanics with abdominal bracing without cueing.  4.) Patient will report a 25% or greater improvement in symptoms in order to allow for increased comfort with activities of daily living.       Long-term goals:  To be achieved in 6-8 weeks:    Self management of symptoms  Pain free activities of daily living  Return to previous level of function  1.) Improve  score on Oswestry Disability Index by 50% to correlate with clinically significant change.  2.) Patient will report a 50% or greater improvement in symptoms in order to allow for increased comfort with activities of daily living    Discharge goals: Patient will be independent with home program for self-management of symptoms.      Planned Interventions: Therapeutic exercise, manual therapy, therapeutic activity, patient education    Patient presents today with signs and symptoms consistent of L hip OA with restricted mobility and associated L sided low back pain. Therapy today consisted of evaluation, patient education, and therapeutic exercise. Patient would benefit from continued PT sessions addressing overall pain and dysfunction with use of appropriate interventions.    Treatment Rendered/Intervention:  Evaluation completed as described above followed by discussion of exam findings and plan of care.    Therapeutic exercise: Patient instructed in and demonstrated proper performance of home exercise program consisting of:step off self hip distraction x 3 mins, quadraped rock backs, glute bridge, hooklying clamshell, self myosfascial release with stick    Educated in posture principles and neutral spine positioning. Patient was instructed in home use of heat and/or ice for pain management    Clinical Impressions:  Criteria for Skilled Therapeutic Intervention Met: Yes  PT Diagnosis: L hip OA with associated L sided low back pain  Influenced by the following impairments: pain, weakness, decreased mobility  Functional limitations due to impairment: difficulty with sitting for lengthy periods, standing  Clinical presentation: Stable/Uncomplicated  Clinical presentation rationale: clinical judgement  Clinical Decision making (complexity): Moderate Complexity  Predicted Duration of Therapy Intervention (days/wks): 8 weeks  Risks and Benefits of therapy have been explained: Yes  Patient, Family & other staff in agreement  with plan of care: Yes  Comments:     Patient's symptoms do present as significant L sided hip OA that is referring pain to L sided buttock, PSIS - back area. Patient would benefit from therapy sessions maximizing hip mobility and strength leading up to surgical date for L hip replacement ([facility]KY) in early March      Total Evaluation Time: 50 minutes

## 2020-02-04 ENCOUNTER — MEDICAL CORRESPONDENCE (OUTPATIENT)
Dept: HEALTH INFORMATION MANAGEMENT | Facility: CLINIC | Age: 55
End: 2020-02-04

## 2020-02-07 ENCOUNTER — HOSPITAL ENCOUNTER (OUTPATIENT)
Dept: PHYSICAL THERAPY | Facility: HOSPITAL | Age: 55
Setting detail: THERAPIES SERIES
End: 2020-02-07
Attending: FAMILY MEDICINE
Payer: COMMERCIAL

## 2020-02-07 PROCEDURE — 97110 THERAPEUTIC EXERCISES: CPT | Mod: GP

## 2020-02-12 NOTE — H&P (VIEW-ONLY)
[facility] - [place]  [address]  [place] [address]  [phone]  Dept: [phone]    PRE-OP EVALUATION:  Today's date: 2020    Glen Rogers (: 1965) presents for pre-operative evaluation assessment as requested by Dr. Bryant.  He requires evaluation and anesthesia risk assessment prior to undergoing surgery/procedure for treatment of  Left hip .    Proposed Surgery/ Procedure: DIRECT ANTERIOR LEFT TOTAL HIP ARTHROPLASTY  Date of Surgery/ Procedure: 3/3/2020  Time of Surgery/ Procedure: D  [place] [facility] [place] Physician: Zak Schrader  Type of Anesthesia Anticipated: Spinal    Patient has a Health Care Directive or Living Will:  NO    1. NO - Do you have a history of heart attack, stroke, stent, bypass or surgery on an artery in the head, neck, heart or legs?  2. NO - Do you ever have any pain or discomfort in your chest?  3. NO - Do you have a history of  Heart Failure?  4. NO - Are you troubled by shortness of breath when: walking on the level, up a slight hill or at night?  5. NO - Do you currently have a cold, bronchitis or other respiratory infection?  6. NO - Do you have a cough, shortness of breath or wheezing?  7. NO - Do you sometimes get pains in the calves of your legs when you walk?  8. YES - DO YOU OR ANYONE IN YOUR FAMILY HAVE PREVIOUS HISTORY OF BLOOD CLOTS? Mom-- had stroke/ hemorrhagic  No blood clots   9. NO - Do you or does anyone in your family have a serious bleeding problem such as prolonged bleeding following surgeries or cuts?  10. NO - Have you ever had problems with anemia or been told to take iron pills?  11. NO - Have you had any abnormal blood loss such as black, tarry or bloody stools, or abnormal vaginal bleeding?  12. NO - Have you ever had a blood transfusion?  13. NO - Have you or any of your relatives ever had problems with anesthesia?  14. YES - DO YOU HAVE SLEEP APNEA, EXCESSIVE SNORING OR DAYTIME DROWSINESS? Yes -  CPAP  15. NO - Do you have any prosthetic heart valves?  16. NO - Do you have prosthetic joints?  17. NO - Is there any chance that you may be pregnant?      HPI:     HPI related to upcoming procedure:   55 yO male  presents for cardiopulmonary/general clearance to undergo the above procedure.  His surgeon listed above has asked for this clearance to undergo anesthesia. Pt has had this condition for approximately over a year .   Overall pt is of good health and has not  had any perioperative complications with previous surgeries.          DIABETES - Patient has a longstanding history of DiabetesType Type II . Patient is being treated with diet, oral agents and exercise and denies significant side effects. Control has been good. Complicating factors include but are not limited to: hypertension, hyperlipidemia and morbid obesity .     HYPERTENSION - Patient has longstanding history of HTN , currently denies any symptoms referable to elevated blood pressure. Specifically denies chest pain, palpitations, dyspnea, orthopnea, PND or peripheral edema. Blood pressure readings have been in normal range. Current medication regimen is as listed below. Patient denies any side effects of medication.       MEDICAL HISTORY:     Patient Active Problem List    Diagnosis Date Noted     Chronic left-sided low back pain with left-sided sciatica 01/15/2020     Priority: Medium     Type 2 diabetes mellitus without complication, without long-term current use of insulin (H) 10/28/2016     Priority: Medium     ACP (advance care planning) 07/11/2016     Priority: Medium     Advance Care Planning 7/11/2016: ACP Review of Chart / Resources Provided:  Reviewed chart for advance care plan.  [place] [facility] Rogers has been provided information and resources to begin or update their advance care plan.  Added by Noreen Horta             Essential hypertension with goal blood pressure less than 140/90 07/08/2016     Priority: Medium     Pure  hypercholesterolemia 07/08/2016     Priority: Medium     Morbid obesity due to excess calories (H) 04/06/2016     Priority: Medium     FH: colon cancer      Priority: Medium     father        Past Medical History:   Diagnosis Date     Diabetes mellitus, type 2 (H) 3/18/2015     Essential hypertension 7/20/2015     FH: colon cancer     father     HTN (hypertension)     borderline     Obesity      GIULIANA on CPAP      PONV (postoperative nausea and vomiting)      Type 2 diabetes mellitus without complication (H) 7/20/2015     Past Surgical History:   Procedure Laterality Date     COLONOSCOPY N/A 5/22/2015    Repeat in 2020??/Procedure: COLONOSCOPY;  Surgeon: Ousmane Eddy DO;  Location: [facility]     ENT SURGERY      tonsillectomy     Current Outpatient Medications   Medication Sig Dispense Refill     atorvastatin (LIPITOR) 10 MG tablet Take 1 tablet (10 mg) by mouth daily 90 tablet 2     blood glucose monitoring (DORIAN CONTOUR NEXT) test strip Use to test blood sugar 3 times daily or as directed. 2 Box 12     blood glucose monitoring (DORIAN MICROLET) lancets Use to test blood sugar 3 times daily or as directed. 1 Box 12     losartan-hydrochlorothiazide (HYZAAR) 50-12.5 MG tablet Take 1 tablet by mouth daily 90 tablet 2     metFORMIN (GLUCOPHAGE-XR) 500 MG 24 hr tablet Take 2 tablets (1,000 mg) by mouth daily (with dinner) 180 tablet 2     OTC products: None, except as noted above    Allergies   Allergen Reactions     Morphine Hives     Zocor [Simvastatin] Muscle Pain (Myalgia)      Latex Allergy: NO    Social History     Tobacco Use     Smoking status: Never Smoker     Smokeless tobacco: Never Used     Tobacco comment: Does have occasional cigar    Substance Use Topics     Alcohol use: Yes     Alcohol/week: 0.0 standard drinks     Comment: social     History   Drug Use No       REVIEW OF SYSTEMS:   Constitutional, neuro, ENT, endocrine, pulmonary, cardiac, gastrointestinal, genitourinary, musculoskeletal, integument  "and psychiatric systems are negative, except as otherwise noted.    EXAM:   /76   Pulse 74   Temp 97.4  F (36.3  C) (Tympanic)   Resp 18   Ht 1.854 m (6' 1\")   Wt 145.2 kg (320 lb)   SpO2 97%   BMI 42.22 kg/m      GENERAL APPEARANCE: healthy, alert and no distress     EYES: EOMI,  PERRL     HENT: ear canals and TM's normal and nose and mouth without ulcers or lesions     NECK: no adenopathy, no asymmetry, masses, or scars and thyroid normal to palpation     RESP: lungs clear to auscultation - no rales, rhonchi or wheezes     CV: regular rates and rhythm, normal S1 S2, no S3 or S4 and no murmur, click or rub     ABDOMEN:  soft, nontender, no HSM or masses and bowel sounds normal     MS: extremities normal- no gross deformities noted, no evidence of inflammation in joints, FROM in all extremities.     SKIN: no suspicious lesions or rashes     NEURO: Normal strength and tone, sensory exam grossly normal, mentation intact and speech normal     PSYCH: mentation appears normal. and affect normal/bright     LYMPHATICS: No cervical adenopathy    DIAGNOSTICS:   EKG: appears normal, NSR, normal axis, normal intervals, no acute ST/T changes c/w ischemia, no LVH by voltage criteria, unchanged from previous tracings    Recent Labs   Lab Test 01/15/20  1345 07/15/19  1500 01/14/19  1520  01/09/18  1603   HGB 14.7  --   --   --  14.5     --   --   --  231     --  139   < > 137   POTASSIUM 4.2  --  4.0   < > 4.6   CR 0.81  --  0.76   < > 0.83   A1C 6.2* 6.2* 6.7*   < > 7.2*    < > = values in this interval not displayed.      Results for orders placed or performed in visit on 02/21/20   UA with Microscopic reflex to Culture     Status: Abnormal   Result Value Ref Range    Color Urine Light Yellow     Appearance Urine Clear     Glucose Urine Negative NEG^Negative mg/dL    Bilirubin Urine Negative NEG^Negative    Ketones Urine 10 (A) NEG^Negative mg/dL    Specific Gravity Urine 1.028 1.003 - 1.035    Blood " Urine Negative NEG^Negative    pH Urine 5.0 4.7 - 8.0 pH    Protein Albumin Urine Negative NEG^Negative mg/dL    Urobilinogen mg/dL Normal 0.0 - 2.0 mg/dL    Nitrite Urine Negative NEG^Negative    Leukocyte Esterase Urine Negative NEG^Negative    Source Midstream Urine     WBC Urine 1 0 - 5 /HPF    RBC Urine 1 0 - 2 /HPF    Bacteria Urine None (A) NEG^Negative /HPF    Mucous Urine Present (A) NEG^Negative /LPF    Hyaline Casts 1 (A) OTO2^O - 2 /LPF   Erythrocyte sedimentation rate auto     Status: None   Result Value Ref Range    Sed Rate 9 0 - 20 mm/h   CBC with platelets differential     Status: None   Result Value Ref Range    WBC 7.4 4.0 - 11.0 10e9/L    RBC Count 4.88 4.4 - 5.9 10e12/L    Hemoglobin 14.4 13.3 - 17.7 g/dL    Hematocrit 41.3 40.0 - 53.0 %    MCV 85 78 - 100 fl    MCH 29.5 26.5 - 33.0 pg    MCHC 34.9 31.5 - 36.5 g/dL    RDW 13.8 10.0 - 15.0 %    Platelet Count 229 150 - 450 10e9/L    Diff Method Automated Method     % Neutrophils 60.3 %    % Lymphocytes 29.8 %    % Monocytes 6.7 %    % Eosinophils 2.4 %    % Basophils 0.5 %    % Immature Granulocytes 0.3 %    Nucleated RBCs 0 0 /100    Absolute Neutrophil 4.4 1.6 - 8.3 10e9/L    Absolute Lymphocytes 2.2 0.8 - 5.3 10e9/L    Absolute Monocytes 0.5 0.0 - 1.3 10e9/L    Absolute Eosinophils 0.2 0.0 - 0.7 10e9/L    Absolute Basophils 0.0 0.0 - 0.2 10e9/L    Abs Immature Granulocytes 0.0 0 - 0.4 10e9/L    Absolute Nucleated RBC 0.0    Basic metabolic panel     Status: Abnormal   Result Value Ref Range    Sodium 137 133 - 144 mmol/L    Potassium 3.9 3.4 - 5.3 mmol/L    Chloride 104 94 - 109 mmol/L    Carbon Dioxide 25 20 - 32 mmol/L    Anion Gap 8 3 - 14 mmol/L    Glucose 104 (H) 70 - 99 mg/dL    Urea Nitrogen 23 7 - 30 mg/dL    Creatinine 0.77 0.66 - 1.25 mg/dL    GFR Estimate >90 >60 mL/min/[1.73_m2]    GFR Estimate If Black >90 >60 mL/min/[1.73_m2]    Calcium 9.1 8.5 - 10.1 mg/dL         IMPRESSION:   Reason for surgery/procedure: Left hip end stage  Osteoarthritis  Diagnosis/reason for consult: Cardiopulmonary clearance    The proposed surgical procedure is considered INTERMEDIATE risk.    REVISED CARDIAC RISK INDEX  The patient has the following serious cardiovascular risks for perioperative complications such as (MI, PE, VFib and 3  AV Block):  No serious cardiac risks  INTERPRETATION: 2 risks: Class III (moderate risk - 6.6% complication rate)    The patient has the following additional risks for perioperative complications:  Morbid obesity      ICD-10-CM    1. Preop general physical exam Z01.818 EKG 12-lead complete w/read - (Clinic Performed)     Basic metabolic panel     CBC with platelets differential     Erythrocyte sedimentation rate auto     UA with Microscopic reflex to Culture     UA with Microscopic reflex to Culture     Erythrocyte sedimentation rate auto     CBC with platelets differential     Basic metabolic panel     EKG 12-lead complete w/read - (Clinic Performed)   2. Primary osteoarthritis of left hip M16.12 EKG 12-lead complete w/read - (Clinic Performed)     Basic metabolic panel     CBC with platelets differential     Erythrocyte sedimentation rate auto     UA with Microscopic reflex to Culture     UA with Microscopic reflex to Culture     Erythrocyte sedimentation rate auto     CBC with platelets differential     Basic metabolic panel     EKG 12-lead complete w/read - (Clinic Performed)   3. Morbid obesity due to excess calories (H) E66.01 EKG 12-lead complete w/read - (Clinic Performed)     Basic metabolic panel     CBC with platelets differential     Erythrocyte sedimentation rate auto     UA with Microscopic reflex to Culture     UA with Microscopic reflex to Culture     Erythrocyte sedimentation rate auto     CBC with platelets differential     Basic metabolic panel     EKG 12-lead complete w/read - (Clinic Performed)   4. Type 2 diabetes mellitus without complication, without long-term current use of insulin (H) E11.9 EKG 12-lead  complete w/read - (Clinic Performed)     Basic metabolic panel     CBC with platelets differential     Erythrocyte sedimentation rate auto     UA with Microscopic reflex to Culture     UA with Microscopic reflex to Culture     Erythrocyte sedimentation rate auto     CBC with platelets differential     Basic metabolic panel     EKG 12-lead complete w/read - (Clinic Performed)   5. Essential hypertension with goal blood pressure less than 140/90 I10 EKG 12-lead complete w/read - (Clinic Performed)     Basic metabolic panel     CBC with platelets differential     Erythrocyte sedimentation rate auto     UA with Microscopic reflex to Culture     UA with Microscopic reflex to Culture     Erythrocyte sedimentation rate auto     CBC with platelets differential     Basic metabolic panel     EKG 12-lead complete w/read - (Clinic Performed)       RECOMMENDATIONS:   Handicap sticker given       --Consult hospital rounder / IM to assist post-op medical management    --Patient is to take all scheduled medications on the day of surgery EXCEPT for modifications listed below.  Hold Hyzaar morning of surgery discussed.     APPROVAL GIVEN to proceed with proposed procedure, without further diagnostic evaluation       Signed Electronically by: Zak Schrader MD    Copy of this evaluation report is provided to requesting physician.    [facility] Preop Guidelines    Revised Cardiac Risk Index

## 2020-02-12 NOTE — PROGRESS NOTES
[facility] [place]  [address]  [place] [address]  [phone]  Dept: [phone]    PRE-OP EVALUATION:  Today's date: 2020    Glen Rogers (: 1965) presents for pre-operative evaluation assessment as requested by Dr. Bryant.  He requires evaluation and anesthesia risk assessment prior to undergoing surgery/procedure for treatment of  Left hip .    Proposed Surgery/ Procedure: DIRECT ANTERIOR LEFT TOTAL HIP ARTHROPLASTY  Date of Surgery/ Procedure: 3/3/2020  Time of Surgery/ Procedure: D  Hospital/Surgical [place] [facility] [place]    Primary Physician: Zak Schrader  Type of Anesthesia Anticipated: Spinal    Patient has a Health Care Directive or Living Will:  NO    1. NO - Do you have a history of heart attack, stroke, stent, bypass or surgery on an artery in the head, neck, heart or legs?  2. NO - Do you ever have any pain or discomfort in your chest?  3. NO - Do you have a history of  Heart Failure?  4. NO - Are you troubled by shortness of breath when: walking on the level, up a slight hill or at night?  5. NO - Do you currently have a cold, bronchitis or other respiratory infection?  6. NO - Do you have a cough, shortness of breath or wheezing?  7. NO - Do you sometimes get pains in the calves of your legs when you walk?  8. YES - DO YOU OR ANYONE IN YOUR FAMILY HAVE PREVIOUS HISTORY OF BLOOD CLOTS? Mom-- had stroke/ hemorrhagic  No blood clots   9. NO - Do you or does anyone in your family have a serious bleeding problem such as prolonged bleeding following surgeries or cuts?  10. NO - Have you ever had problems with anemia or been told to take iron pills?  11. NO - Have you had any abnormal blood loss such as black, tarry or bloody stools, or abnormal vaginal bleeding?  12. NO - Have you ever had a blood transfusion?  13. NO - Have you or any of your relatives ever had problems with anesthesia?  14. YES - DO YOU HAVE SLEEP APNEA, EXCESSIVE SNORING OR DAYTIME DROWSINESS? Yes -  CPAP  15. NO - Do you have any prosthetic heart valves?  16. NO - Do you have prosthetic joints?  17. NO - Is there any chance that you may be pregnant?      HPI:     HPI related to upcoming procedure:   53 yO male  presents for cardiopulmonary/general clearance to undergo the above procedure.  His surgeon listed above has asked for this clearance to undergo anesthesia. Pt has had this condition for approximately over a year .   Overall pt is of good health and has not  had any perioperative complications with previous surgeries.          DIABETES - Patient has a longstanding history of DiabetesType Type II . Patient is being treated with diet, oral agents and exercise and denies significant side effects. Control has been good. Complicating factors include but are not limited to: hypertension, hyperlipidemia and morbid obesity .     HYPERTENSION - Patient has longstanding history of HTN , currently denies any symptoms referable to elevated blood pressure. Specifically denies chest pain, palpitations, dyspnea, orthopnea, PND or peripheral edema. Blood pressure readings have been in normal range. Current medication regimen is as listed below. Patient denies any side effects of medication.       MEDICAL HISTORY:     Patient Active Problem List    Diagnosis Date Noted     Chronic left-sided low back pain with left-sided sciatica 01/15/2020     Priority: Medium     Type 2 diabetes mellitus without complication, without long-term current use of insulin (H) 10/28/2016     Priority: Medium     ACP (advance care planning) 07/11/2016     Priority: Medium     Advance Care Planning 7/11/2016: ACP Review of Chart / Resources Provided:  Reviewed chart for advance care plan.  [place] PACHECO Rogers has been provided information and resources to begin or update their advance care plan.  Added by Noreen Horta             Essential hypertension with goal blood pressure less than 140/90 07/08/2016     Priority: Medium     Pure  hypercholesterolemia 07/08/2016     Priority: Medium     Morbid obesity due to excess calories (H) 04/06/2016     Priority: Medium     FH: colon cancer      Priority: Medium     father        Past Medical History:   Diagnosis Date     Diabetes mellitus, type 2 (H) 3/18/2015     Essential hypertension 7/20/2015     FH: colon cancer     father     HTN (hypertension)     borderline     Obesity      GIULIANA on CPAP      PONV (postoperative nausea and vomiting)      Type 2 diabetes mellitus without complication (H) 7/20/2015     Past Surgical History:   Procedure Laterality Date     COLONOSCOPY N/A 5/22/2015    Repeat in 2020??/Procedure: COLONOSCOPY;  Surgeon: Ousmane Eddy DO;  Location: [facility] OR     ENT SURGERY      tonsillectomy     Current Outpatient Medications   Medication Sig Dispense Refill     atorvastatin (LIPITOR) 10 MG tablet Take 1 tablet (10 mg) by mouth daily 90 tablet 2     blood glucose monitoring (DORIAN CONTOUR NEXT) test strip Use to test blood sugar 3 times daily or as directed. 2 Box 12     blood glucose monitoring (DORIAN MICROLET) lancets Use to test blood sugar 3 times daily or as directed. 1 Box 12     losartan-hydrochlorothiazide (HYZAAR) 50-12.5 MG tablet Take 1 tablet by mouth daily 90 tablet 2     metFORMIN (GLUCOPHAGE-XR) 500 MG 24 hr tablet Take 2 tablets (1,000 mg) by mouth daily (with dinner) 180 tablet 2     OTC products: None, except as noted above    Allergies   Allergen Reactions     Morphine Hives     Zocor [Simvastatin] Muscle Pain (Myalgia)      Latex Allergy: NO    Social History     Tobacco Use     Smoking status: Never Smoker     Smokeless tobacco: Never Used     Tobacco comment: Does have occasional cigar    Substance Use Topics     Alcohol use: Yes     Alcohol/week: 0.0 standard drinks     Comment: social     History   Drug Use No       REVIEW OF SYSTEMS:   Constitutional, neuro, ENT, endocrine, pulmonary, cardiac, gastrointestinal, genitourinary, musculoskeletal, integument  "and psychiatric systems are negative, except as otherwise noted.    EXAM:   /76   Pulse 74   Temp 97.4  F (36.3  C) (Tympanic)   Resp 18   Ht 1.854 m (6' 1\")   Wt 145.2 kg (320 lb)   SpO2 97%   BMI 42.22 kg/m      GENERAL APPEARANCE: healthy, alert and no distress     EYES: EOMI,  PERRL     HENT: ear canals and TM's normal and nose and mouth without ulcers or lesions     NECK: no adenopathy, no asymmetry, masses, or scars and thyroid normal to palpation     RESP: lungs clear to auscultation - no rales, rhonchi or wheezes     CV: regular rates and rhythm, normal S1 S2, no S3 or S4 and no murmur, click or rub     ABDOMEN:  soft, nontender, no HSM or masses and bowel sounds normal     MS: extremities normal- no gross deformities noted, no evidence of inflammation in joints, FROM in all extremities.     SKIN: no suspicious lesions or rashes     NEURO: Normal strength and tone, sensory exam grossly normal, mentation intact and speech normal     PSYCH: mentation appears normal. and affect normal/bright     LYMPHATICS: No cervical adenopathy    DIAGNOSTICS:   EKG: appears normal, NSR, normal axis, normal intervals, no acute ST/T changes c/w ischemia, no LVH by voltage criteria, unchanged from previous tracings    Recent Labs   Lab Test 01/15/20  1345 07/15/19  1500 01/14/19  1520  01/09/18  1603   HGB 14.7  --   --   --  14.5     --   --   --  231     --  139   < > 137   POTASSIUM 4.2  --  4.0   < > 4.6   CR 0.81  --  0.76   < > 0.83   A1C 6.2* 6.2* 6.7*   < > 7.2*    < > = values in this interval not displayed.      Results for orders placed or performed in visit on 02/21/20   UA with Microscopic reflex to Culture     Status: Abnormal   Result Value Ref Range    Color Urine Light Yellow     Appearance Urine Clear     Glucose Urine Negative NEG^Negative mg/dL    Bilirubin Urine Negative NEG^Negative    Ketones Urine 10 (A) NEG^Negative mg/dL    Specific Gravity Urine 1.028 1.003 - 1.035    Blood " Urine Negative NEG^Negative    pH Urine 5.0 4.7 - 8.0 pH    Protein Albumin Urine Negative NEG^Negative mg/dL    Urobilinogen mg/dL Normal 0.0 - 2.0 mg/dL    Nitrite Urine Negative NEG^Negative    Leukocyte Esterase Urine Negative NEG^Negative    Source Midstream Urine     WBC Urine 1 0 - 5 /HPF    RBC Urine 1 0 - 2 /HPF    Bacteria Urine None (A) NEG^Negative /HPF    Mucous Urine Present (A) NEG^Negative /LPF    Hyaline Casts 1 (A) OTO2^O - 2 /LPF   Erythrocyte sedimentation rate auto     Status: None   Result Value Ref Range    Sed Rate 9 0 - 20 mm/h   CBC with platelets differential     Status: None   Result Value Ref Range    WBC 7.4 4.0 - 11.0 10e9/L    RBC Count 4.88 4.4 - 5.9 10e12/L    Hemoglobin 14.4 13.3 - 17.7 g/dL    Hematocrit 41.3 40.0 - 53.0 %    MCV 85 78 - 100 fl    MCH 29.5 26.5 - 33.0 pg    MCHC 34.9 31.5 - 36.5 g/dL    RDW 13.8 10.0 - 15.0 %    Platelet Count 229 150 - 450 10e9/L    Diff Method Automated Method     % Neutrophils 60.3 %    % Lymphocytes 29.8 %    % Monocytes 6.7 %    % Eosinophils 2.4 %    % Basophils 0.5 %    % Immature Granulocytes 0.3 %    Nucleated RBCs 0 0 /100    Absolute Neutrophil 4.4 1.6 - 8.3 10e9/L    Absolute Lymphocytes 2.2 0.8 - 5.3 10e9/L    Absolute Monocytes 0.5 0.0 - 1.3 10e9/L    Absolute Eosinophils 0.2 0.0 - 0.7 10e9/L    Absolute Basophils 0.0 0.0 - 0.2 10e9/L    Abs Immature Granulocytes 0.0 0 - 0.4 10e9/L    Absolute Nucleated RBC 0.0    Basic metabolic panel     Status: Abnormal   Result Value Ref Range    Sodium 137 133 - 144 mmol/L    Potassium 3.9 3.4 - 5.3 mmol/L    Chloride 104 94 - 109 mmol/L    Carbon Dioxide 25 20 - 32 mmol/L    Anion Gap 8 3 - 14 mmol/L    Glucose 104 (H) 70 - 99 mg/dL    Urea Nitrogen 23 7 - 30 mg/dL    Creatinine 0.77 0.66 - 1.25 mg/dL    GFR Estimate >90 >60 mL/min/[1.73_m2]    GFR Estimate If Black >90 >60 mL/min/[1.73_m2]    Calcium 9.1 8.5 - 10.1 mg/dL         IMPRESSION:   Reason for surgery/procedure: Left hip end stage  Osteoarthritis  Diagnosis/reason for consult: Cardiopulmonary clearance    The proposed surgical procedure is considered INTERMEDIATE risk.    REVISED CARDIAC RISK INDEX  The patient has the following serious cardiovascular risks for perioperative complications such as (MI, PE, VFib and 3  AV Block):  No serious cardiac risks  INTERPRETATION: 2 risks: Class III (moderate risk - 6.6% complication rate)    The patient has the following additional risks for perioperative complications:  Morbid obesity      ICD-10-CM    1. Preop general physical exam Z01.818 EKG 12-lead complete w/read - (Clinic Performed)     Basic metabolic panel     CBC with platelets differential     Erythrocyte sedimentation rate auto     UA with Microscopic reflex to Culture     UA with Microscopic reflex to Culture     Erythrocyte sedimentation rate auto     CBC with platelets differential     Basic metabolic panel     EKG 12-lead complete w/read - (Clinic Performed)   2. Primary osteoarthritis of left hip M16.12 EKG 12-lead complete w/read - (Clinic Performed)     Basic metabolic panel     CBC with platelets differential     Erythrocyte sedimentation rate auto     UA with Microscopic reflex to Culture     UA with Microscopic reflex to Culture     Erythrocyte sedimentation rate auto     CBC with platelets differential     Basic metabolic panel     EKG 12-lead complete w/read - (Clinic Performed)   3. Morbid obesity due to excess calories (H) E66.01 EKG 12-lead complete w/read - (Clinic Performed)     Basic metabolic panel     CBC with platelets differential     Erythrocyte sedimentation rate auto     UA with Microscopic reflex to Culture     UA with Microscopic reflex to Culture     Erythrocyte sedimentation rate auto     CBC with platelets differential     Basic metabolic panel     EKG 12-lead complete w/read - (Clinic Performed)   4. Type 2 diabetes mellitus without complication, without long-term current use of insulin (H) E11.9 EKG 12-lead  complete w/read - (Clinic Performed)     Basic metabolic panel     CBC with platelets differential     Erythrocyte sedimentation rate auto     UA with Microscopic reflex to Culture     UA with Microscopic reflex to Culture     Erythrocyte sedimentation rate auto     CBC with platelets differential     Basic metabolic panel     EKG 12-lead complete w/read - (Clinic Performed)   5. Essential hypertension with goal blood pressure less than 140/90 I10 EKG 12-lead complete w/read - (Clinic Performed)     Basic metabolic panel     CBC with platelets differential     Erythrocyte sedimentation rate auto     UA with Microscopic reflex to Culture     UA with Microscopic reflex to Culture     Erythrocyte sedimentation rate auto     CBC with platelets differential     Basic metabolic panel     EKG 12-lead complete w/read - (Clinic Performed)       RECOMMENDATIONS:   Handicap sticker given       --Consult hospital rounder / IM to assist post-op medical management    --Patient is to take all scheduled medications on the day of surgery EXCEPT for modifications listed below.  Hold Hyzaar morning of surgery discussed.     APPROVAL GIVEN to proceed with proposed procedure, without further diagnostic evaluation       Signed Electronically by: Zak Schrader MD    Copy of this evaluation report is provided to requesting physician.    [facility] Preop Guidelines    Revised Cardiac Risk Index

## 2020-02-12 NOTE — PATIENT INSTRUCTIONS
Before Your Surgery      Call your surgeon if there is any change in your health. This includes signs of a cold or flu (such as a sore throat, runny nose, cough, rash or fever).    Do not smoke, drink alcohol or take over the counter medicine (unless your surgeon or primary care doctor tells you to) for the 24 hours before and after surgery.    If you take prescribed drugs: Follow your doctor s orders about which medicines to take and which to stop until after surgery.    Eating and drinking prior to surgery: follow the instructions from your surgeon    Take a shower or bath the night before surgery. Use the soap your surgeon gave you to gently clean your skin. If you do not have soap from your surgeon, use your regular soap. Do not shave or scrub the surgery site.  Wear clean pajamas and have clean sheets on your bed.     Results for orders placed or performed in visit on 02/21/20   UA with Microscopic reflex to Culture     Status: Abnormal   Result Value Ref Range    Color Urine Light Yellow     Appearance Urine Clear     Glucose Urine Negative NEG^Negative mg/dL    Bilirubin Urine Negative NEG^Negative    Ketones Urine 10 (A) NEG^Negative mg/dL    Specific Gravity Urine 1.028 1.003 - 1.035    Blood Urine Negative NEG^Negative    pH Urine 5.0 4.7 - 8.0 pH    Protein Albumin Urine Negative NEG^Negative mg/dL    Urobilinogen mg/dL Normal 0.0 - 2.0 mg/dL    Nitrite Urine Negative NEG^Negative    Leukocyte Esterase Urine Negative NEG^Negative    Source Midstream Urine     WBC Urine 1 0 - 5 /HPF    RBC Urine 1 0 - 2 /HPF    Bacteria Urine None (A) NEG^Negative /HPF    Mucous Urine Present (A) NEG^Negative /LPF    Hyaline Casts 1 (A) OTO2^O - 2 /LPF   Erythrocyte sedimentation rate auto     Status: None   Result Value Ref Range    Sed Rate 9 0 - 20 mm/h   CBC with platelets differential     Status: None   Result Value Ref Range    WBC 7.4 4.0 - 11.0 10e9/L    RBC Count 4.88 4.4 - 5.9 10e12/L    Hemoglobin 14.4 13.3 -  17.7 g/dL    Hematocrit 41.3 40.0 - 53.0 %    MCV 85 78 - 100 fl    MCH 29.5 26.5 - 33.0 pg    MCHC 34.9 31.5 - 36.5 g/dL    RDW 13.8 10.0 - 15.0 %    Platelet Count 229 150 - 450 10e9/L    Diff Method Automated Method     % Neutrophils 60.3 %    % Lymphocytes 29.8 %    % Monocytes 6.7 %    % Eosinophils 2.4 %    % Basophils 0.5 %    % Immature Granulocytes 0.3 %    Nucleated RBCs 0 0 /100    Absolute Neutrophil 4.4 1.6 - 8.3 10e9/L    Absolute Lymphocytes 2.2 0.8 - 5.3 10e9/L    Absolute Monocytes 0.5 0.0 - 1.3 10e9/L    Absolute Eosinophils 0.2 0.0 - 0.7 10e9/L    Absolute Basophils 0.0 0.0 - 0.2 10e9/L    Abs Immature Granulocytes 0.0 0 - 0.4 10e9/L    Absolute Nucleated RBC 0.0    Basic metabolic panel     Status: Abnormal   Result Value Ref Range    Sodium 137 133 - 144 mmol/L    Potassium 3.9 3.4 - 5.3 mmol/L    Chloride 104 94 - 109 mmol/L    Carbon Dioxide 25 20 - 32 mmol/L    Anion Gap 8 3 - 14 mmol/L    Glucose 104 (H) 70 - 99 mg/dL    Urea Nitrogen 23 7 - 30 mg/dL    Creatinine 0.77 0.66 - 1.25 mg/dL    GFR Estimate >90 >60 mL/min/[1.73_m2]    GFR Estimate If Black >90 >60 mL/min/[1.73_m2]    Calcium 9.1 8.5 - 10.1 mg/dL         HOLD HYZAAR THE MORNING OF SURGERY

## 2020-02-19 ENCOUNTER — HOSPITAL ENCOUNTER (OUTPATIENT)
Dept: PHYSICAL THERAPY | Facility: HOSPITAL | Age: 55
Setting detail: THERAPIES SERIES
End: 2020-02-19
Attending: FAMILY MEDICINE
Payer: COMMERCIAL

## 2020-02-19 PROCEDURE — 97110 THERAPEUTIC EXERCISES: CPT | Mod: GP

## 2020-02-19 NOTE — PROGRESS NOTES
Outpatient Physical Therapy Discharge Note     Patient: Glen Rogers  : 1965    Beginning/End Dates of Reporting Period:  20 to 2020    Referring Provider: Dr. Schrader    Therapy Diagnosis: L hip OA with associated L sided low back pain     Client Self Report: Patient reports today for L hip rehab. States that he puts pain level at 3-4/10. Feels comfortable with today being last day of therapy.    Objective Measurements:  Limited passive hip flexion, IR and ER of L hip    Goals:  Short-term goals:  To be achieved in 2-3 weeks:     Instruct in home program  1.) Patient will be independent with a short-term home exercise program. MET  2.) Patient will understand biomechanical stressors of the lumbar spine in order to make modifications to activities to reduce further risk of injury. MET  3.) Patient will demonstrate proper body/lifting mechanics with abdominal bracing without cueing.  4.) Patient will report a 25% or greater improvement in symptoms in order to allow for increased comfort with activities of daily living. NOT MET        Long-term goals:  To be achieved in 6-8 weeks:     Self management of symptoms  Pain free activities of daily living  Return to previous level of function  1.) Improve score on Oswestry Disability Index by 50% to correlate with clinically significant change.  2.) Patient will report a 50% or greater improvement in symptoms in order to allow for increased comfort with activities of daily living     NO LT goals MET    Assessment:  Patient is currently independent with use of HEP for effective strengthening and stabilization of L hip prior to surgery on 3/3/20. Patient to continue with use of HEP for further strengthening and self management of pain. Patient should respond well to hip replacement and have a good recovery.    Plan:  Discharge from therapy.    Discharge:    Reason for Discharge: Patient chooses to discontinue therapy.    Equipment Issued:  none    Discharge Plan: Patient to continue home program.

## 2020-02-21 ENCOUNTER — APPOINTMENT (OUTPATIENT)
Dept: LAB | Facility: OTHER | Age: 55
End: 2020-02-21
Attending: FAMILY MEDICINE
Payer: COMMERCIAL

## 2020-02-21 ENCOUNTER — OFFICE VISIT (OUTPATIENT)
Dept: FAMILY MEDICINE | Facility: OTHER | Age: 55
End: 2020-02-21
Attending: FAMILY MEDICINE
Payer: COMMERCIAL

## 2020-02-21 VITALS
WEIGHT: 315 LBS | OXYGEN SATURATION: 97 % | BODY MASS INDEX: 41.75 KG/M2 | TEMPERATURE: 97.4 F | RESPIRATION RATE: 18 BRPM | DIASTOLIC BLOOD PRESSURE: 76 MMHG | HEART RATE: 74 BPM | SYSTOLIC BLOOD PRESSURE: 118 MMHG | HEIGHT: 73 IN

## 2020-02-21 DIAGNOSIS — E66.01 MORBID OBESITY DUE TO EXCESS CALORIES (H): ICD-10-CM

## 2020-02-21 DIAGNOSIS — I10 ESSENTIAL HYPERTENSION WITH GOAL BLOOD PRESSURE LESS THAN 140/90: ICD-10-CM

## 2020-02-21 DIAGNOSIS — Z01.818 PREOP GENERAL PHYSICAL EXAM: Primary | ICD-10-CM

## 2020-02-21 DIAGNOSIS — M16.12 PRIMARY OSTEOARTHRITIS OF LEFT HIP: ICD-10-CM

## 2020-02-21 DIAGNOSIS — E11.9 TYPE 2 DIABETES MELLITUS WITHOUT COMPLICATION, WITHOUT LONG-TERM CURRENT USE OF INSULIN (H): ICD-10-CM

## 2020-02-21 LAB
ALBUMIN UR-MCNC: NEGATIVE MG/DL
ANION GAP SERPL CALCULATED.3IONS-SCNC: 8 MMOL/L (ref 3–14)
APPEARANCE UR: CLEAR
BACTERIA #/AREA URNS HPF: ABNORMAL /HPF
BASOPHILS # BLD AUTO: 0 10E9/L (ref 0–0.2)
BASOPHILS NFR BLD AUTO: 0.5 %
BILIRUB UR QL STRIP: NEGATIVE
BUN SERPL-MCNC: 23 MG/DL (ref 7–30)
CALCIUM SERPL-MCNC: 9.1 MG/DL (ref 8.5–10.1)
CHLORIDE SERPL-SCNC: 104 MMOL/L (ref 94–109)
CO2 SERPL-SCNC: 25 MMOL/L (ref 20–32)
COLOR UR AUTO: ABNORMAL
CREAT SERPL-MCNC: 0.77 MG/DL (ref 0.66–1.25)
DIFFERENTIAL METHOD BLD: NORMAL
EOSINOPHIL # BLD AUTO: 0.2 10E9/L (ref 0–0.7)
EOSINOPHIL NFR BLD AUTO: 2.4 %
ERYTHROCYTE [DISTWIDTH] IN BLOOD BY AUTOMATED COUNT: 13.8 % (ref 10–15)
ERYTHROCYTE [SEDIMENTATION RATE] IN BLOOD BY WESTERGREN METHOD: 9 MM/H (ref 0–20)
GFR SERPL CREATININE-BSD FRML MDRD: >90 ML/MIN/{1.73_M2}
GLUCOSE SERPL-MCNC: 104 MG/DL (ref 70–99)
GLUCOSE UR STRIP-MCNC: NEGATIVE MG/DL
HCT VFR BLD AUTO: 41.3 % (ref 40–53)
HGB BLD-MCNC: 14.4 G/DL (ref 13.3–17.7)
HGB UR QL STRIP: NEGATIVE
HYALINE CASTS #/AREA URNS LPF: 1 /LPF
IMM GRANULOCYTES # BLD: 0 10E9/L (ref 0–0.4)
IMM GRANULOCYTES NFR BLD: 0.3 %
KETONES UR STRIP-MCNC: 10 MG/DL
LEUKOCYTE ESTERASE UR QL STRIP: NEGATIVE
LYMPHOCYTES # BLD AUTO: 2.2 10E9/L (ref 0.8–5.3)
LYMPHOCYTES NFR BLD AUTO: 29.8 %
MCH RBC QN AUTO: 29.5 PG (ref 26.5–33)
MCHC RBC AUTO-ENTMCNC: 34.9 G/DL (ref 31.5–36.5)
MCV RBC AUTO: 85 FL (ref 78–100)
MONOCYTES # BLD AUTO: 0.5 10E9/L (ref 0–1.3)
MONOCYTES NFR BLD AUTO: 6.7 %
MUCOUS THREADS #/AREA URNS LPF: PRESENT /LPF
NEUTROPHILS # BLD AUTO: 4.4 10E9/L (ref 1.6–8.3)
NEUTROPHILS NFR BLD AUTO: 60.3 %
NITRATE UR QL: NEGATIVE
NRBC # BLD AUTO: 0 10*3/UL
NRBC BLD AUTO-RTO: 0 /100
PH UR STRIP: 5 PH (ref 4.7–8)
PLATELET # BLD AUTO: 229 10E9/L (ref 150–450)
POTASSIUM SERPL-SCNC: 3.9 MMOL/L (ref 3.4–5.3)
RBC # BLD AUTO: 4.88 10E12/L (ref 4.4–5.9)
RBC #/AREA URNS AUTO: 1 /HPF (ref 0–2)
SODIUM SERPL-SCNC: 137 MMOL/L (ref 133–144)
SOURCE: ABNORMAL
SP GR UR STRIP: 1.03 (ref 1–1.03)
UROBILINOGEN UR STRIP-MCNC: NORMAL MG/DL (ref 0–2)
WBC # BLD AUTO: 7.4 10E9/L (ref 4–11)
WBC #/AREA URNS AUTO: 1 /HPF (ref 0–5)

## 2020-02-21 PROCEDURE — 81001 URINALYSIS AUTO W/SCOPE: CPT | Performed by: FAMILY MEDICINE

## 2020-02-21 PROCEDURE — 80048 BASIC METABOLIC PNL TOTAL CA: CPT | Performed by: FAMILY MEDICINE

## 2020-02-21 PROCEDURE — 36415 COLL VENOUS BLD VENIPUNCTURE: CPT | Performed by: FAMILY MEDICINE

## 2020-02-21 PROCEDURE — 99214 OFFICE O/P EST MOD 30 MIN: CPT | Performed by: FAMILY MEDICINE

## 2020-02-21 PROCEDURE — 85652 RBC SED RATE AUTOMATED: CPT | Performed by: FAMILY MEDICINE

## 2020-02-21 PROCEDURE — 85025 COMPLETE CBC W/AUTO DIFF WBC: CPT | Performed by: FAMILY MEDICINE

## 2020-02-21 PROCEDURE — 93000 ELECTROCARDIOGRAM COMPLETE: CPT | Performed by: INTERNAL MEDICINE

## 2020-02-21 ASSESSMENT — PAIN SCALES - GENERAL: PAINLEVEL: MILD PAIN (3)

## 2020-02-21 ASSESSMENT — MIFFLIN-ST. JEOR: SCORE: 2340.39

## 2020-02-21 NOTE — NURSING NOTE
"Chief Complaint   Patient presents with     Pre-Op Exam       Initial BP (!) 118/7   Pulse 74   Temp 97.4  F (36.3  C) (Tympanic)   Resp 18   Ht 1.854 m (6' 1\")   Wt 145.2 kg (320 lb)   SpO2 97%   BMI 42.22 kg/m   Estimated body mass index is 42.22 kg/m  as calculated from the following:    Height as of this encounter: 1.854 m (6' 1\").    Weight as of this encounter: 145.2 kg (320 lb).  Medication Reconciliation: complete  Roberta Richardson LPN  "

## 2020-02-25 ENCOUNTER — ANESTHESIA EVENT (OUTPATIENT)
Dept: SURGERY | Facility: HOSPITAL | Age: 55
End: 2020-02-25
Payer: COMMERCIAL

## 2020-02-25 ASSESSMENT — LIFESTYLE VARIABLES: TOBACCO_USE: 1

## 2020-02-25 NOTE — ANESTHESIA PREPROCEDURE EVALUATION
Anesthesia Pre-Procedure Evaluation    Patient: Glen Rogers   MRN: [id number] : 1965          Preoperative Diagnosis: Osteoarthritis of left hip, unspecified osteoarthritis type [M16.12]    Procedure(s):  DIRECT ANTERIOR LEFT TOTAL HIP ARTHROPLASTY    Past Medical History:   Diagnosis Date     Diabetes mellitus, type 2 (H) 3/18/2015     Essential hypertension 2015     FH: colon cancer     father     HTN (hypertension)     borderline     Obesity      GIULIANA on CPAP      PONV (postoperative nausea and vomiting)      Type 2 diabetes mellitus without complication (H) 2015     Past Surgical History:   Procedure Laterality Date     COLONOSCOPY N/A 2015    Repeat in ??/Procedure: COLONOSCOPY;  Surgeon: Ousmane Eddy DO;  Location: [facility] OR     ENT SURGERY      tonsillectomy       Anesthesia Evaluation     . Pt has had prior anesthetic. Type: General and MAC    History of anesthetic complications   - difficult intubation and PONV  3/3/20 very anterior with [place] 4, small mouth opening, difficutl airway.  ablle to place LMA with no issues      ROS/MED HX    ENT/Pulmonary:     (+)sleep apnea, tobacco use, Current use Cigar packs/day  uses CPAP , . .   GIULIANA Risk Factors: BMI: 48.8.   Neurologic:  - neg neurologic ROS     Cardiovascular:     (+) Dyslipidemia, hypertension-range: not on beta blocker , ---. : . . . :. . Previous cardiac testing date:results:date: results:ECG reviewed date:20 results:nsr date: results:          METS/Exercise Tolerance:  >4 METS   Hematologic:         Musculoskeletal:   (+) arthritis,  -       GI/Hepatic:     (+) bowel prep,       Renal/Genitourinary:  - ROS Renal section negative       Endo:     (+) type II DM Last HgA1c: 6.2 date: 1/15/20 Obesity, .      Psychiatric:  - neg psychiatric ROS       Infectious Disease:  - neg infectious disease ROS       Malignancy:      - no malignancy   Other:    - neg other ROS                      Physical Exam  Normal  "systems: cardiovascular, pulmonary and dental    Airway   Mallampati: II  TM distance: >3 FB  Neck ROM: full    Dental     Cardiovascular   Rhythm and rate: regular and normal      Pulmonary    breath sounds clear to auscultation            Lab Results   Component Value Date    WBC 7.4 02/21/2020    HGB 14.4 02/21/2020    HCT 41.3 02/21/2020     02/21/2020    SED 9 02/21/2020     02/21/2020    POTASSIUM 3.9 02/21/2020    CHLORIDE 104 02/21/2020    CO2 25 02/21/2020    BUN 23 02/21/2020    CR 0.77 02/21/2020     (H) 02/21/2020    JARROD 9.1 02/21/2020    ALBUMIN 4.1 01/15/2020    PROTTOTAL 7.8 01/15/2020    ALT 46 01/15/2020    AST 16 01/15/2020    ALKPHOS 83 01/15/2020    BILITOTAL 0.3 01/15/2020    TSH 0.76 01/14/2019       Preop Vitals  BP Readings from Last 3 Encounters:   02/21/20 118/76   01/15/20 122/78   07/15/19 118/72    Pulse Readings from Last 3 Encounters:   02/21/20 74   01/15/20 84   07/15/19 74      Resp Readings from Last 3 Encounters:   02/21/20 18   01/09/18 16   01/31/17 17    SpO2 Readings from Last 3 Encounters:   02/21/20 97%   01/15/20 96%   07/15/19 98%      Temp Readings from Last 1 Encounters:   02/21/20 97.4  F (36.3  C) (Tympanic)    Ht Readings from Last 1 Encounters:   02/21/20 1.854 m (6' 1\")      Wt Readings from Last 1 Encounters:   02/21/20 145.2 kg (320 lb)    Estimated body mass index is 42.22 kg/m  as calculated from the following:    Height as of 2/21/20: 1.854 m (6' 1\").    Weight as of 2/21/20: 145.2 kg (320 lb).       Anesthesia Plan      History & Physical Review  History and physical reviewed and following examination; no interval change.    ASA Status:  3 .    NPO Status:  > 8 hours    Plan for Spinal, Peripheral Nerve Block and For Post-op pain in coordination with surgeon with Other induction.   PONV prophylaxis:  Ondansetron (or other 5HT-3)       Postoperative Care  Postoperative pain management:  Peripheral nerve block (Single Shot) and Neuraxial " analgesia.      Consents                 Ousmane Garcia, APRN CRNA

## 2020-02-26 NOTE — OR NURSING
Email sent to total joint replacement team at [facility] as follows;    We have Glen Rogers : 63 coming 2020 for LTHA with Dr. Bryant, PCP Dr. Schrader.  Glen was not able to attend a total joint replacement class and did not complete one online.  Education provided via phone call.  Glen plans on going to his home in [place] after hospitalization with assistance from his wife.  She works from home and will be available  and his son lives nearby and can assist with transportation.  Glen has  a split level home with 2 steps to enter from garage and then five steps to enter main level with bedroom/bathroom.  He does not have a walker, has a higher toilet and grab bars to help off toilet, no grab bars in step in tub/shower.  He also has three aging dogs who are always at his feet.  Discussed tripping hazards with dogs and scatter rugs.  He verbalized good understanding.    Education provided on  Call don t Fall , Capnography monitoring, CPAP use and to bring with him, Pain management, and Signs & Symptoms of Infection to watch/report and prevent.  Glen verbalized good understanding of all topics discussed.    Thank you,  Essie Mobley R.N.  Pre-Anesthesia Testing Surgical Education  [facility]

## 2020-03-03 ENCOUNTER — APPOINTMENT (OUTPATIENT)
Dept: GENERAL RADIOLOGY | Facility: HOSPITAL | Age: 55
End: 2020-03-03
Attending: ORTHOPAEDIC SURGERY
Payer: COMMERCIAL

## 2020-03-03 ENCOUNTER — ANESTHESIA (OUTPATIENT)
Dept: SURGERY | Facility: HOSPITAL | Age: 55
End: 2020-03-03
Payer: COMMERCIAL

## 2020-03-03 ENCOUNTER — APPOINTMENT (OUTPATIENT)
Dept: PHYSICAL THERAPY | Facility: HOSPITAL | Age: 55
End: 2020-03-03
Attending: ORTHOPAEDIC SURGERY
Payer: COMMERCIAL

## 2020-03-03 ENCOUNTER — HOSPITAL ENCOUNTER (INPATIENT)
Facility: HOSPITAL | Age: 55
LOS: 2 days | Discharge: HOME | End: 2020-03-05
Attending: ORTHOPAEDIC SURGERY | Admitting: ORTHOPAEDIC SURGERY
Payer: COMMERCIAL

## 2020-03-03 DIAGNOSIS — Z96.642 STATUS POST LEFT HIP REPLACEMENT: Primary | ICD-10-CM

## 2020-03-03 LAB
GLUCOSE BLDC GLUCOMTR-MCNC: 174 MG/DL (ref 70–99)
GLUCOSE BLDC GLUCOMTR-MCNC: 206 MG/DL (ref 70–99)

## 2020-03-03 PROCEDURE — 36000065 ZZH SURGERY LEVEL 4 W FLUORO 1ST 30 MIN: Performed by: ORTHOPAEDIC SURGERY

## 2020-03-03 PROCEDURE — 97161 PT EVAL LOW COMPLEX 20 MIN: CPT | Mod: GP | Performed by: PHYSICAL THERAPIST

## 2020-03-03 PROCEDURE — 25000128 H RX IP 250 OP 636: Performed by: ORTHOPAEDIC SURGERY

## 2020-03-03 PROCEDURE — 40000277 XR SURGERY CARM FLUORO LESS THAN 5 MIN W STILLS: Mod: TC

## 2020-03-03 PROCEDURE — 27110028 ZZH OR GENERAL SUPPLY NON-STERILE: Performed by: ORTHOPAEDIC SURGERY

## 2020-03-03 PROCEDURE — 25800030 ZZH RX IP 258 OP 636: Performed by: NURSE ANESTHETIST, CERTIFIED REGISTERED

## 2020-03-03 PROCEDURE — 25800030 ZZH RX IP 258 OP 636: Performed by: ORTHOPAEDIC SURGERY

## 2020-03-03 PROCEDURE — 71000014 ZZH RECOVERY PHASE 1 LEVEL 2 FIRST HR: Performed by: ORTHOPAEDIC SURGERY

## 2020-03-03 PROCEDURE — 88300 SURGICAL PATH GROSS: CPT | Mod: TC | Performed by: ORTHOPAEDIC SURGERY

## 2020-03-03 PROCEDURE — 40000985 XR PELVIS PORT 1/2 VW: Mod: TC

## 2020-03-03 PROCEDURE — 0SRB06Z REPLACEMENT OF LEFT HIP JOINT WITH OXIDIZED ZIRCONIUM ON POLYETHYLENE SYNTHETIC SUBSTITUTE, OPEN APPROACH: ICD-10-PCS | Performed by: ORTHOPAEDIC SURGERY

## 2020-03-03 PROCEDURE — 27130 TOTAL HIP ARTHROPLASTY: CPT | Performed by: NURSE ANESTHETIST, CERTIFIED REGISTERED

## 2020-03-03 PROCEDURE — 25000128 H RX IP 250 OP 636: Performed by: NURSE ANESTHETIST, CERTIFIED REGISTERED

## 2020-03-03 PROCEDURE — 12000000 ZZH R&B MED SURG/OB

## 2020-03-03 PROCEDURE — 40000275 ZZH STATISTIC RCP TIME EA 10 MIN

## 2020-03-03 PROCEDURE — 37000009 ZZH ANESTHESIA TECHNICAL FEE, EACH ADDTL 15 MIN: Performed by: ORTHOPAEDIC SURGERY

## 2020-03-03 PROCEDURE — 37000008 ZZH ANESTHESIA TECHNICAL FEE, 1ST 30 MIN: Performed by: ORTHOPAEDIC SURGERY

## 2020-03-03 PROCEDURE — 25000125 ZZHC RX 250: Performed by: NURSE ANESTHETIST, CERTIFIED REGISTERED

## 2020-03-03 PROCEDURE — C1776 JOINT DEVICE (IMPLANTABLE): HCPCS | Performed by: ORTHOPAEDIC SURGERY

## 2020-03-03 PROCEDURE — 25000128 H RX IP 250 OP 636: Performed by: INTERNAL MEDICINE

## 2020-03-03 PROCEDURE — 99221 1ST HOSP IP/OBS SF/LOW 40: CPT | Mod: 25 | Performed by: INTERNAL MEDICINE

## 2020-03-03 PROCEDURE — 25000132 ZZH RX MED GY IP 250 OP 250 PS 637: Performed by: ORTHOPAEDIC SURGERY

## 2020-03-03 PROCEDURE — 25000125 ZZHC RX 250: Performed by: ORTHOPAEDIC SURGERY

## 2020-03-03 PROCEDURE — 27210794 ZZH OR GENERAL SUPPLY STERILE: Performed by: ORTHOPAEDIC SURGERY

## 2020-03-03 PROCEDURE — 40000305 ZZH STATISTIC PRE PROC ASSESS I: Performed by: ORTHOPAEDIC SURGERY

## 2020-03-03 PROCEDURE — 36000063 ZZH SURGERY LEVEL 4 EA 15 ADDTL MIN: Performed by: ORTHOPAEDIC SURGERY

## 2020-03-03 PROCEDURE — 82962 GLUCOSE BLOOD TEST: CPT

## 2020-03-03 PROCEDURE — C9290 INJ, BUPIVACAINE LIPOSOME: HCPCS | Performed by: ORTHOPAEDIC SURGERY

## 2020-03-03 DEVICE — IMPLANTABLE DEVICE: Type: IMPLANTABLE DEVICE | Site: HIP | Status: FUNCTIONAL

## 2020-03-03 DEVICE — FEMORAL HEAD-OXINIUM 12/14 36MM +0: Type: IMPLANTABLE DEVICE | Site: HIP | Status: FUNCTIONAL

## 2020-03-03 RX ORDER — LIDOCAINE 40 MG/G
CREAM TOPICAL
Status: DISCONTINUED | OUTPATIENT
Start: 2020-03-03 | End: 2020-03-05 | Stop reason: HOSPADM

## 2020-03-03 RX ORDER — BUPIVACAINE HYDROCHLORIDE AND EPINEPHRINE 2.5; 5 MG/ML; UG/ML
INJECTION, SOLUTION INFILTRATION; PERINEURAL PRN
Status: DISCONTINUED | OUTPATIENT
Start: 2020-03-03 | End: 2020-03-03 | Stop reason: HOSPADM

## 2020-03-03 RX ORDER — HYDROMORPHONE HYDROCHLORIDE 1 MG/ML
0.2 INJECTION, SOLUTION INTRAMUSCULAR; INTRAVENOUS; SUBCUTANEOUS
Status: DISCONTINUED | OUTPATIENT
Start: 2020-03-03 | End: 2020-03-05 | Stop reason: HOSPADM

## 2020-03-03 RX ORDER — NALOXONE HYDROCHLORIDE 0.4 MG/ML
.1-.4 INJECTION, SOLUTION INTRAMUSCULAR; INTRAVENOUS; SUBCUTANEOUS
Status: DISCONTINUED | OUTPATIENT
Start: 2020-03-03 | End: 2020-03-03

## 2020-03-03 RX ORDER — ONDANSETRON 4 MG/1
4 TABLET, ORALLY DISINTEGRATING ORAL EVERY 30 MIN PRN
Status: DISCONTINUED | OUTPATIENT
Start: 2020-03-03 | End: 2020-03-03 | Stop reason: HOSPADM

## 2020-03-03 RX ORDER — ACETAMINOPHEN 325 MG/1
650 TABLET ORAL EVERY 4 HOURS PRN
Status: DISCONTINUED | OUTPATIENT
Start: 2020-03-06 | End: 2020-03-05 | Stop reason: HOSPADM

## 2020-03-03 RX ORDER — LOSARTAN POTASSIUM 50 MG/1
50 TABLET ORAL DAILY
Status: DISCONTINUED | OUTPATIENT
Start: 2020-03-03 | End: 2020-03-05 | Stop reason: HOSPADM

## 2020-03-03 RX ORDER — SODIUM CHLORIDE 9 MG/ML
INJECTION, SOLUTION INTRAVENOUS CONTINUOUS
Status: DISCONTINUED | OUTPATIENT
Start: 2020-03-03 | End: 2020-03-05 | Stop reason: HOSPADM

## 2020-03-03 RX ORDER — ONDANSETRON 4 MG/1
4 TABLET, ORALLY DISINTEGRATING ORAL EVERY 6 HOURS PRN
Status: DISCONTINUED | OUTPATIENT
Start: 2020-03-03 | End: 2020-03-05 | Stop reason: HOSPADM

## 2020-03-03 RX ORDER — FENTANYL CITRATE 50 UG/ML
25-50 INJECTION, SOLUTION INTRAMUSCULAR; INTRAVENOUS
Status: DISCONTINUED | OUTPATIENT
Start: 2020-03-03 | End: 2020-03-03 | Stop reason: HOSPADM

## 2020-03-03 RX ORDER — BISACODYL 10 MG
10 SUPPOSITORY, RECTAL RECTAL DAILY PRN
Status: DISCONTINUED | OUTPATIENT
Start: 2020-03-03 | End: 2020-03-05 | Stop reason: HOSPADM

## 2020-03-03 RX ORDER — PROPOFOL 10 MG/ML
INJECTION, EMULSION INTRAVENOUS CONTINUOUS PRN
Status: DISCONTINUED | OUTPATIENT
Start: 2020-03-03 | End: 2020-03-03

## 2020-03-03 RX ORDER — ONDANSETRON 2 MG/ML
4 INJECTION INTRAMUSCULAR; INTRAVENOUS EVERY 6 HOURS PRN
Status: DISCONTINUED | OUTPATIENT
Start: 2020-03-03 | End: 2020-03-05 | Stop reason: HOSPADM

## 2020-03-03 RX ORDER — POLYETHYLENE GLYCOL 3350 17 G/17G
17 POWDER, FOR SOLUTION ORAL DAILY
Status: DISCONTINUED | OUTPATIENT
Start: 2020-03-03 | End: 2020-03-05 | Stop reason: HOSPADM

## 2020-03-03 RX ORDER — KETOROLAC TROMETHAMINE 30 MG/ML
30 INJECTION, SOLUTION INTRAMUSCULAR; INTRAVENOUS EVERY 6 HOURS PRN
Status: DISCONTINUED | OUTPATIENT
Start: 2020-03-03 | End: 2020-03-05 | Stop reason: HOSPADM

## 2020-03-03 RX ORDER — CYCLOBENZAPRINE HCL 10 MG
10 TABLET ORAL 3 TIMES DAILY PRN
Status: DISCONTINUED | OUTPATIENT
Start: 2020-03-03 | End: 2020-03-05 | Stop reason: HOSPADM

## 2020-03-03 RX ORDER — ACETAMINOPHEN 325 MG/1
975 TABLET ORAL EVERY 8 HOURS
Status: DISCONTINUED | OUTPATIENT
Start: 2020-03-03 | End: 2020-03-05 | Stop reason: HOSPADM

## 2020-03-03 RX ORDER — LOSARTAN POTASSIUM AND HYDROCHLOROTHIAZIDE 12.5; 5 MG/1; MG/1
1 TABLET ORAL DAILY
Status: DISCONTINUED | OUTPATIENT
Start: 2020-03-03 | End: 2020-03-03

## 2020-03-03 RX ORDER — NALOXONE HYDROCHLORIDE 0.4 MG/ML
.1-.4 INJECTION, SOLUTION INTRAMUSCULAR; INTRAVENOUS; SUBCUTANEOUS
Status: ACTIVE | OUTPATIENT
Start: 2020-03-03 | End: 2020-03-04

## 2020-03-03 RX ORDER — LIDOCAINE 40 MG/G
CREAM TOPICAL
Status: DISCONTINUED | OUTPATIENT
Start: 2020-03-03 | End: 2020-03-03 | Stop reason: HOSPADM

## 2020-03-03 RX ORDER — ONDANSETRON 2 MG/ML
INJECTION INTRAMUSCULAR; INTRAVENOUS PRN
Status: DISCONTINUED | OUTPATIENT
Start: 2020-03-03 | End: 2020-03-03

## 2020-03-03 RX ORDER — SODIUM CHLORIDE, SODIUM LACTATE, POTASSIUM CHLORIDE, CALCIUM CHLORIDE 600; 310; 30; 20 MG/100ML; MG/100ML; MG/100ML; MG/100ML
INJECTION, SOLUTION INTRAVENOUS CONTINUOUS
Status: DISCONTINUED | OUTPATIENT
Start: 2020-03-03 | End: 2020-03-03 | Stop reason: HOSPADM

## 2020-03-03 RX ORDER — HYDROCHLOROTHIAZIDE 12.5 MG/1
12.5 CAPSULE ORAL DAILY
Status: DISCONTINUED | OUTPATIENT
Start: 2020-03-03 | End: 2020-03-05 | Stop reason: HOSPADM

## 2020-03-03 RX ORDER — SENNOSIDES 8.6 MG
1300 CAPSULE ORAL DAILY PRN
Status: ON HOLD | COMMUNITY
End: 2020-03-04

## 2020-03-03 RX ORDER — HYDROXYZINE HYDROCHLORIDE 25 MG/1
25 TABLET, FILM COATED ORAL EVERY 6 HOURS PRN
Status: DISCONTINUED | OUTPATIENT
Start: 2020-03-03 | End: 2020-03-05 | Stop reason: HOSPADM

## 2020-03-03 RX ORDER — DEXAMETHASONE SODIUM PHOSPHATE 10 MG/ML
INJECTION, SOLUTION INTRAMUSCULAR; INTRAVENOUS PRN
Status: DISCONTINUED | OUTPATIENT
Start: 2020-03-03 | End: 2020-03-03

## 2020-03-03 RX ORDER — ONDANSETRON 2 MG/ML
4 INJECTION INTRAMUSCULAR; INTRAVENOUS EVERY 30 MIN PRN
Status: DISCONTINUED | OUTPATIENT
Start: 2020-03-03 | End: 2020-03-03 | Stop reason: HOSPADM

## 2020-03-03 RX ORDER — PROPOFOL 10 MG/ML
INJECTION, EMULSION INTRAVENOUS PRN
Status: DISCONTINUED | OUTPATIENT
Start: 2020-03-03 | End: 2020-03-03

## 2020-03-03 RX ORDER — FENTANYL CITRATE 50 UG/ML
INJECTION, SOLUTION INTRAMUSCULAR; INTRAVENOUS PRN
Status: DISCONTINUED | OUTPATIENT
Start: 2020-03-03 | End: 2020-03-03

## 2020-03-03 RX ORDER — BUPIVACAINE HYDROCHLORIDE 7.5 MG/ML
INJECTION, SOLUTION INTRASPINAL PRN
Status: DISCONTINUED | OUTPATIENT
Start: 2020-03-03 | End: 2020-03-03

## 2020-03-03 RX ORDER — ATORVASTATIN CALCIUM 10 MG/1
10 TABLET, FILM COATED ORAL DAILY
Status: DISCONTINUED | OUTPATIENT
Start: 2020-03-03 | End: 2020-03-05 | Stop reason: HOSPADM

## 2020-03-03 RX ORDER — AMOXICILLIN 250 MG
2 CAPSULE ORAL 2 TIMES DAILY
Status: DISCONTINUED | OUTPATIENT
Start: 2020-03-03 | End: 2020-03-05 | Stop reason: HOSPADM

## 2020-03-03 RX ORDER — METFORMIN HCL 500 MG
1000 TABLET, EXTENDED RELEASE 24 HR ORAL
Status: DISCONTINUED | OUTPATIENT
Start: 2020-03-03 | End: 2020-03-03 | Stop reason: CLARIF

## 2020-03-03 RX ORDER — NAPROXEN SODIUM 220 MG
220 TABLET ORAL DAILY PRN
COMMUNITY
End: 2021-02-10

## 2020-03-03 RX ORDER — OXYCODONE HYDROCHLORIDE 5 MG/1
5-10 TABLET ORAL
Status: DISCONTINUED | OUTPATIENT
Start: 2020-03-03 | End: 2020-03-05 | Stop reason: HOSPADM

## 2020-03-03 RX ADMIN — ACETAMINOPHEN 975 MG: 325 TABLET, FILM COATED ORAL at 13:59

## 2020-03-03 RX ADMIN — CEFAZOLIN 2 G: 1 INJECTION, POWDER, FOR SOLUTION INTRAMUSCULAR; INTRAVENOUS at 09:57

## 2020-03-03 RX ADMIN — CEFAZOLIN 3 G: 1 INJECTION, POWDER, FOR SOLUTION INTRAMUSCULAR; INTRAVENOUS at 07:31

## 2020-03-03 RX ADMIN — MIDAZOLAM 2 MG: 1 INJECTION INTRAMUSCULAR; INTRAVENOUS at 07:37

## 2020-03-03 RX ADMIN — SENNOSIDES AND DOCUSATE SODIUM 2 TABLET: 8.6; 5 TABLET ORAL at 14:00

## 2020-03-03 RX ADMIN — KETOROLAC TROMETHAMINE 30 MG: 30 INJECTION, SOLUTION INTRAMUSCULAR; INTRAVENOUS at 20:19

## 2020-03-03 RX ADMIN — FENTANYL CITRATE 25 MCG: 50 INJECTION, SOLUTION INTRAMUSCULAR; INTRAVENOUS at 07:57

## 2020-03-03 RX ADMIN — FENTANYL CITRATE 50 MCG: 50 INJECTION, SOLUTION INTRAMUSCULAR; INTRAVENOUS at 11:29

## 2020-03-03 RX ADMIN — ROCURONIUM BROMIDE 20 MG: 10 INJECTION INTRAVENOUS at 09:13

## 2020-03-03 RX ADMIN — PROPOFOL 150 MG: 10 INJECTION, EMULSION INTRAVENOUS at 08:18

## 2020-03-03 RX ADMIN — FENTANYL CITRATE 25 MCG: 50 INJECTION, SOLUTION INTRAMUSCULAR; INTRAVENOUS at 07:42

## 2020-03-03 RX ADMIN — PROPOFOL 40 MG: 10 INJECTION, EMULSION INTRAVENOUS at 07:51

## 2020-03-03 RX ADMIN — POLYETHYLENE GLYCOL 3350 17 G: 17 POWDER, FOR SOLUTION ORAL at 13:58

## 2020-03-03 RX ADMIN — ONDANSETRON 4 MG: 2 INJECTION INTRAMUSCULAR; INTRAVENOUS at 10:04

## 2020-03-03 RX ADMIN — TRANEXAMIC ACID 1 G: 1 INJECTION, SOLUTION INTRAVENOUS at 07:31

## 2020-03-03 RX ADMIN — ACETAMINOPHEN 975 MG: 325 TABLET, FILM COATED ORAL at 21:30

## 2020-03-03 RX ADMIN — SODIUM CHLORIDE, POTASSIUM CHLORIDE, SODIUM LACTATE AND CALCIUM CHLORIDE: 600; 310; 30; 20 INJECTION, SOLUTION INTRAVENOUS at 07:31

## 2020-03-03 RX ADMIN — SODIUM CHLORIDE, POTASSIUM CHLORIDE, SODIUM LACTATE AND CALCIUM CHLORIDE: 600; 310; 30; 20 INJECTION, SOLUTION INTRAVENOUS at 09:39

## 2020-03-03 RX ADMIN — FENTANYL CITRATE 50 MCG: 50 INJECTION, SOLUTION INTRAMUSCULAR; INTRAVENOUS at 10:28

## 2020-03-03 RX ADMIN — TRANEXAMIC ACID 1 G: 1 INJECTION, SOLUTION INTRAVENOUS at 09:39

## 2020-03-03 RX ADMIN — ROCURONIUM BROMIDE 20 MG: 10 INJECTION INTRAVENOUS at 08:34

## 2020-03-03 RX ADMIN — OXYCODONE HYDROCHLORIDE 10 MG: 5 TABLET ORAL at 17:23

## 2020-03-03 RX ADMIN — OXYCODONE HYDROCHLORIDE 5 MG: 5 TABLET ORAL at 23:51

## 2020-03-03 RX ADMIN — HYDROCHLOROTHIAZIDE 12.5 MG: 12.5 CAPSULE ORAL at 14:04

## 2020-03-03 RX ADMIN — BUPIVACAINE HYDROCHLORIDE IN DEXTROSE 2 ML: 7.5 INJECTION, SOLUTION SUBARACHNOID at 07:42

## 2020-03-03 RX ADMIN — LOSARTAN POTASSIUM 50 MG: 50 TABLET, FILM COATED ORAL at 14:04

## 2020-03-03 RX ADMIN — CEFAZOLIN 3 G: 1 INJECTION, POWDER, FOR SOLUTION INTRAMUSCULAR; INTRAVENOUS at 17:42

## 2020-03-03 RX ADMIN — Medication 80 MG: at 08:26

## 2020-03-03 RX ADMIN — FENTANYL CITRATE 50 MCG: 50 INJECTION, SOLUTION INTRAMUSCULAR; INTRAVENOUS at 07:40

## 2020-03-03 RX ADMIN — PROPOFOL 100 MCG/KG/MIN: 10 INJECTION, EMULSION INTRAVENOUS at 07:54

## 2020-03-03 RX ADMIN — ATORVASTATIN CALCIUM 10 MG: 10 TABLET, FILM COATED ORAL at 14:00

## 2020-03-03 RX ADMIN — CYCLOBENZAPRINE 10 MG: 10 TABLET, FILM COATED ORAL at 16:25

## 2020-03-03 RX ADMIN — PROPOFOL 150 MG: 10 INJECTION, EMULSION INTRAVENOUS at 08:26

## 2020-03-03 RX ADMIN — HYDROMORPHONE HYDROCHLORIDE 0.2 MG: 1 INJECTION, SOLUTION INTRAMUSCULAR; INTRAVENOUS; SUBCUTANEOUS at 19:50

## 2020-03-03 RX ADMIN — PROPOFOL 30 MG: 10 INJECTION, EMULSION INTRAVENOUS at 07:45

## 2020-03-03 RX ADMIN — Medication 60 MG: at 08:18

## 2020-03-03 RX ADMIN — OXYCODONE HYDROCHLORIDE 5 MG: 5 TABLET ORAL at 14:01

## 2020-03-03 RX ADMIN — SODIUM CHLORIDE: 9 INJECTION, SOLUTION INTRAVENOUS at 12:37

## 2020-03-03 RX ADMIN — DEXAMETHASONE SODIUM PHOSPHATE 10 MG: 10 INJECTION, SOLUTION INTRAMUSCULAR; INTRAVENOUS at 08:42

## 2020-03-03 RX ADMIN — CYCLOBENZAPRINE 10 MG: 10 TABLET, FILM COATED ORAL at 23:51

## 2020-03-03 RX ADMIN — FENTANYL CITRATE 50 MCG: 50 INJECTION, SOLUTION INTRAMUSCULAR; INTRAVENOUS at 11:05

## 2020-03-03 RX ADMIN — OXYCODONE HYDROCHLORIDE 10 MG: 5 TABLET ORAL at 20:18

## 2020-03-03 ASSESSMENT — ACTIVITIES OF DAILY LIVING (ADL): ADLS_ACUITY_SCORE: 13

## 2020-03-03 ASSESSMENT — MIFFLIN-ST. JEOR: SCORE: 2378.94

## 2020-03-03 NOTE — ANESTHESIA PROCEDURE NOTES
Peripheral nerve/Neuraxial procedure note : intrathecal  Pre-Procedure  Performed by  Saúl Tabares APRN CRNA   Location: OR    Procedure Times:3/3/2020 7:38 AM and 3/3/2020 7:42 AM  Pre-Anesthestic Checklist: patient identified, IV checked, risks and benefits discussed, informed consent, monitors and equipment checked, pre-op evaluation, at physician/surgeon's request and post-op pain management    Timeout  Correct Patient: Yes   Correct Procedure: Yes   Correct Site: Yes   Correct Laterality: Yes   Correct Position: Yes   Site Marked: Yes, N/A   .   Procedure Documentation  ASA 3  .    Procedure: intrathecal, .   Patient Position:sitting Insertion Site:L3-4  (midline approach)     Patient Prep/Sterile Barriers; povidone-iodine 7.5% surgical scrub.  .  Needle:  Spinal Needle (gauge): 25  Spinal/LP Needle Length (inches): 5 # of attempts: 1 and  # of redirects:  1 Introducer used Introducer: 20 G .        Assessment/Narrative  Paresthesias: No.  .  .  clear CSF fluid removed .

## 2020-03-03 NOTE — OR NURSING
"Pt awake and talking, states pain 1-2/10, one dose of fentanyl 50mcg given. Pt has sensation in both legs but states some numbness , \"pin and needles\"   "

## 2020-03-03 NOTE — PROGRESS NOTES
Inpatient Physical Therapy Evaluation    Name: Glen Rogers MRN# [id number]   Age: 54 year old YOB: 1965     Date of Consultation: March 3, 2020  Consultation is requested by:  Dr. Bryant  Specific Consultation Request:  Post op total hip  Primary care provider: Zak Schrader    General Information:   Onset Date: 3/3/2020    History of Current Problem/Admission: Osteoarthritis of left hip, unspecified osteoarthritis type [M16.12]    Prior Level of Function: Pt reports he was completely independent with all ADLs and ambulation prior to surgery.  Pt works and was working up until yesterday.  Pt drives.  Ambulation: 0 - Independent   Transferrin - Independent   Toiletin - Independent    Bathin - Independent   Dressin - Independent   Eatin - Independent   Communication: 0 - Understands/communicates without difficulty  Swallowin - swallows foods/liquids without difficulty  Cognition: 0 - no cognitive issues reported    Fall history within the last 6 months: Yes, had 1 fall off ladder in December    Current Living Situation: Patient has 2 steps without rail to enter home.  Pt lives in a split entry with 5 steps up to main living area, has bilateral rails.  Pt lives with his wife    Current Equipment Used at Home: none     Patient & Family Goals: to go home tomorrow     Past Medical History:   Past Medical History:   Diagnosis Date     Diabetes mellitus, type 2 (H) 3/18/2015     Essential hypertension 2015     FH: colon cancer     father     HTN (hypertension)     borderline     Obesity      GIULIANA on CPAP      PONV (postoperative nausea and vomiting)      Type 2 diabetes mellitus without complication (H) 2015       Past Surgical History:  Past Surgical History:   Procedure Laterality Date     COLONOSCOPY N/A 2015    Repeat in ??/Procedure: COLONOSCOPY;  Surgeon: Ousmane Eddy DO;  Location: [facility] OR     ENT SURGERY      tonsillectomy       Medications:    Current Facility-Administered Medications   Medication     [START ON 3/6/2020] acetaminophen (TYLENOL) tablet 650 mg     acetaminophen (TYLENOL) tablet 975 mg     [START ON 3/4/2020] aspirin (ASA) EC tablet 325 mg     atorvastatin (LIPITOR) tablet 10 mg     benzocaine-menthol (CEPACOL) 15-3.6 MG lozenge 1-2 lozenge     bisacodyl (DULCOLAX) Suppository 10 mg     ceFAZolin (ANCEF) 3 g in sodium chloride 0.9 % 100 mL intermittent infusion     cyclobenzaprine (FLEXERIL) tablet 10 mg     losartan (COZAAR) tablet 50 mg    And     hydrochlorothiazide (MICROZIDE) capsule 12.5 mg     HYDROmorphone (PF) (DILAUDID) injection 0.2 mg     hydrOXYzine (ATARAX) tablet 25 mg     lidocaine (LMX4) kit     lidocaine 1 % 0.1-1 mL     naloxone (NARCAN) injection 0.1-0.4 mg     ondansetron (ZOFRAN-ODT) ODT tab 4 mg    Or     ondansetron (ZOFRAN) injection 4 mg     oxyCODONE (ROXICODONE) tablet 5-10 mg     polyethylene glycol (MIRALAX) Packet 17 g     senna-docusate (SENOKOT-S/PERICOLACE) 8.6-50 MG per tablet 2 tablet     sodium chloride (PF) 0.9% PF flush 3 mL     sodium chloride (PF) 0.9% PF flush 3 mL     sodium chloride 0.9% infusion       Weight Bearing Status: WBAT     Cognitive Status Examination:  Orientation: oriented to time, place and person  Level of Consciousness: alert  Follows Commands and Answers Questions: 100% of the time  Personal Safety and Judgement: Intact  Memory: Short-term memory intact and Long-term memory intact  Comments:     Pain:   Currently in pain? Yes  Pain Location? left hip  Pain Ratin    Physical Therapy Evaluation:   Integumentary/Edema: incision covered left hip  ROM: LE AROM WFL  Strength: MMT deferred on left due to hip pain but appears WFL throughout   Bed Mobility: sup>sit SBA with HOB slightly elevated  Transfers: sit<>stand CGA up to FWW with cues needed for safe hand placement  Gait: ambulated 3' forward with FWW CGA before complaining of lightheadedness/nausea so brought chair behind  patient.  BP assessed and noted to be 87/41, HR 65, O2 sats 95%, nursing notified and present to assess.    Stairs: NT  Balance: good with support from walker  Sensory: denies numbness/tingling LEs  Coordination: NT    Physical Therapy Interventions: Bed Mobility, Gait Training , Strengthening, Transfer Training, Risk Factor Education, Home Programming Guidelines and Progressive Activity/Exercise     Clinical Impressions:  Criteria for Skilled Therapeutic Intervention Met: Yes, treatment indicated  PT Diagnosis: gait disturbance s/p L VIKY  Influenced by the following impairments: pain, decreased activity tolerance  Functional limitations due to impairment: decreased tolerance for transfers, ambulation, and stairs  Clinical presentation: Evolving/Changing  Clinical presentation rationale: clinical judgement  Clinical Decision making (complexity): Low Complexity  Frequency: 2 times/day   Predicted Duration of Therapy Intervention (days/wks): 5 days  Anticipated Discharge Disposition: Home vs Home with Outpatient Therapy   Anticipated Equipment Needs at Discharge: front wheeled walker  Risks and Benefits of therapy have been explained: Yes  Patient, Family & other staff in agreement with plan of care: Yes  Clinical Impression Comments: Initiated PT eval however pt had poor tolerance to mobilization with symptomatic drop in BP.  Based on pt's prior level of function and age, anticipate he will be able to discharge home with FWW, may or may not need outpatient PT pending his tolerance and progression tomorrow.      Total Eval Time: 26

## 2020-03-03 NOTE — OP NOTE
Preoperative Diagnosis: 1)Left Hip Osteoarthritis;      2) Morbid Obesity BMI: 44 kg/m2  Postoperative Diagnosis: Left Hip Osteoarthritis; morbid obesity    Procedure: Left Direct Anterior Total Hip Arthroplasty    Surgeon: Can Bryant MD    Assistants: Armond ADAMS    A skilled first assistant was required for patient positioning, retracting, and carrying out the procedure in a safe and effective manner    Anesthesia:   1) Spinal converted to General   2) Local Injection around surgical site    Findings:    1) Satisfactory VIKY with near equal restoration of leg length and offset postoperatively    2) Adequate hemostasis     Implants:    1) Smith [employer]hew Polar Femoral Stem Size 3 Lateral Offset   2) Size 56 mm R3 Acetabular Shell   3) Size 36 mm standard poly acetabular insert   4) Size 36 mm 0 Oxinium Femoral Head      Estimated Blood Loss: 600 ml    Specimens: None    Drains: None    Complications: None    Indications:   This is a 54 year old patient with morbid obesity long standing Left hip pain and severe osteoarthritis.  They have failed nonoperative treatment including use of NSAIDs; Tylenol; injections and exercise.  Given the continued symptoms they wished to proceed with a hip replacement.  The risks including pain, bleeding, infection, deep vein thrombosis, pulmonary embolism, myocardial infarction, component failure, need for revision operation was discussed with the patient.  The surgical consent was signed and surgical site was marked.  All questions regarding postoperative disposition were answered.      Description of Procedure:   The patient was taken to the operating room and placed under spinal anesthesia.  They were then moved to the [place] bed and positioned in standard fashion.  The C-arm was used to make a templating radiograph for post reconstruction comparison.  The Left hip was prepped with a Chloraprep wipe and sponge then draped in sterile fashion.  A timeout was called to identify  the correct patient and surgical site.  An anterolateral incision and direct anterior approach to the hip was utilized.  Care was taken to cauterize the circumflex vessels.  A capsulotomy was completed and tag sutures were placed.  The femoral neck was exposed and an osteotomy was completed.  A secondary cut was made to make removing the femoral head/neck easier.  The hip was externally rotated and the labrum excised.  Further release on the proximal femur was completed to improve visualization of the proximal femur later in the case.  The C-arm was used during reaming the acetabulum to check alignment and depth of reaming.  The acetabulum was reamed to a size 56 mm and the acetabular shell was placed.  A 6.5 mm bone screw 25 mm in length was placed along with a hole eliminator.  The final 36 mm poly insert was placed and checked for a secure fit.  The traction was released and standard capsular releases were completed on the proximal femur.  The leg was externally rotated 120 degrees and brought into adduction and extension.  Standard retractor placement was utilized.  The femur was prepared with a box osteotome, canal finder and broaching up to a size 3.  The trial implants were placed and the hip was reduced.  C-arm was used to compare to preoperative leg length and offset.  The final size 3 Femoral stem was placed with a size 36 0 Oxinium femoral head and the hip was reduced.  Final C-arm images were used to confirm positioning.  No fractures were identified.  Pulse lavage and betadine irrigation was used.  The capsule was closed with #1 Vicryl sutures.  The tensor fascia was closed with a #1 Vicryl suture in running fashion.  The subcutaneous tissue was closed with a 2-0 Vicryl and staples. An Aquacel dressing was applied.  The patient was awakened and transferred to PACU in stable condition.  A post operative x-ray was taken in PACU.        Postoperative Plan:   Routine VIKY protocol (Weightbearing as  tolerated, No hip ROM precautions, PT, Pain control, DVT prophylaxis with Aspirin).  Anticipate discharge in 1-2 days.  Follow-up in  2 weeks in [facility].  X-rays at follow-up AP and Cross Table Lateral Left Hip.

## 2020-03-03 NOTE — ANESTHESIA POSTPROCEDURE EVALUATION
Patient: Glen Rogers    Procedure(s):  DIRECT ANTERIOR LEFT TOTAL HIP ARTHROPLASTY    Diagnosis:Osteoarthritis of left hip, unspecified osteoarthritis type [M16.12]  Diagnosis Additional Information: No value filed.    Anesthesia Type:  Spinal, Peripheral Nerve Block, For Post-op pain in coordination with surgeon    Note:  Anesthesia Post Evaluation    Patient location during evaluation: PACU  Patient participation: Able to fully participate in evaluation  Level of consciousness: awake and alert  Pain management: adequate  Airway patency: patent  Cardiovascular status: acceptable  Respiratory status: acceptable  Hydration status: acceptable             Last vitals:  Vitals:    03/03/20 1153 03/03/20 1200 03/03/20 1215   BP: 141/68  128/67   Pulse: 73  74   Resp: 12     Temp: 96.9  F (36.1  C)     SpO2: 96% 97% 94%         Electronically Signed By: CHRISTOPHER Ware CRNA  March 3, 2020  12:56 PM

## 2020-03-03 NOTE — PLAN OF CARE
Discharge Planner PT   Patient plan for discharge: home  Current status: sup>sit SBA with HOB slightly elevated, sit<>stand CGA up to FWW with cues needed for safe hand placement, ambulated 3' forward with FWW CGA before complaining of lightheadedness/nausea so brought chair behind patient.  BP assessed and noted to be 87/41, HR 65, O2 sats 95%, nursing notified and present to assess.    Barriers to return to prior living situation: stairs, BP stabilization  Recommendations for discharge: home vs home with outpatient PT  Rationale for recommendations: Initiated PT eval however pt had poor tolerance to mobilization with symptomatic drop in BP.  Based on pt's prior level of function and age, anticipate he will be able to discharge home with FWW, may or may not need outpatient PT pending his tolerance and progression tomorrow.         Entered by: Tierra Patten, PT 03/03/2020 4:10 PM

## 2020-03-03 NOTE — ANESTHESIA CARE TRANSFER NOTE
Patient: Glen Rogers    Procedure(s):  DIRECT ANTERIOR LEFT TOTAL HIP ARTHROPLASTY    Diagnosis: Osteoarthritis of left hip, unspecified osteoarthritis type [M16.12]  Diagnosis Additional Information: No value filed.    Anesthesia Type:   Spinal, Peripheral Nerve Block, For Post-op pain in coordination with surgeon     Note:  Airway :Nasal Cannula  Patient transferred to:PACU  Handoff Report: Identifed the Patient, Identified the Reponsible Provider, Reviewed the pertinent medical history, Discussed the surgical course, Reviewed Intra-OP anesthesia mangement and issues during anesthesia, Set expectations for post-procedure period and Allowed opportunity for questions and acknowledgement of understanding      Vitals: (Last set prior to Anesthesia Care Transfer)    CRNA VITALS  3/3/2020 1003 - 3/3/2020 1044      3/3/2020             Pulse:  78    SpO2:  95 %    Resp Rate (set):  8                Electronically Signed By: CHRISTOPHER Ware CRNA  March 3, 2020  10:44 AM

## 2020-03-03 NOTE — PLAN OF CARE
[facility] Inpatient Admission Note:    Patient admitted to 3100 at approximately 1150 via cart accompanied by spouse from surgery . Report received from MAYRA Glasgow in SBAR format at 1150 via face to face in room. Patient is alert and oriented X 3, reports pain; rates at 2 on 0-10 scale.  Patient oriented to room, unit, hourly rounding, and plan of care. Explained admission packet and patient handbook with patient bill of rights brochure. Will continue to monitor and document as needed.     Inpatient Nursing criteria listed below was met:    Health care directives status obtained and documented: Yes    Care Everywhere authorization obtained No    MRSA swab completed for patient 65 years and older: N/A    Patient identifies a surrogate decision maker: Yes If yes, who: Nancy, wife     Vaccination assessment and education completed: Yes   Vaccinations received prior to admission: Pneumovax: Yes  Influenza(seasonal)  YES       Clergy visit ordered if patient requests: N/A    Skin issues/needs documented: Yes    Isolation Patient: no Education given, correct sign in place and documentation row added to PCS:  N/A    Fall Prevention Yes: Care plan updated, education given and documented, sticker and magnet in place: Yes    Care Plan initiated: Yes    Education Documented (including assessment): Yes    Patient has discharge needs : Yes If yes, please explain: ? Possibly rehab    Upon initial assessment, patient A&Ox3 and reports feelings of numbness/tingling in lower extremities bilaterally. Patient is able to lift all extremities spontaneously. CMS checks WDL. Skin is warm and dry. L/S clear. No shadowing noted on occlusive dressing. Patient ordered Regular diet but denies feeling hungry. Fluids encouraged. Normal Saline running at 100 mL/hr. Patient reported 4-5/10 sore, aching pain in left hip. Patient reported allergy to morphine with past reaction of hives and itching. After speaking with pharmacist and Dr. Moran  speaking with patient, d/t patient requesting medication for pain, oxycodone 5 mg administered with instructions to patient to immediately report if he begins to feel itchy or notices hives. Will continue to monitor for side effects from medications. Patient got up with PT and was only able to ambulate to chair. Patient reported feeling nauseous and light-headed. Patient agreed to drink orange juice and ginger-tam and ordered food. After resting in chair for short while, patient reported nausea had subsided and VSS.    Face to face given to: MAYRA Singh RN

## 2020-03-03 NOTE — PLAN OF CARE
Prior to Admission Medication Reconciliation:     Medications added:   [] None  [x] As listed below: patient reports taking OTC meds most days    apap arthritis    aleve    Medications deleted:   [] None  [x] As listed below:    Test strips/lancets patient reports not needing or using    Changes made to existing medications:   [] None  [x] As listed below:    Input note about metformin- patient hasn't been taking for the last couple of months. Controls blood sugar with a low carb diet.     Last times/dates taken verified with patient:  [x] Yes- completed myself  [] Nurse completed no changes made  [] Unable to review with patient:  [] Nurse completed/changes made:     Allergy modifications:    []Did not review  []Patient verified NKA  [x]Patient verified current existing allergies: no changes made  []New allergies: listed below      Medication reconciliation sources:   [x]Patient  []Patient family member/emergency contact: **  [][facility] Report Review  []Epic Chart Review  []Care Everywhere review  [x]Pharmacy med list: [facility]  []Pharmacy phone call  [x]Outside meds dispense report:  Medication Dispense History (from 3/4/2019 to 2/19/2020)   Expand All  Collapse All   Atorvastatin Calcium     Dispensed Days Supply Quantity Provider Pharmacy   ATORVASTATIN CALCIUM 10 MG TABS 12/23/2019 90 90 tablet  [facility]   ATORVASTATIN CALCIUM 10 MG TABS 07/15/2019 90 90 tablet  [facility]   ATORVASTATIN CALCIUM 10 MG TABS 04/17/2019 90 90 tablet Zak Schrader MD [facility]         Influenza Vac Split Quad     Dispensed Days Supply Quantity Provider Pharmacy   FLUARIX QIV PFS [id number] INJ 0.5ML 10/16/2019 1 0.5 mL [facility]         Losartan Potassium-HCTZ     Dispensed Days Supply Quantity Provider Pharmacy   LOSARTAN/HCT TAB 50-12.5 12/23/2019 90 90 tablet  [facility]   LOSARTAN/HCT TAB 50-12.5 07/15/2019 90 90 tablet  [facility]   LOSARTAN/HCT TAB 50-12.5 04/16/2019 90 90 tablet Zak Schrader [facility]         metFORMIN HCl     Dispensed Days Supply Quantity Provider Pharmacy   METFORMIN HCL  MG TB24 01/15/2020 90 180 tablet  [facility]   METFORMIN HCL  MG TB24 07/15/2019 90 180 tablet  [facility]           []Nursing home MAR:  []Other: **    Is patient on coumadin?  [x]No    Requests for consultation by provider or pharmacist:   [x] Patient understands why all of their meds were prescribed and how to take them. No questions.   [x] Fill dates coincide with compliancy for all maintenance meds.   [] Fill dates do not coincide with compliancy with maintenance meds. See notes in PTA medlist about how patient is taking.   [] Patient has questions about the following:      Comments: patient alert and oriented. No concerns.       Katya Chen on 3/3/2020 at 12:02 PM       Discrepancies: [x] No []Not Applicable []Yes: listed below    Time spent on medication reconciliation:   [x]5-20 mins  []20-40 mins  []> 40 mins    Issues completing PTA medication reconciliation:  [] On hold for a long time  [] Waited for a call back  [] Fax didn't come through  [] Fax took a long time  [] Other:    Notifying appropriate party of changes/additions/discrepancies:  []No pertinent changes made, notification not necessary.   [] Notified attending provider via text page  [] Notified attending provider in person  [x] Notified pharmacy  [] Notified nurse  [] Attending provider not available, left detailed notes  [] Changes/additions made don't need provider notification because provider has not seen patient or input any orders  [] Changes/additions made don't need provider notification because changes made are to medications not ordered      Medications Prior to Admission   Medication Sig Dispense Refill Last Dose     acetaminophen (TYLENOL) 650 MG CR tablet Take 1,300 mg by mouth daily  as needed for mild pain or fever   2/27/2020 at 0500     atorvastatin (LIPITOR) 10 MG tablet Take 1 tablet (10 mg) by mouth daily 90 tablet 2 2/28/2020 at 0500     losartan-hydrochlorothiazide (HYZAAR) 50-12.5 MG tablet Take 1 tablet by mouth daily 90 tablet 2 2/28/2020 at 0500     naproxen sodium (ANAPROX) 220 MG tablet Take 220 mg by mouth daily as needed in the afternoon   2/27/2020 at noon     metFORMIN (GLUCOPHAGE-XR) 500 MG 24 hr tablet Take 2 tablets (1,000 mg) by mouth daily (with dinner) 180 tablet 2 More than a month at Unknown time

## 2020-03-04 ENCOUNTER — APPOINTMENT (OUTPATIENT)
Dept: OCCUPATIONAL THERAPY | Facility: HOSPITAL | Age: 55
End: 2020-03-04
Attending: ORTHOPAEDIC SURGERY
Payer: COMMERCIAL

## 2020-03-04 ENCOUNTER — APPOINTMENT (OUTPATIENT)
Dept: PHYSICAL THERAPY | Facility: HOSPITAL | Age: 55
End: 2020-03-04
Attending: ORTHOPAEDIC SURGERY
Payer: COMMERCIAL

## 2020-03-04 LAB
COPATH REPORT: NORMAL
GLUCOSE BLDC GLUCOMTR-MCNC: 125 MG/DL (ref 70–99)
GLUCOSE SERPL-MCNC: 148 MG/DL (ref 70–99)
HGB BLD-MCNC: 10.5 G/DL (ref 13.3–17.7)

## 2020-03-04 PROCEDURE — 99231 SBSQ HOSP IP/OBS SF/LOW 25: CPT | Mod: 24 | Performed by: INTERNAL MEDICINE

## 2020-03-04 PROCEDURE — 25000132 ZZH RX MED GY IP 250 OP 250 PS 637: Performed by: ORTHOPAEDIC SURGERY

## 2020-03-04 PROCEDURE — 82947 ASSAY GLUCOSE BLOOD QUANT: CPT | Performed by: ORTHOPAEDIC SURGERY

## 2020-03-04 PROCEDURE — 25000128 H RX IP 250 OP 636: Performed by: INTERNAL MEDICINE

## 2020-03-04 PROCEDURE — 97530 THERAPEUTIC ACTIVITIES: CPT | Mod: GP | Performed by: PHYSICAL THERAPIST

## 2020-03-04 PROCEDURE — 12000000 ZZH R&B MED SURG/OB

## 2020-03-04 PROCEDURE — 97530 THERAPEUTIC ACTIVITIES: CPT | Mod: GP

## 2020-03-04 PROCEDURE — 97530 THERAPEUTIC ACTIVITIES: CPT | Mod: GO

## 2020-03-04 PROCEDURE — 40000275 ZZH STATISTIC RCP TIME EA 10 MIN

## 2020-03-04 PROCEDURE — 00000146 ZZHCL STATISTIC GLUCOSE BY METER IP

## 2020-03-04 PROCEDURE — 36415 COLL VENOUS BLD VENIPUNCTURE: CPT | Performed by: ORTHOPAEDIC SURGERY

## 2020-03-04 PROCEDURE — 97165 OT EVAL LOW COMPLEX 30 MIN: CPT | Mod: GO

## 2020-03-04 PROCEDURE — 25800030 ZZH RX IP 258 OP 636: Performed by: ORTHOPAEDIC SURGERY

## 2020-03-04 PROCEDURE — 25000128 H RX IP 250 OP 636: Performed by: ORTHOPAEDIC SURGERY

## 2020-03-04 PROCEDURE — 85018 HEMOGLOBIN: CPT | Performed by: ORTHOPAEDIC SURGERY

## 2020-03-04 RX ORDER — ACETAMINOPHEN 325 MG/1
650 TABLET ORAL EVERY 4 HOURS PRN
Qty: 60 TABLET | Refills: 0 | Status: SHIPPED | OUTPATIENT
Start: 2020-03-04 | End: 2021-02-10

## 2020-03-04 RX ORDER — CEPHALEXIN 500 MG/1
500 CAPSULE ORAL 4 TIMES DAILY
Qty: 40 CAPSULE | Refills: 0 | Status: SHIPPED | OUTPATIENT
Start: 2020-03-04 | End: 2020-08-10

## 2020-03-04 RX ORDER — OXYCODONE HYDROCHLORIDE 5 MG/1
5-10 TABLET ORAL
Qty: 30 TABLET | Refills: 0 | Status: SHIPPED | OUTPATIENT
Start: 2020-03-04 | End: 2021-02-10

## 2020-03-04 RX ORDER — ACETAMINOPHEN 325 MG/1
650 TABLET ORAL EVERY 4 HOURS PRN
Qty: 60 TABLET | Refills: 0 | Status: SHIPPED | OUTPATIENT
Start: 2020-03-06 | End: 2020-03-04

## 2020-03-04 RX ORDER — ASPIRIN 325 MG
325 TABLET, DELAYED RELEASE (ENTERIC COATED) ORAL
Qty: 60 TABLET | Refills: 0 | Status: SHIPPED | OUTPATIENT
Start: 2020-03-04 | End: 2020-08-10

## 2020-03-04 RX ADMIN — OXYCODONE HYDROCHLORIDE 10 MG: 5 TABLET ORAL at 23:56

## 2020-03-04 RX ADMIN — KETOROLAC TROMETHAMINE 30 MG: 30 INJECTION, SOLUTION INTRAMUSCULAR; INTRAVENOUS at 11:02

## 2020-03-04 RX ADMIN — ASPIRIN 325 MG: 325 TABLET, DELAYED RELEASE ORAL at 08:48

## 2020-03-04 RX ADMIN — OXYCODONE HYDROCHLORIDE 10 MG: 5 TABLET ORAL at 03:56

## 2020-03-04 RX ADMIN — OXYCODONE HYDROCHLORIDE 10 MG: 5 TABLET ORAL at 07:09

## 2020-03-04 RX ADMIN — ACETAMINOPHEN 975 MG: 325 TABLET, FILM COATED ORAL at 05:30

## 2020-03-04 RX ADMIN — ACETAMINOPHEN 975 MG: 325 TABLET, FILM COATED ORAL at 20:46

## 2020-03-04 RX ADMIN — ATORVASTATIN CALCIUM 10 MG: 10 TABLET, FILM COATED ORAL at 08:48

## 2020-03-04 RX ADMIN — ASPIRIN 325 MG: 325 TABLET, DELAYED RELEASE ORAL at 15:47

## 2020-03-04 RX ADMIN — OXYCODONE HYDROCHLORIDE 10 MG: 5 TABLET ORAL at 09:57

## 2020-03-04 RX ADMIN — BENZOCAINE AND MENTHOL 1 LOZENGE: 15; 3.6 LOZENGE ORAL at 16:03

## 2020-03-04 RX ADMIN — ACETAMINOPHEN 975 MG: 325 TABLET, FILM COATED ORAL at 13:27

## 2020-03-04 RX ADMIN — OXYCODONE HYDROCHLORIDE 10 MG: 5 TABLET ORAL at 16:03

## 2020-03-04 RX ADMIN — SENNOSIDES AND DOCUSATE SODIUM 2 TABLET: 8.6; 5 TABLET ORAL at 20:46

## 2020-03-04 RX ADMIN — SENNOSIDES AND DOCUSATE SODIUM 2 TABLET: 8.6; 5 TABLET ORAL at 08:48

## 2020-03-04 RX ADMIN — OXYCODONE HYDROCHLORIDE 10 MG: 5 TABLET ORAL at 20:47

## 2020-03-04 RX ADMIN — CEFAZOLIN 3 G: 1 INJECTION, POWDER, FOR SOLUTION INTRAMUSCULAR; INTRAVENOUS at 02:16

## 2020-03-04 RX ADMIN — CYCLOBENZAPRINE 10 MG: 10 TABLET, FILM COATED ORAL at 23:56

## 2020-03-04 RX ADMIN — HYDROXYZINE HYDROCHLORIDE 25 MG: 25 TABLET, FILM COATED ORAL at 16:03

## 2020-03-04 RX ADMIN — KETOROLAC TROMETHAMINE 30 MG: 30 INJECTION, SOLUTION INTRAMUSCULAR; INTRAVENOUS at 18:53

## 2020-03-04 RX ADMIN — POLYETHYLENE GLYCOL 3350 17 G: 17 POWDER, FOR SOLUTION ORAL at 08:48

## 2020-03-04 RX ADMIN — CYCLOBENZAPRINE 10 MG: 10 TABLET, FILM COATED ORAL at 16:03

## 2020-03-04 ASSESSMENT — ACTIVITIES OF DAILY LIVING (ADL)
ADLS_ACUITY_SCORE: 13

## 2020-03-04 NOTE — PROGRESS NOTES
Subjective: The patient is POD #1 s/p L Total Hip Arthroplasty.  The patients pain is controlled fairly well.  They deny shortness of breath, chest pain, nausea or vomiting.  There have been no acute perioperative complications    Objective:   B/P: 104/57, Temp: 97.9, Pulse: 81, Resp: 16    Alert and Orientated x3; No Acute Distress; Appears comfortable     Hip Exam: dressing/wound is clean, dry, intact without significant drainage.  No erythema or signs of infection.     No evidence of DVT    Distal motor/sensory exam is intact in the superficial peroneal, deep peroneal and tibial nerve distributions.      Normal capillary refill with a palpable dorsalis pedis pulse.    Hemoglobin   Date Value Ref Range Status   03/04/2020 10.5 (L) 13.3 - 17.7 g/dL Final       Assessment/Plan:     1) POD # 1 S/P L Total Hip Arthroplasty  -Pain controlled  -PT/OT--- Strengthening and Gait training; moving slow with PT; delayed discharge.  -WBAT; No Hip ROM Precautions  -DVT prophylaxis  -Dispo--To home when cleared Medically and by PT; likely tomorrow  -Follow-Up in [facility] in 2 weeks  -Hospitalist co-management

## 2020-03-04 NOTE — PLAN OF CARE
Discharge Planner PT   Patient plan for discharge: Home with spouse  Current status: Patient was in bed upon PT arrival. SP at start of session was 112/60 spO2 98.  Patient required min Ax1 for help with his L LE for bed mobility and supine to sit. Performed sit to stand with CGA. Ambulated 100' with CGA without feeling dizzy or having difficulty - gait mechanics improved with longer ambulation.  Returned to chair and required SB for stand to sit. /59 following activity.  Barriers to return to prior living situation: Stairs  Recommendations for discharge: Home with spouse  Rationale for recommendations: Due to current functional status - he will need a bariatric walker upon discharge and will need to do stairs. Likely discharge tomorrow.       Entered by: Rosemarie Lacey 03/04/2020 9:18 AM

## 2020-03-04 NOTE — PLAN OF CARE
Discharge Planner OT   Patient plan for discharge: Home with wife  Current status: CGA-SBA for all ADLs except MaxA for LLB dressing/ADLs  Barriers to return to prior living situation: None from an OT perspective  Recommendations for discharge: Home with Assist from wife  Rationale for recommendations: Due to pt's current functional status       Entered by: Veronika Emanuel 03/04/2020 4:22 PM

## 2020-03-04 NOTE — PLAN OF CARE
Discharge Planner PT   Patient plan for discharge: Home with FWW  Current status: 300 feet with FWW CGA1 to SBA1, stairs 6 steps one at a time bilateral railings mina1. VIKY min assist of one with HS sets, heel slides.   Barriers to return to prior living situation: Pt was able to perform stairs to get into the house.   Recommendations for discharge: Home with outpt PT  Rationale for recommendations: Continue to build strength for mobility.        Entered by: Mita Mcnally 03/04/2020 2:06 PM

## 2020-03-04 NOTE — PROGRESS NOTES
[facility]    Hospitalist Progress Note      Assessment & Plan   Glen Rogers is a 54 year old male with history HTN, HLD, and DM2 who was admitted s/p left direct anterior total hip arthroplasty.     S/P left total hip arthroplasty:  He is doing well postoperatively. Has no complaints of chest pain or pressure. No dyspnea. He is starting to feel some pain coming on, but has not yet taken his pain medication for that. He will be on BID ASA per ortho and he plans to return to home when ready for discharge.     Hypertension:  Continue his home losartan-hydrochlorothiazide. BP well controlled    HLD:  Continue his home atorvastatin    DM2:  States that this is primarily diet controlled. Last Hgb A1C was 6.2 in January 2020. He has not checked his blood sugar or taken his metformin in over 3 months. Will check daily glucose    Diet: Advance Diet as Tolerated: Regular Diet Adult  Fluids: none    DVT Prophylaxis: ASA BID per ortho  Code Status: Full Code    Disposition: Expected discharge in 1-2 days once stable ambulating and pain controlled on oral medications    Wesjoy CROW Moran    Interval History   Lying in the bed comfortably. Does feel that he is starting to have more pain coming on, but has not yet taken any pain medications. Denies chest pain, dyspnea, chest pressure. Has no other complaints.     -Data reviewed today: I reviewed all new labs and imaging results over the last 24 hours. I personally reviewed no images or EKG's today.    Physical Exam   Temp: 99.7  F (37.6  C) Temp src: Tympanic BP: 133/71 Pulse: 81 Heart Rate: 73 Resp: 16 SpO2: 95 % O2 Device: None (Room air) Oxygen Delivery: 1/2 LPM  Vitals:    03/03/20 0715   Weight: (!) 150.6 kg (332 lb)     Vital Signs with Ranges  Temp:  [96.9  F (36.1  C)-99.7  F (37.6  C)] 99.7  F (37.6  C)  Pulse:  [66-89] 81  Heart Rate:  [68-84] 73  Resp:  [4-16] 16  BP: (121-151)/() 133/71  SpO2:  [93 %-100 %] 95 %  I/O last 3 completed shifts:  In:  1500 [I.V.:1500]  Out: 600 [Blood:600]    Peripheral IV 05/22/15 Right Hand (Active)   Number of days: 1747       Peripheral IV 03/03/20 Right Hand (Active)   Site Assessment WDL 3/3/2020  4:15 PM   Line Status Infusing 3/3/2020  4:15 PM   Phlebitis Scale 0-->no symptoms 3/3/2020  4:15 PM   Infiltration Scale 0 3/3/2020  4:15 PM   Infiltration Site Treatment Method  None 3/3/2020  7:17 AM   Extravasation? No 3/3/2020 12:11 PM   Number of days: 0       Intrathecal/Epidural Catheter 03/03/20 (Active)   Number of days: 0       Incision/Surgical Site 03/03/20 Left Hip (Active)   Incision Assessment UTV 3/3/2020  4:11 PM   Maru-Incision Assessment UTV 3/3/2020  4:11 PM   Closure Staples 3/3/2020 11:57 AM   Incision Drainage Amount UTV 3/3/2020  4:11 PM   Dressing Intervention Clean, dry, intact 3/3/2020  4:11 PM   Number of days: 0     Line/device assessment(s) completed for medical necessity    Constitutional: Alert and oriented x3. No distress    HEENT: Normocephalic/atraumatic, PERRL, EOMI, mouth clear, neck supple, no LN.     Cardiovascular: RRR. No murmur, no  rubs, or gallops.      Respiratory: Clear to auscultation bilaterally    Abdomen: Soft, nontender, nondistended, no organomegaly. Bowel sounds present    Extremities: Warm/dry. No edema. Dressing to left hip clean, dry and intact    Neuro:  Non focal, cranial nerves intact, Moves all extremities.    Medications     sodium chloride 100 mL/hr at 03/03/20 1237       acetaminophen  975 mg Oral Q8H     [START ON 3/4/2020] aspirin  325 mg Oral BID     atorvastatin  10 mg Oral Daily     ceFAZolin  3 g Intravenous Q8H     losartan  50 mg Oral Daily    And     hydrochlorothiazide  12.5 mg Oral Daily     polyethylene glycol  17 g Oral Daily     senna-docusate  2 tablet Oral BID     sodium chloride (PF)  3 mL Intracatheter Q8H       Data   No lab results found in last 7 days.    Recent Results (from the past 24 hour(s))   XR Surgery VAL Fluoro L/T 5 Min w Stills     Narrative    PROCEDURE: XR SURGERY VAL FLUORO LESS THAN 5 MIN W STILLS 3/3/2020  10:16 AM    HISTORY: direct anterior left total hip arthroplasty    COMPARISONS: None.    TECHNIQUE: 3 fluoroscopic images. Fluoroscopy time 0.6.    FINDINGS: Images were obtained during left hip arthroplasty.  Acetabular and femoral components are visualized.         Impression    IMPRESSION: Left hip arthroplasty.    HECTOR KEARNEY MD   XR Pelvis Port 1/2 Views    Narrative    PROCEDURE: XR PELVIS PORT 1/2 VW 3/3/2020 10:59 AM    HISTORY: Status Post Left VIKY    COMPARISONS: Fluoroscopy of same date.    TECHNIQUE: Single postoperative view.    FINDINGS: There is a left hip arthroplasty with components in  satisfactory position.    Moderate degenerative changes seen in the right hip.         Impression    IMPRESSION: Left VIKY.    HECTOR KEARNEY MD

## 2020-03-04 NOTE — PLAN OF CARE
Face to face report given with opportunity to observe patient.    Report given to MAYRA Singh RN   3/4/2020  3:33 PM

## 2020-03-04 NOTE — PLAN OF CARE
Reason for hospital stay:  Left total hip arthroplasty - operative day     Living situation PTA: Home    Most recent vitals: /71   Pulse 81   Temp 99.7  F (37.6  C) (Tympanic)   Resp 16   Ht 1.829 m (6')   Wt (!) 150.6 kg (332 lb)   SpO2 95%   BMI 45.03 kg/m      Pain Management:  Complained of left hip pain and spasms - gave PO oxy and PO tylenol and PO flexeril with little pain relief. Updated MD and received order for IV toradol - gave per orders with effective pain control. Also using ice packs.    LOC:  A+O x4 - calls appropriately and makes needs known.     Cardiac:  WDL    Respiratory:  WDL - sats 95% on RA    GI:  WDL    :  WDL - using urinal - denies any issues voiding    Skin Issues:  Aquacel dressing to left hip clean, dry, intact. No shadowing or signs of bleeding noted. CMS remains intact.     IVF:  SL    ABX:  Continues on IV Ancef    Activity: Assist 2 with gait belt and walker. Up in chair for supper.     Face to face report given with opportunity to observe patient.    Report given to Hannah Le RN   3/3/2020  11:11 PM

## 2020-03-04 NOTE — PROGRESS NOTES
[facility]    Hospitalist Progress Note      Assessment & Plan   Glen Rogers is a 54 year old male with history HTN, HLD, and DM2 who was admitted s/p left direct anterior total hip arthroplasty.     S/P left total hip arthroplasty POD #1:  Pain control issues.  He is otherwise doing well postoperatively.  He will be on BID ASA per ortho and he plans to return to home when ready for discharge.     Hypertension:  Continue his home losartan-hydrochlorothiazide. BP well controlled    HLD:  Continue his home atorvastatin    DM2:  States that this is primarily diet controlled. Last Hgb A1C was 6.2 in January 2020. He has not checked his blood sugar or taken his metformin in over 3 months. Will check daily glucose    Diet: Advance Diet as Tolerated: Regular Diet Adult  Diet  Fluids: none    DVT Prophylaxis: ASA BID per ortho  Code Status: Full Code    Disposition: Expected discharge likely tomorrow and pain controlled on oral medications    Jena Cheney MD      Interval History   Lying in the bed comfortably. Does feel that he is starting to have more pain coming on, but has not yet taken any pain medications. Denies chest pain, dyspnea, chest pressure. Has no other complaints.     -Data reviewed today: I reviewed all new labs and imaging results over the last 24 hours. I personally reviewed no images or EKG's today.    Physical Exam   Temp: 97.6  F (36.4  C) Temp src: Temporal BP: 108/58 Pulse: 71 Heart Rate: 74 Resp: 16 SpO2: 100 % O2 Device: None (Room air) Oxygen Delivery: 1/2 LPM  Vitals:    03/03/20 0715   Weight: (!) 150.6 kg (332 lb)     Vital Signs with Ranges  Temp:  [96.9  F (36.1  C)-99.7  F (37.6  C)] 97.6  F (36.4  C)  Pulse:  [66-89] 71  Heart Rate:  [68-84] 74  Resp:  [4-16] 16  BP: (104-151)/() 108/58  SpO2:  [93 %-100 %] 100 %  I/O last 3 completed shifts:  In: 3137 [P.O.:720; I.V.:2417]  Out: 2350 [Urine:1750; Blood:600]    Peripheral IV 05/22/15 Right Hand (Active)   Number of  days: 1748       Peripheral IV 03/03/20 Right Hand (Active)   Site Assessment WDL 3/4/2020  8:30 AM   Line Status Saline locked 3/4/2020  8:30 AM   Phlebitis Scale 0-->no symptoms 3/4/2020  8:30 AM   Infiltration Scale 0 3/4/2020  8:30 AM   Infiltration Site Treatment Method  None 3/3/2020  7:17 AM   Extravasation? No 3/3/2020 12:11 PM   Number of days: 1       Intrathecal/Epidural Catheter 03/03/20 (Active)   Number of days: 1       Incision/Surgical Site 03/03/20 Left Hip (Active)   Incision Assessment UTV 3/4/2020  8:30 AM   Maru-Incision Assessment UTV 3/4/2020  8:30 AM   Closure KISHA 3/4/2020  8:30 AM   Incision Drainage Amount UTV 3/4/2020  8:30 AM   Dressing Intervention Clean, dry, intact 3/4/2020  8:30 AM   Number of days: 1     Line/device assessment(s) completed for medical necessity    Constitutional: Alert and oriented x3. No distress  HEENT: Normocephalic/atraumatic, PERRL, EOMI, mouth clear, neck supple, no LN.  Cardiovascular: RRR. No murmur, no  rubs, or gallops.    Respiratory: Clear to auscultation bilaterally  Abdomen: Soft, nontender, nondistended, no organomegaly. Bowel sounds present  Extremities: Warm/dry. No edema. Dressing to left hip clean, dry and intact  Neuro:  Non focal, cranial nerves intact, Moves all extremities.      Medications     sodium chloride Stopped (03/03/20 2135)       acetaminophen  975 mg Oral Q8H     aspirin  325 mg Oral BID     atorvastatin  10 mg Oral Daily     losartan  50 mg Oral Daily    And     hydrochlorothiazide  12.5 mg Oral Daily     polyethylene glycol  17 g Oral Daily     senna-docusate  2 tablet Oral BID     sodium chloride (PF)  3 mL Intracatheter Q8H       Data   Recent Labs   Lab 03/04/20  0505   HGB 10.5*   *       Recent Results (from the past 24 hour(s))   XR Surgery VAL Fluoro L/T 5 Min w Stills    Narrative    PROCEDURE: XR SURGERY VAL FLUORO LESS THAN 5 MIN W STILLS 3/3/2020  10:16 AM    HISTORY: direct anterior left total hip  arthroplasty    COMPARISONS: None.    TECHNIQUE: 3 fluoroscopic images. Fluoroscopy time 0.6.    FINDINGS: Images were obtained during left hip arthroplasty.  Acetabular and femoral components are visualized.         Impression    IMPRESSION: Left hip arthroplasty.    HECTOR KEARNEY MD   XR Pelvis Port 1/2 Views    Narrative    PROCEDURE: XR PELVIS PORT 1/2 VW 3/3/2020 10:59 AM    HISTORY: Status Post Left VIKY    COMPARISONS: Fluoroscopy of same date.    TECHNIQUE: Single postoperative view.    FINDINGS: There is a left hip arthroplasty with components in  satisfactory position.    Moderate degenerative changes seen in the right hip.         Impression    IMPRESSION: Left VIKY.    HECTOR KEARNEY MD

## 2020-03-04 NOTE — PLAN OF CARE
Patient aware FMLA forms are completed, patient would like to pick these up, upfront for her.    Reason for hospital stay:  L total hip 3/3/20    Most recent vitals: /57   Pulse 81   Temp 97.9  F (36.6  C) (Tympanic)   Resp 16   Ht 1.829 m (6')   Wt (!) 150.6 kg (332 lb)   SpO2 99%   BMI 45.03 kg/m   BP has been in the 100's systolic all night. Pt doesn't feel dizzy and shows no s/s of hypotension.   Pain Management:  Pain has been max of 6/10 this shift otherwise pt states pain meds have made his pain down to a 2-3/10. Simi 10mg given x 3 this shift. Also, received Flexeril for muscle spasms with adequate relief.  Scheduled tylenol was also given this shift. Ice was also placed multiple times this shift and tolerated well.   Orientation:  Alert and oriented x 4. Pt awakens easily.   Cardiac:  Heart rate is in the 70's all night. Apical pulse is regular.   Respiratory:  Lungs are clear and uses IS up to 4000 without difficulty. Encouraged to cough and deep breathe when not using IS. On continuous pulse ox all night and on room air pt's sats were in high 90's.  GI:  Bowel sounds are hypoactive and pt states he's not passing an flatus yet. While giving the final dose of simi on my shift pt states he feels like his belly is waking up although not passing any flatus yet per pt.   :  Voided once on this shift. Urine was álvaro in color although clear. Encouraged pt to drink more water.   Diet: Water drank this shift and tolerated with no nausea.   Skin Issues:  Left Hip has a aquacell dressing that has been CDI with no shadowing this shift.   IVF:  Saline locked without any issues.   ABX:  Last dose of ancef hung this shift without any issues   Mobility:  Pt got up to the side of the bed with little issues and tolerated movement well. No dizziness noted when moving or up.  Safety:  Call light within reach and uses with any needs. Free from falls/injury this shift.     Comments: BS this AM POD # 1 is 148. Good CMS all shift in Left leg.     3/4/2020  7:51 AM  Hannah Arellano, RN      Face to face report  given with opportunity to observe patient.    Report given to Divine Arellano RN   3/4/2020  8:01 AM

## 2020-03-04 NOTE — PROGRESS NOTES
Inpatient Occupational Therapy Evaluation    Name: Glen Rogers MRN# [id number]   Age: 54 year old YOB: 1965     Date of Consultation: 2020  Consultation is requested by:  Dr. Bryant  Specific Consultation Request:  Post-op Total Hip Evaluation  Primary care provider: Zak Schrader    General Information:   Onset Date: 3/3/2020    History of Current Problem/Admission: Osteoarthritis of left hip, unspecified osteoarthritis type [M16.12]    Prior Level of Function: IND with some difficulties due to L hip pain  Ambulation: 0 - Independent   Transferrin - Independent   Toiletin - Independent    Bathin - Independent   Dressin - Independent   Eatin - Independent   Communication: 0 - Understands/communicates without difficulty  Swallowin - swallows foods/liquids without difficulty  Cognition: 0 - no cognitive issues reported    Fall history within the last 6 months: Yes    Current Living Situation: Pt lives with his spouse with 5 steps to enter home. Split entry with 5 steps up to upper level, where pt now will be sleeping. Bathroom has shower/tub combo with something to hold onto but not actually a grab bar and a regular height toilet with the counter on one side and the window sill on the other.     Current Equipment Used at Home: none     Patient & Family Goals: to return home safely     Past Medical History:   Past Medical History:   Diagnosis Date     Diabetes mellitus, type 2 (H) 3/18/2015     Essential hypertension 2015     FH: colon cancer     father     HTN (hypertension)     borderline     Obesity      GIULIANA on CPAP      PONV (postoperative nausea and vomiting)      Type 2 diabetes mellitus without complication (H) 2015       Past Surgical History:  Past Surgical History:   Procedure Laterality Date     ARTHROPLASTY HIP ANTERIOR Left 3/3/2020    Procedure: DIRECT ANTERIOR LEFT TOTAL HIP ARTHROPLASTY;  Surgeon: Can Bryant MD;  Location: [facility] OR      COLONOSCOPY N/A 2015    Repeat in ??/Procedure: COLONOSCOPY;  Surgeon: Ousmane Eddy DO;  Location: [facility] OR     ENT SURGERY      tonsillectomy       Medications:   Current Facility-Administered Medications   Medication     [START ON 3/6/2020] acetaminophen (TYLENOL) tablet 650 mg     acetaminophen (TYLENOL) tablet 975 mg     aspirin (ASA) EC tablet 325 mg     atorvastatin (LIPITOR) tablet 10 mg     benzocaine-menthol (CEPACOL) 15-3.6 MG lozenge 1-2 lozenge     bisacodyl (DULCOLAX) Suppository 10 mg     cyclobenzaprine (FLEXERIL) tablet 10 mg     losartan (COZAAR) tablet 50 mg    And     hydrochlorothiazide (MICROZIDE) capsule 12.5 mg     HYDROmorphone (PF) (DILAUDID) injection 0.2 mg     hydrOXYzine (ATARAX) tablet 25 mg     ketorolac (TORADOL) injection 30 mg     lidocaine (LMX4) kit     lidocaine 1 % 0.1-1 mL     ondansetron (ZOFRAN-ODT) ODT tab 4 mg    Or     ondansetron (ZOFRAN) injection 4 mg     oxyCODONE (ROXICODONE) tablet 5-10 mg     polyethylene glycol (MIRALAX) Packet 17 g     senna-docusate (SENOKOT-S/PERICOLACE) 8.6-50 MG per tablet 2 tablet     sodium chloride (PF) 0.9% PF flush 3 mL     sodium chloride (PF) 0.9% PF flush 3 mL     sodium chloride 0.9% infusion       Weight Bearing Status: WBAT     Cognitive Status Examination:  Orientation: awake and alert  Level of Consciousness: alert  Follows Commands and Answers Questions: 100% of the time  Personal Safety and Judgement: Intact  Memory: Immediate recall intact and Long-term memory intact    Pain:   Currently in pain? Yes  Pain Location? left hip  Pain Ratin    Occupational Therapy Evaluation:   Integumentary/Edema: no significant findings   Range of Motion: Functional    Strength: Functionl  Muscle Tone Assessment: no issues  Coordination: normal    Mobility:   Transfer Skills: CGA-SBA sit-stand from chair  Toilet Transfer: CGA-SBA with heavy use of grab bar- discussed different options for home set up due to regular height toilet  and no actual grab bar   Balance: no LOB     ADLs:   Lower Body Dressing: MaxA - spouse can assist  Toileting: IND  Grooming: SBA standing at the sink to wash his hands    BP before activity and then again after was 118/59. Pt denied dizziness/light headedness     IADLs:   Previous Responsibilities of the Patient: Meal Prep, Laundry, Shopping, Yard Work, Driving  and Work   Comments: wife will complete any task pt cannot do. Wife works from home     Activities of Daily Living Analysis:   Impairments Contributing to Impaired Activities of Daily Living: pain, L hip ROM decreased  and activity tolerance decreased    Occupational Therapy Interventions: ADL Retraining  and progressive activity/exercise    Clinical Impressions:  Criteria for Skilled Therapeutic Intervention Met: Yes, treatment indicated  OT Diagnosis: impaired ADLs s/p L VIKY  Influenced by the following impairments: Pain, decreased L hip ROM, decreased activity tolerance  Functional limitations due to impairment: LB ADLs, functional mobility, IADLs  Clinical presentation: Stable/Uncomplicated  Clinical presentation rationale: clinical judgement  Clinical Decision making (complexity): Low Complexity  Frequency: 5 times/week  Predicted Duration of Therapy Intervention (days/wks): 5 days  Anticipated Discharge Disposition: Home with Assist  Anticipated Equipment Needs at Discharge: none - discussed bathroom equipment with pt and wife  Risks and Benefits of therapy have been explained: Yes  Patient, Family & other staff in agreement with plan of care: Yes  Clinical Impression Comments: Pt doing fair 1 day s/p LTHA. He overall tolerated ADLs with SBA-CGA. No issues with BP. Pt unable to perform LLB ADLs but wife can assist. Discussed pt's bathroom set up in great detail today, and provided education in regards to available equipment. No concerns with pt returning home with wife's assistance but pt will benefit from ongoing skilled OT intervention during his  stay to progress his overall strength and endurance for increased independence and safety in ADLs and further discuss questions/concerns in regards to bathroom set up/ADLs.     Total Eval Time: 20

## 2020-03-04 NOTE — PLAN OF CARE
Reason for hospital stay:  left hip replacement    Most recent vitals: /59   Pulse 71   Temp 97.6  F (36.4  C) (Temporal)   Resp 16   Ht 1.829 m (6')   Wt (!) 150.6 kg (332 lb)   SpO2 100%   BMI 45.03 kg/m    Pain Management:  C/o left hip incision pain, administered scheduled Tylenol, Tracie 10 mg with minimal decrease, and IV Toradol with better effect in decrease.  Orientation:  A&O  Cardiac:  HR reg. AM /58 this am, held Cozaar and Microzide per Dr Cheney via care rounds  Respiratory:  LS clear. Denies sob  GI:  BS active. Denies nausea. Passing gas  :  Voiding without difficulty  Diet: reg diet.  and 125. Wife brought in breakfast  Skin Issues:  Left hip incision covered with AquaCell, CDI. Good CMS, palpable pulse, no numbness/tingling  IVF:  IV SL  ABX:  Ancef completed  Mobility:  A1-2 with the walker and gait belt  Safety:  Call light within reach.    Comments: Worked with PT/OT this shift  3/4/2020  1:32 PM  Divine Chapa RN

## 2020-03-04 NOTE — DISCHARGE INSTRUCTIONS
Appointments:     *You have a Hospital Follow-up appointment scheduled for Tuesday March 17th at 12:45 with Dr. Bryant at [facility] if you need to reschedule please call [phone]

## 2020-03-05 ENCOUNTER — APPOINTMENT (OUTPATIENT)
Dept: PHYSICAL THERAPY | Facility: HOSPITAL | Age: 55
End: 2020-03-05
Attending: ORTHOPAEDIC SURGERY
Payer: COMMERCIAL

## 2020-03-05 VITALS
RESPIRATION RATE: 18 BRPM | OXYGEN SATURATION: 95 % | SYSTOLIC BLOOD PRESSURE: 129 MMHG | HEART RATE: 105 BPM | WEIGHT: 315 LBS | DIASTOLIC BLOOD PRESSURE: 60 MMHG | BODY MASS INDEX: 42.66 KG/M2 | TEMPERATURE: 98.6 F | HEIGHT: 72 IN

## 2020-03-05 LAB
GLUCOSE SERPL-MCNC: 127 MG/DL (ref 70–99)
HGB BLD-MCNC: 10.1 G/DL (ref 13.3–17.7)

## 2020-03-05 PROCEDURE — 82947 ASSAY GLUCOSE BLOOD QUANT: CPT | Performed by: ORTHOPAEDIC SURGERY

## 2020-03-05 PROCEDURE — 85018 HEMOGLOBIN: CPT | Performed by: ORTHOPAEDIC SURGERY

## 2020-03-05 PROCEDURE — 99231 SBSQ HOSP IP/OBS SF/LOW 25: CPT | Mod: 24 | Performed by: INTERNAL MEDICINE

## 2020-03-05 PROCEDURE — 97530 THERAPEUTIC ACTIVITIES: CPT | Mod: GP

## 2020-03-05 PROCEDURE — 36415 COLL VENOUS BLD VENIPUNCTURE: CPT | Performed by: ORTHOPAEDIC SURGERY

## 2020-03-05 PROCEDURE — 25000132 ZZH RX MED GY IP 250 OP 250 PS 637: Performed by: ORTHOPAEDIC SURGERY

## 2020-03-05 PROCEDURE — 40000185 ZZHC STATISTIC PT OUTPT VISIT: Performed by: PHYSICAL THERAPIST

## 2020-03-05 RX ADMIN — OXYCODONE HYDROCHLORIDE 10 MG: 5 TABLET ORAL at 12:00

## 2020-03-05 RX ADMIN — OXYCODONE HYDROCHLORIDE 10 MG: 5 TABLET ORAL at 04:33

## 2020-03-05 RX ADMIN — POLYETHYLENE GLYCOL 3350 17 G: 17 POWDER, FOR SOLUTION ORAL at 08:59

## 2020-03-05 RX ADMIN — ASPIRIN 325 MG: 325 TABLET, DELAYED RELEASE ORAL at 08:59

## 2020-03-05 RX ADMIN — ACETAMINOPHEN 975 MG: 325 TABLET, FILM COATED ORAL at 04:33

## 2020-03-05 RX ADMIN — CYCLOBENZAPRINE 10 MG: 10 TABLET, FILM COATED ORAL at 09:02

## 2020-03-05 RX ADMIN — SENNOSIDES AND DOCUSATE SODIUM 2 TABLET: 8.6; 5 TABLET ORAL at 08:59

## 2020-03-05 RX ADMIN — HYDROCHLOROTHIAZIDE 12.5 MG: 12.5 CAPSULE ORAL at 08:59

## 2020-03-05 RX ADMIN — ATORVASTATIN CALCIUM 10 MG: 10 TABLET, FILM COATED ORAL at 08:59

## 2020-03-05 RX ADMIN — OXYCODONE HYDROCHLORIDE 10 MG: 5 TABLET ORAL at 08:59

## 2020-03-05 RX ADMIN — LOSARTAN POTASSIUM 50 MG: 50 TABLET, FILM COATED ORAL at 08:59

## 2020-03-05 ASSESSMENT — ACTIVITIES OF DAILY LIVING (ADL)
ADLS_ACUITY_SCORE: 15
ADLS_ACUITY_SCORE: 13
ADLS_ACUITY_SCORE: 15
ADLS_ACUITY_SCORE: 13

## 2020-03-05 NOTE — PLAN OF CARE
Occupational Therapy Discharge Summary    Reason for therapy discharge:    Discharged to home.    Progress towards therapy goal(s). See goals on Care Plan in [facility] electronic health record for goal details.  Goals not met.  Barriers to achieving goals:   discharge from facility.    Therapy recommendation(s):    No further skilled OT recommended at this time.

## 2020-03-05 NOTE — PLAN OF CARE
"Physical Therapy Discharge Summary    Reason for therapy discharge:    Discharged to home.    Progress towards therapy goal(s). See goals on Care Plan in [facility] electronic health record for goal details.  Goals met    Therapy recommendation(s):    Additional PT will be determined by Dr. Bryant.  At time of discharge there was discrepancy about appropriate sized equipment.  Clarified with [employer] that pt's weight would need to be 350lbs to qualify for bariatric walker.  Spoke with pt to explain and pt understanding of this and did not want to pay out of pocket for bariatric so went with standard and was issued one for home.  Upon returning to department assessed weight limit of standard walker which is 300lb.  Called [employer] back to again clarify weight to qualify for bariatric yet explained that standard only goes to 300lbs.  Representative did some checking and returned to phone stating \"we are going to go with 300lb then\".  They stated pt could come to their facility and they would assist in getting appropriate equipment.  Pt had already been discharged from facility so called pt on cell phone to explain situation.  Discussed weight limit of 300lb and fact that pt was over that.  Pt states he is fine with the walker he has and does not want to exchange it.  Did encourage him to call [employer] if he was to change his mind.         "

## 2020-03-05 NOTE — PLAN OF CARE
Reason for hospital stay:  L VIKY    Most recent vitals: /70   Pulse 105   Temp 99.9  F (37.7  C)   Resp 16   Ht 1.829 m (6')   Wt (!) 150.6 kg (332 lb)   SpO2 94%   BMI 45.03 kg/m    Pain Management:  Scheduled Tylenol et Oxy given for pain rated 8/10 to L hip  Orientation:  A/O  Cardiac:  WDL  Respiratory:  Lung sounds clear throughout  GI:  Bowel sounds audible et active x4  :  WDL  Skin Issues:  Aquacel dressing CDI, no shadowing noted  IVF:  Saline locked  ABX:  n/a  Mobility:  Independently repositioning in bed  Safety:  A/O calls for assist appropriately     Comments:    3/5/2020  5:00 AM  Lucille Duran RN  Face to face report given with opportunity to observe patient.    Report given to Vivien Duran RN   3/5/2020  7:28 AM

## 2020-03-05 NOTE — PROGRESS NOTES
[facility]    Hospitalist Progress Note      Assessment & Plan   Glen Rogers is a 54 year old male with history HTN, HLD, and DM2 who was admitted s/p left direct anterior total hip arthroplasty.     S/P left total hip arthroplasty POD #2:  Pain control issues.  He is otherwise doing well postoperatively.  He will be on BID ASA per ortho and he plans to return to home when ready for discharge.     Hypertension:  Continue his home losartan-hydrochlorothiazide. BP well controlled    HLD:  Continue his home atorvastatin    DM2:  States that this is primarily diet controlled. Last Hgb A1C was 6.2 in January 2020. He has not checked his blood sugar or taken his metformin in over 3 months. Will check daily glucose    Diet: Advance Diet as Tolerated: Regular Diet Adult  Diet  Fluids: none    DVT Prophylaxis: ASA BID per ortho  Code Status: Full Code    Disposition: Patient medically stable for discharge.    Jena Cheney MD      Interval History   Patient seen in room.  Is complaining of soreness and pain in his left leg.  He denies any chest pain, shortness of breath, abdominal pain, nausea.  Otherwise doing well.  He is eating well.    -Data reviewed today: I reviewed all new labs and imaging results over the last 24 hours. I personally reviewed no images or EKG's today.    Physical Exam   Temp: 98.6  F (37  C) Temp src: Tympanic BP: 129/60 Pulse: 105 Heart Rate: 100 Resp: 18 SpO2: 95 % O2 Device: None (Room air)    Vitals:    03/03/20 0715   Weight: (!) 150.6 kg (332 lb)     Vital Signs with Ranges  Temp:  [97.6  F (36.4  C)-100.9  F (38.3  C)] 98.6  F (37  C)  Pulse:  [105] 105  Heart Rate:  [] 100  Resp:  [16-18] 18  BP: (118-129)/(59-70) 129/60  SpO2:  [94 %-99 %] 95 %  I/O last 3 completed shifts:  In: 840 [P.O.:840]  Out: 500 [Urine:500]    Peripheral IV 05/22/15 Right Hand (Active)   Number of days: 1749       Peripheral IV 03/03/20 Right Hand (Active)   Site Assessment [facility] 3/5/2020  8:04  AM   Line Status Saline locked 3/5/2020  8:04 AM   Phlebitis Scale 0-->no symptoms 3/5/2020  8:04 AM   Infiltration Scale 0 3/5/2020  4:00 AM   Infiltration Site Treatment Method  None 3/3/2020  7:17 AM   Extravasation? No 3/3/2020 12:11 PM   Number of days: 2       Incision/Surgical Site 03/03/20 Left Hip (Active)   Incision Assessment UTV 3/5/2020  4:28 AM   Maru-Incision Assessment UTV 3/5/2020  4:28 AM   Closure KISHA 3/5/2020  4:28 AM   Incision Drainage Amount UTV 3/5/2020  4:28 AM   Dressing Intervention Clean, dry, intact 3/5/2020  4:28 AM   Number of days: 2     Line/device assessment(s) completed for medical necessity    Constitutional: Alert and oriented x3. No distress  HEENT: Normocephalic/atraumatic, PERRL, EOMI, mouth clear, neck supple, no LN.  Cardiovascular: RRR. No murmur, no  rubs, or gallops.    Respiratory: Clear to auscultation bilaterally  Abdomen: Soft, nontender, nondistended, no organomegaly. Bowel sounds present  Extremities: Warm/dry. No edema. Dressing to left hip clean, dry and intact  Neuro:  Non focal, cranial nerves intact, Moves all extremities.      Medications     sodium chloride Stopped (03/03/20 2135)       acetaminophen  975 mg Oral Q8H     aspirin  325 mg Oral BID     atorvastatin  10 mg Oral Daily     losartan  50 mg Oral Daily    And     hydrochlorothiazide  12.5 mg Oral Daily     polyethylene glycol  17 g Oral Daily     senna-docusate  2 tablet Oral BID     sodium chloride (PF)  3 mL Intracatheter Q8H       Data   Recent Labs   Lab 03/05/20  0507 03/04/20  0505   HGB 10.1* 10.5*   * 148*       No results found for this or any previous visit (from the past 24 hour(s)).

## 2020-03-05 NOTE — PLAN OF CARE
"Reason for hospital stay:  Left Total Hip Arthroplasty - POD #1    Most recent vitals: /61   Pulse 105   Temp 99.5  F (37.5  C) (Tympanic)   Resp 16   Ht 1.829 m (6')   Wt (!) 150.6 kg (332 lb)   SpO2 99%   BMI 45.03 kg/m      Pain / Fever Management:  Complains of left hip pain and spasms. Gave PO Oxy, PO Atarax, PO Flexeril, and IV Toradol with effective pain control. Stated \"oxy doesn't seem to help very much but the Toradol works much better.\" Stated \"advil has always worked better than oxy in the past.\"    LOC:  A+O x4 - calls appropriately and makes needs known.    Cardiac:  WDL    Respiratory:  Lungs clear - sats 99% on RA    GI:  Poor appetite - declined to order anything for supper -  denied any nausea and is passing flatus. Taking in adequate PO fluids.     :  WDL    Skin Issues:  Aquacel dressing to left hip remains clean, dry, intact. No shadowing or signs of bleeding noted. No signs or symptoms of infection noted. CMS remains intact.     IVF:  SL    Activity: Assist 1 with gait belt and walker.       Face to face report given with opportunity to observe patient.    Report given to Lucille Le RN   3/5/2020  12:17 AM      "

## 2020-03-05 NOTE — PLAN OF CARE
Discharge Planner PT   Patient plan for discharge: Home w/ wife   Current status: SBA for ambulation up to 400' w/ FWW and steps x 8  Barriers to return to prior living situation: none  Recommendations for discharge: Discharge home to follow up w/ Dr. Bryant and start PT if appropriate  Rationale for recommendations: based on SBA status for all transfers       Entered by: Ousmane Hernandez 03/05/2020 11:01 AM

## 2020-03-17 ENCOUNTER — TRANSFERRED RECORDS (OUTPATIENT)
Dept: HEALTH INFORMATION MANAGEMENT | Facility: CLINIC | Age: 55
End: 2020-03-17

## 2020-03-17 NOTE — DISCHARGE SUMMARY
Date of Admission: 3/3/20    Date of Discharge: 3/5/20    Admitting Physician: Can Bryant MD    Consultations: Hospitalist Service    Admitting Diagnosis: Left Hip Osteoarthritis    Discharge Diagnosis: Left Hip Osteoarthritis    Surgical Procedures Performed: Left Direct Anterior VIKY    Hospital Complications: None    Discharge Medications: No Changes to home meds; Oxycodone 5-10 mg po every 4 hours added for pain control;  BID for DVT prophylaxis    Discharge Disposition: Home    Discharge Diet: As at home    Discharge Activity: WBAT Left Lower Extremity; No Hip ROM precautions    Hospital Course:   The patient underwent a Left Direct Anterior VIKY.  There were no intraoperative or immediate post operative complications.  Once stable in the PACU they were transferred to the hospital floor where they remained for the remainder of the hospital stay.  Pain management transitioned from IV to oral pain medications.  Physical/Occupational Therapy was consulted for early mobility and gait training as well as ADL training.  DVT prophylaxis was initiated on POD #1.  Vital signs and hemoglobin remained stable. The Hospitalist team was consulted for management of medical co-morbidities.  At time of discharge the patient was medically stable and cleared by Therapy for safe discharge home.        Follow-Up: 10-14 days [facility]    Questions: Call [phone] or [phone]

## 2020-04-14 ENCOUNTER — TRANSFERRED RECORDS (OUTPATIENT)
Dept: HEALTH INFORMATION MANAGEMENT | Facility: CLINIC | Age: 55
End: 2020-04-14

## 2020-05-20 ENCOUNTER — TRANSFERRED RECORDS (OUTPATIENT)
Dept: HEALTH INFORMATION MANAGEMENT | Facility: CLINIC | Age: 55
End: 2020-05-20

## 2020-07-28 NOTE — PROGRESS NOTES
Subjective     Glen Rogers is a 54 year old male who presents to clinic today for the following health issues:    HPI       Diabetes Follow-up    How often are you checking your blood sugar? A few times a week  What time of day are you checking your blood sugars (select all that apply)?  usually in  morning or right before bed   Have you had any blood sugars above 200?  No  Have you had any blood sugars below 70?  No    What symptoms do you notice when your blood sugar is low?  None    What concerns do you have today about your diabetes? None     Do you have any of these symptoms? (Select all that apply)  No numbness or tingling in feet.  No redness, sores or blisters on feet.  No complaints of excessive thirst.  No reports of blurry vision.  No significant changes to weight.    Have you had a diabetic eye exam in the last 12 months? No        BP Readings from Last 2 Encounters:   08/10/20 (!) 142/84   03/05/20 129/60     Hemoglobin A1C (%)   Date Value   01/15/2020 6.2 (H)   07/15/2019 6.2 (H)     LDL Cholesterol Calculated (mg/dL)   Date Value   01/15/2020 115 (H)   01/14/2019 101 (H)           How many servings of fruits and vegetables do you eat daily?  0-1    On average, how many sweetened beverages do you drink each day (Examples: soda, juice, sweet tea, etc.  Do NOT count diet or artificially sweetened beverages)?   0    How many days per week do you exercise enough to make your heart beat faster? 3 or less    How many minutes a day do you exercise enough to make your heart beat faster? 20 - 29  How many days per week do you miss taking your medication? 2    What makes it hard for you to take your medications?  remembering to take    Musculoskeletal problem/pain      Duration: 5/6 years     Description  Location: left hip     Intensity:  mild    Accompanying signs and symptoms: nerve pain, scar tissue. Pain starting  in buttocks region , than gets shooting pain up his back to about mid back.      History  Previous similar problem: YES  Previous evaluation:  orthopedic evaluation    Precipitating or alleviating factors:  Trauma or overuse: YES  Aggravating factors include: bending, sitting , changing position .     Therapies tried and outcome: ice, stretching, exercises, physical therapy and had total hip replacement in march.     Healing still from VIKY 3/3/20    Still has some soft tissue pain    Was anemic    Pt is on Voltaran - helps    Has been stretching /HEP and did get PT    Overall improved     Main issue is pain in left buttocks           Current Outpatient Medications   Medication Sig Dispense Refill     atorvastatin (LIPITOR) 10 MG tablet Take 1 tablet (10 mg) by mouth daily 90 tablet 2     diclofenac (VOLTAREN) 75 MG EC tablet Take 75 mg by mouth 2 times daily       hydrochlorothiazide (HYDRODIURIL) 12.5 MG tablet        losartan (COZAAR) 50 MG tablet        metFORMIN (GLUCOPHAGE-XR) 500 MG 24 hr tablet Take 2 tablets (1,000 mg) by mouth daily (with dinner) 180 tablet 2     acetaminophen (TYLENOL) 325 MG tablet Take 2 tablets (650 mg) by mouth every 4 hours as needed for mild pain (Patient not taking: Reported on 8/10/2020) 60 tablet 0     losartan-hydrochlorothiazide (HYZAAR) 50-12.5 MG tablet Take 1 tablet by mouth daily (Patient not taking: Reported on 8/10/2020) 90 tablet 2     naproxen sodium (ANAPROX) 220 MG tablet Take 220 mg by mouth daily as needed in the afternoon       oxyCODONE (ROXICODONE) 5 MG tablet Take 1-2 tablets (5-10 mg) by mouth every 3 hours as needed for moderate to severe pain (Patient not taking: Reported on 8/10/2020) 30 tablet 0     Allergies   Allergen Reactions     Morphine Hives     Zocor [Simvastatin] Muscle Pain (Myalgia)       Reviewed and updated as needed this visit by Provider         Review of Systems   Constitutional, HEENT, cardiovascular, pulmonary, gi and gu systems are negative, except as otherwise noted.      Objective    /80   Pulse 77   Temp  97.9  F (36.6  C)   Wt (!) 152 kg (335 lb)   SpO2 98%   BMI 45.43 kg/m    Body mass index is 45.43 kg/m .  Physical Exam   GENERAL: healthy, alert and no distress  NECK: no adenopathy, no asymmetry, masses, or scars and thyroid normal to palpation  RESP: lungs clear to auscultation - no rales, rhonchi or wheezes  CV: regular rate and rhythm, normal S1 S2, no S3 or S4, no murmur, click or rub, no peripheral edema and peripheral pulses strong  ABDOMEN: soft, nontender, no hepatosplenomegaly, no masses and bowel sounds normal  MS: no gross musculoskeletal defects noted, no edema  PSYCH: mentation appears normal, affect normal/bright  Back - unremarkable but pain in left buttocks /sciatic notch region   Results for orders placed or performed in visit on 08/10/20   Basic metabolic panel     Status: Abnormal   Result Value Ref Range    Sodium 138 133 - 144 mmol/L    Potassium 3.8 3.4 - 5.3 mmol/L    Chloride 108 94 - 109 mmol/L    Carbon Dioxide 27 20 - 32 mmol/L    Anion Gap 3 3 - 14 mmol/L    Glucose 140 (H) 70 - 99 mg/dL    Urea Nitrogen 22 7 - 30 mg/dL    Creatinine 0.87 0.66 - 1.25 mg/dL    GFR Estimate >90 >60 mL/min/[1.73_m2]    GFR Estimate If Black >90 >60 mL/min/[1.73_m2]    Calcium 9.0 8.5 - 10.1 mg/dL   CBC with platelets differential     Status: None   Result Value Ref Range    WBC 7.0 4.0 - 11.0 10e9/L    RBC Count 4.82 4.4 - 5.9 10e12/L    Hemoglobin 13.7 13.3 - 17.7 g/dL    Hematocrit 40.5 40.0 - 53.0 %    MCV 84 78 - 100 fl    MCH 28.4 26.5 - 33.0 pg    MCHC 33.8 31.5 - 36.5 g/dL    RDW 14.7 10.0 - 15.0 %    Platelet Count 233 150 - 450 10e9/L    Diff Method Automated Method     % Neutrophils 58.7 %    % Lymphocytes 29.3 %    % Monocytes 8.3 %    % Eosinophils 2.7 %    % Basophils 0.6 %    % Immature Granulocytes 0.4 %    Nucleated RBCs 0 0 /100    Absolute Neutrophil 4.1 1.6 - 8.3 10e9/L    Absolute Lymphocytes 2.1 0.8 - 5.3 10e9/L    Absolute Monocytes 0.6 0.0 - 1.3 10e9/L    Absolute Eosinophils 0.2  0.0 - 0.7 10e9/L    Absolute Basophils 0.0 0.0 - 0.2 10e9/L    Abs Immature Granulocytes 0.0 0 - 0.4 10e9/L    Absolute Nucleated RBC 0.0    Albumin Random Urine Quantitative with Creat Ratio     Status: None   Result Value Ref Range    Creatinine Urine 195 mg/dL    Albumin Urine mg/L 17 mg/L    Albumin Urine mg/g Cr 8.62 0 - 17 mg/g Cr   Hemoglobin A1c     Status: Abnormal   Result Value Ref Range    Hemoglobin A1C 6.4 (H) 0 - 5.6 %   Estimated Average Glucose     Status: None   Result Value Ref Range    Estimated Average Glucose 137 mg/dL             Assessment & Plan     1. Type 2 diabetes mellitus without complication, without long-term current use of insulin (H)  Great control . Continue current medications and behavioral changes.   F/u in 6 months   - Hemoglobin A1c; Future  - Albumin Random Urine Quantitative with Creat Ratio; Future    2. History of total left hip arthroplasty  Healing well but still moderate pain-- ?? Sciatica -- see below  - CBC with platelets differential; Future  - Basic metabolic panel; Future  - diclofenac (VOLTAREN) 75 MG EC tablet; Take 1 tablet (75 mg) by mouth 2 times daily  Dispense: 60 tablet; Refill: 1    3. Essential hypertension with goal blood pressure less than 140/90  Stable - Continue current medications and behavioral changes.   - CBC with platelets differential; Future  - Basic metabolic panel; Future    4. Morbid obesity due to excess calories (H)  Up some- needs to work it back down jose when pain better   - Basic metabolic panel; Future    5. Pain of left lower leg  ?? Left sciatica-- will check MRI -- see if has left sided disk herniation -- if so, then consider IR consult   - diclofenac (VOLTAREN) 75 MG EC tablet; Take 1 tablet (75 mg) by mouth 2 times daily  Dispense: 60 tablet; Refill: 1  - MR Lumbar Spine w/o Contrast; Future     BMI:   Estimated body mass index is 45.43 kg/m  as calculated from the following:    Height as of 3/3/20: 1.829 m (6').    Weight as of  this encounter: 152 kg (335 lb).               No follow-ups on file.    Zak Schrader MD  [facility]

## 2020-08-10 ENCOUNTER — OFFICE VISIT (OUTPATIENT)
Dept: FAMILY MEDICINE | Facility: OTHER | Age: 55
End: 2020-08-10
Attending: FAMILY MEDICINE
Payer: COMMERCIAL

## 2020-08-10 VITALS
TEMPERATURE: 97.9 F | OXYGEN SATURATION: 98 % | DIASTOLIC BLOOD PRESSURE: 80 MMHG | WEIGHT: 315 LBS | BODY MASS INDEX: 45.43 KG/M2 | SYSTOLIC BLOOD PRESSURE: 138 MMHG | HEART RATE: 77 BPM

## 2020-08-10 DIAGNOSIS — I10 ESSENTIAL HYPERTENSION WITH GOAL BLOOD PRESSURE LESS THAN 140/90: ICD-10-CM

## 2020-08-10 DIAGNOSIS — Z96.642 HISTORY OF TOTAL LEFT HIP ARTHROPLASTY: ICD-10-CM

## 2020-08-10 DIAGNOSIS — E66.01 MORBID OBESITY DUE TO EXCESS CALORIES (H): ICD-10-CM

## 2020-08-10 DIAGNOSIS — M79.662 PAIN OF LEFT LOWER LEG: ICD-10-CM

## 2020-08-10 DIAGNOSIS — E11.9 TYPE 2 DIABETES MELLITUS WITHOUT COMPLICATION, WITHOUT LONG-TERM CURRENT USE OF INSULIN (H): Primary | ICD-10-CM

## 2020-08-10 DIAGNOSIS — E11.9 TYPE 2 DIABETES MELLITUS WITHOUT COMPLICATION, WITHOUT LONG-TERM CURRENT USE OF INSULIN (H): ICD-10-CM

## 2020-08-10 LAB
ANION GAP SERPL CALCULATED.3IONS-SCNC: 3 MMOL/L (ref 3–14)
BASOPHILS # BLD AUTO: 0 10E9/L (ref 0–0.2)
BASOPHILS NFR BLD AUTO: 0.6 %
BUN SERPL-MCNC: 22 MG/DL (ref 7–30)
CALCIUM SERPL-MCNC: 9 MG/DL (ref 8.5–10.1)
CHLORIDE SERPL-SCNC: 108 MMOL/L (ref 94–109)
CO2 SERPL-SCNC: 27 MMOL/L (ref 20–32)
CREAT SERPL-MCNC: 0.87 MG/DL (ref 0.66–1.25)
CREAT UR-MCNC: 195 MG/DL
DIFFERENTIAL METHOD BLD: NORMAL
EOSINOPHIL # BLD AUTO: 0.2 10E9/L (ref 0–0.7)
EOSINOPHIL NFR BLD AUTO: 2.7 %
ERYTHROCYTE [DISTWIDTH] IN BLOOD BY AUTOMATED COUNT: 14.7 % (ref 10–15)
EST. AVERAGE GLUCOSE BLD GHB EST-MCNC: 137 MG/DL
GFR SERPL CREATININE-BSD FRML MDRD: >90 ML/MIN/{1.73_M2}
GLUCOSE SERPL-MCNC: 140 MG/DL (ref 70–99)
HBA1C MFR BLD: 6.4 % (ref 0–5.6)
HCT VFR BLD AUTO: 40.5 % (ref 40–53)
HGB BLD-MCNC: 13.7 G/DL (ref 13.3–17.7)
IMM GRANULOCYTES # BLD: 0 10E9/L (ref 0–0.4)
IMM GRANULOCYTES NFR BLD: 0.4 %
LYMPHOCYTES # BLD AUTO: 2.1 10E9/L (ref 0.8–5.3)
LYMPHOCYTES NFR BLD AUTO: 29.3 %
MCH RBC QN AUTO: 28.4 PG (ref 26.5–33)
MCHC RBC AUTO-ENTMCNC: 33.8 G/DL (ref 31.5–36.5)
MCV RBC AUTO: 84 FL (ref 78–100)
MICROALBUMIN UR-MCNC: 17 MG/L
MICROALBUMIN/CREAT UR: 8.62 MG/G CR (ref 0–17)
MONOCYTES # BLD AUTO: 0.6 10E9/L (ref 0–1.3)
MONOCYTES NFR BLD AUTO: 8.3 %
NEUTROPHILS # BLD AUTO: 4.1 10E9/L (ref 1.6–8.3)
NEUTROPHILS NFR BLD AUTO: 58.7 %
NRBC # BLD AUTO: 0 10*3/UL
NRBC BLD AUTO-RTO: 0 /100
PLATELET # BLD AUTO: 233 10E9/L (ref 150–450)
POTASSIUM SERPL-SCNC: 3.8 MMOL/L (ref 3.4–5.3)
RBC # BLD AUTO: 4.82 10E12/L (ref 4.4–5.9)
SODIUM SERPL-SCNC: 138 MMOL/L (ref 133–144)
WBC # BLD AUTO: 7 10E9/L (ref 4–11)

## 2020-08-10 PROCEDURE — 85025 COMPLETE CBC W/AUTO DIFF WBC: CPT | Performed by: FAMILY MEDICINE

## 2020-08-10 PROCEDURE — 83036 HEMOGLOBIN GLYCOSYLATED A1C: CPT | Performed by: FAMILY MEDICINE

## 2020-08-10 PROCEDURE — 82043 UR ALBUMIN QUANTITATIVE: CPT | Performed by: FAMILY MEDICINE

## 2020-08-10 PROCEDURE — 99214 OFFICE O/P EST MOD 30 MIN: CPT | Performed by: FAMILY MEDICINE

## 2020-08-10 PROCEDURE — 80048 BASIC METABOLIC PNL TOTAL CA: CPT | Performed by: FAMILY MEDICINE

## 2020-08-10 PROCEDURE — 36415 COLL VENOUS BLD VENIPUNCTURE: CPT | Performed by: FAMILY MEDICINE

## 2020-08-10 RX ORDER — DICLOFENAC SODIUM 75 MG/1
75 TABLET, DELAYED RELEASE ORAL 2 TIMES DAILY
Qty: 60 TABLET | Refills: 1 | Status: SHIPPED | OUTPATIENT
Start: 2020-08-10 | End: 2020-10-29

## 2020-08-10 RX ORDER — HYDROCHLOROTHIAZIDE 12.5 MG/1
TABLET ORAL
COMMUNITY
Start: 2020-03-20 | End: 2021-02-23

## 2020-08-10 RX ORDER — LOSARTAN POTASSIUM 50 MG/1
TABLET ORAL
COMMUNITY
Start: 2020-03-20 | End: 2021-02-23

## 2020-08-10 RX ORDER — DICLOFENAC SODIUM 75 MG/1
75 TABLET, DELAYED RELEASE ORAL 2 TIMES DAILY
COMMUNITY
End: 2020-08-10

## 2020-08-10 ASSESSMENT — PAIN SCALES - GENERAL: PAINLEVEL: MILD PAIN (2)

## 2020-08-10 NOTE — NURSING NOTE
Chief Complaint   Patient presents with     Diabetes       Initial BP (!) 142/84   Pulse 77   Temp 97.9  F (36.6  C)   Wt (!) 152 kg (335 lb)   SpO2 98%   BMI 45.43 kg/m   Estimated body mass index is 45.43 kg/m  as calculated from the following:    Height as of 3/3/20: 1.829 m (6').    Weight as of this encounter: 152 kg (335 lb).  Medication Reconciliation: omar Alonso

## 2020-08-17 ENCOUNTER — HOSPITAL ENCOUNTER (OUTPATIENT)
Dept: MRI IMAGING | Facility: HOSPITAL | Age: 55
Discharge: HOME | End: 2020-08-17
Attending: FAMILY MEDICINE | Admitting: FAMILY MEDICINE
Payer: COMMERCIAL

## 2020-08-17 DIAGNOSIS — M79.662 PAIN OF LEFT LOWER LEG: ICD-10-CM

## 2020-08-17 PROCEDURE — 72148 MRI LUMBAR SPINE W/O DYE: CPT | Mod: TC

## 2020-08-18 DIAGNOSIS — M47.16 LUMBAR SPONDYLOSIS WITH MYELOPATHY: Primary | ICD-10-CM

## 2020-08-19 ENCOUNTER — HOSPITAL ENCOUNTER (OUTPATIENT)
Dept: INTERVENTIONAL RADIOLOGY/VASCULAR | Facility: HOSPITAL | Age: 55
Discharge: HOME | End: 2020-08-19
Attending: FAMILY MEDICINE | Admitting: RADIOLOGY
Payer: COMMERCIAL

## 2020-08-19 DIAGNOSIS — M47.16 LUMBAR SPONDYLOSIS WITH MYELOPATHY: ICD-10-CM

## 2020-08-19 PROCEDURE — G0463 HOSPITAL OUTPT CLINIC VISIT: HCPCS | Mod: TC

## 2020-09-18 ENCOUNTER — OFFICE VISIT (OUTPATIENT)
Dept: FAMILY MEDICINE | Facility: OTHER | Age: 55
End: 2020-09-18
Attending: FAMILY MEDICINE
Payer: COMMERCIAL

## 2020-09-18 DIAGNOSIS — Z01.818 PRE-OP EXAM: Primary | ICD-10-CM

## 2020-09-18 PROCEDURE — U0003 INFECTIOUS AGENT DETECTION BY NUCLEIC ACID (DNA OR RNA); SEVERE ACUTE RESPIRATORY SYNDROME CORONAVIRUS 2 (SARS-COV-2) (CORONAVIRUS DISEASE [COVID-19]), AMPLIFIED PROBE TECHNIQUE, MAKING USE OF HIGH THROUGHPUT TECHNOLOGIES AS DESCRIBED BY CMS-2020-01-R: HCPCS | Performed by: FAMILY MEDICINE

## 2020-09-19 LAB
SARS-COV-2 RNA SPEC QL NAA+PROBE: NOT DETECTED
SPECIMEN SOURCE: NORMAL

## 2020-09-22 ENCOUNTER — HOSPITAL ENCOUNTER (OUTPATIENT)
Dept: GENERAL RADIOLOGY | Facility: HOSPITAL | Age: 55
End: 2020-09-22
Attending: FAMILY MEDICINE | Admitting: RADIOLOGY
Payer: COMMERCIAL

## 2020-09-22 ENCOUNTER — HOSPITAL ENCOUNTER (OUTPATIENT)
Facility: HOSPITAL | Age: 55
Discharge: HOME | End: 2020-09-22
Attending: RADIOLOGY | Admitting: RADIOLOGY
Payer: COMMERCIAL

## 2020-09-22 DIAGNOSIS — M47.816 LUMBAR SPONDYLOSIS: ICD-10-CM

## 2020-09-22 PROCEDURE — 25000128 H RX IP 250 OP 636: Performed by: RADIOLOGY

## 2020-09-22 PROCEDURE — 62323 NJX INTERLAMINAR LMBR/SAC: CPT | Mod: TC

## 2020-09-22 PROCEDURE — C1751 CATH, INF, PER/CENT/MIDLINE: HCPCS | Mod: TC

## 2020-09-22 RX ORDER — METHYLPREDNISOLONE ACETATE 80 MG/ML
INJECTION, SUSPENSION INTRA-ARTICULAR; INTRALESIONAL; INTRAMUSCULAR; SOFT TISSUE
Status: DISCONTINUED
Start: 2020-09-22 | End: 2020-09-23 | Stop reason: HOSPADM

## 2020-09-22 RX ORDER — METHYLPREDNISOLONE ACETATE 80 MG/ML
80 INJECTION, SUSPENSION INTRA-ARTICULAR; INTRALESIONAL; INTRAMUSCULAR; SOFT TISSUE ONCE
Status: COMPLETED | OUTPATIENT
Start: 2020-09-22 | End: 2020-09-22

## 2020-09-22 RX ORDER — IOPAMIDOL 612 MG/ML
15 INJECTION, SOLUTION INTRATHECAL ONCE
Status: COMPLETED | OUTPATIENT
Start: 2020-09-22 | End: 2020-09-22

## 2020-09-22 RX ADMIN — IOPAMIDOL 3 ML: 612 INJECTION, SOLUTION INTRATHECAL at 13:35

## 2020-09-22 RX ADMIN — METHYLPREDNISOLONE ACETATE 80 MG: 80 INJECTION, SUSPENSION INTRA-ARTICULAR; INTRALESIONAL; INTRAMUSCULAR; SOFT TISSUE at 13:35

## 2020-09-22 NOTE — IP AVS SNAPSHOT
[address] [address]  Phone:  [phone]                                    After Visit Summary   9/22/2020    Glen Rogers    MRN: [id number]           After Visit Summary Signature Page    I have received my discharge instructions, and my questions have been answered. I have discussed any challenges I see with this plan with the nurse or doctor.    ..........................................................................................................................................  Patient/Patient Representative Signature      ..........................................................................................................................................  Patient Representative Print Name and Relationship to Patient    ..................................................               ................................................  Date                                   Time    ..........................................................................................................................................  Reviewed by Signature/Title    ...................................................              ..............................................  Date                                               Time          22EPIC Rev 08/18

## 2020-09-22 NOTE — DISCHARGE INSTRUCTIONS
Cell number on file:    Telephone Information:   Mobile [phone]     Is it ok to leave a message at this number(s)? Yes    Dr Merrill completed your procedure on 9/22/2020.    Current Pain Level (0-10 Scale): 3/10  Post Pain Level (0-10):  0/10    Radiology Discharge instructions for Steroid Injection    Activity Level:     Do not do any heavy activity or exercise for 24 hours.   Do not drive for 4 hours after your injection.  Diet:   Return to your normal diet.  Medications:   If you have stopped taking your Aspirin, Coumadin/Warfarin, Ibuprofen, or any   other blood thinner for this procedure you may resume in the morning unless   your primary care provider has given you other instructions.    Diabetics may see an increase in blood sugar after steroid injections. If you are concerned about your blood sugar, please contact your family doctor.    Site Care:  Remove the bandage and bathe or shower the morning after the procedure.      This is a Pain Management procedure.  You will be contacted in two weeks for follow up.    Call your Primary Care Provider if you have the following (if your primary care provider is not available please seek emergency care):   Nausea with vomiting   Severe headache   Drowsiness or confusion   Redness or drainage at the injection or puncture site   Temperature over 101 degrees F   Other concerns   Worsening back pain   Stiff neck

## 2020-10-07 ENCOUNTER — TELEPHONE (OUTPATIENT)
Dept: GENERAL RADIOLOGY | Facility: HOSPITAL | Age: 55
End: 2020-10-07

## 2020-10-07 NOTE — TELEPHONE ENCOUNTER
CONSULT PATIENT  PAIN INJECTION POST CALL    Procedure: Epidural  Radiologist(s): Dr. Anil Merrill  Date of Procedure: 9-22-20    The patient had no questions or concerns.    Pre-procedure pain score was: 3 (See pre-procedure score)  Post-procedure pain score as of today is: 1.5  What % relief?  50%    Would you say this injection has been beneficial? yes  If yes, for how long? Had 100% relief for 3 days and then the pain started to gradually come back        Where is the pain? Left side low back  Can you describe the pain?constant ache. Before the injection, the patient had pain on the right side and that has now subsided.   Does the pain radiate anywhere? No     Is this new pain? No    Patient would like to pursue another injection.      Mindi Leal

## 2020-10-22 ENCOUNTER — TELEPHONE (OUTPATIENT)
Dept: INTERVENTIONAL RADIOLOGY/VASCULAR | Facility: HOSPITAL | Age: 55
End: 2020-10-22

## 2020-10-28 DIAGNOSIS — M79.662 PAIN OF LEFT LOWER LEG: ICD-10-CM

## 2020-10-28 DIAGNOSIS — Z96.642 HISTORY OF TOTAL LEFT HIP ARTHROPLASTY: ICD-10-CM

## 2020-10-29 ENCOUNTER — HOSPITAL ENCOUNTER (OUTPATIENT)
Dept: GENERAL RADIOLOGY | Facility: HOSPITAL | Age: 55
End: 2020-10-29
Attending: FAMILY MEDICINE | Admitting: RADIOLOGY
Payer: COMMERCIAL

## 2020-10-29 ENCOUNTER — HOSPITAL ENCOUNTER (OUTPATIENT)
Facility: HOSPITAL | Age: 55
Discharge: HOME | End: 2020-10-29
Attending: RADIOLOGY | Admitting: RADIOLOGY
Payer: COMMERCIAL

## 2020-10-29 DIAGNOSIS — M47.816 LUMBAR SPONDYLOSIS: ICD-10-CM

## 2020-10-29 PROCEDURE — 250N000011 HC RX IP 250 OP 636: Performed by: RADIOLOGY

## 2020-10-29 PROCEDURE — C1751 CATH, INF, PER/CENT/MIDLINE: HCPCS

## 2020-10-29 RX ORDER — IOPAMIDOL 612 MG/ML
15 INJECTION, SOLUTION INTRATHECAL ONCE
Status: COMPLETED | OUTPATIENT
Start: 2020-10-29 | End: 2020-10-29

## 2020-10-29 RX ORDER — METHYLPREDNISOLONE ACETATE 80 MG/ML
80 INJECTION, SUSPENSION INTRA-ARTICULAR; INTRALESIONAL; INTRAMUSCULAR; SOFT TISSUE ONCE
Status: COMPLETED | OUTPATIENT
Start: 2020-10-29 | End: 2020-10-29

## 2020-10-29 RX ORDER — METHYLPREDNISOLONE ACETATE 80 MG/ML
INJECTION, SUSPENSION INTRA-ARTICULAR; INTRALESIONAL; INTRAMUSCULAR; SOFT TISSUE
Status: DISCONTINUED
Start: 2020-10-29 | End: 2020-10-30 | Stop reason: HOSPADM

## 2020-10-29 RX ORDER — DICLOFENAC SODIUM 75 MG/1
TABLET, DELAYED RELEASE ORAL
Qty: 60 TABLET | Refills: 0 | Status: SHIPPED | OUTPATIENT
Start: 2020-10-29 | End: 2020-12-14

## 2020-10-29 RX ADMIN — METHYLPREDNISOLONE ACETATE 80 MG: 80 INJECTION, SUSPENSION INTRA-ARTICULAR; INTRALESIONAL; INTRAMUSCULAR; SOFT TISSUE at 13:22

## 2020-10-29 RX ADMIN — IOPAMIDOL 6 ML: 612 INJECTION, SOLUTION INTRATHECAL at 13:22

## 2020-10-29 NOTE — TELEPHONE ENCOUNTER
voltaren 75 mg      Last Written Prescription Date:  8-  Last Fill Quantity: 60,   # refills: 1  Last Office Visit: 8-

## 2020-10-29 NOTE — IP AVS SNAPSHOT
[address] [address]  Phone: [phone]                                    After Visit Summary   10/29/2020    Glen Rogers    MRN: [id number]           After Visit Summary Signature Page    I have received my discharge instructions, and my questions have been answered. I have discussed any challenges I see with this plan with the nurse or doctor.    ..........................................................................................................................................  Patient/Patient Representative Signature      ..........................................................................................................................................  Patient Representative Print Name and Relationship to Patient    ..................................................               ................................................  Date                                   Time    ..........................................................................................................................................  Reviewed by Signature/Title    ...................................................              ..............................................  Date                                               Time          22EPIC Rev 08/18

## 2020-10-29 NOTE — DISCHARGE INSTRUCTIONS
Cell number on file:    Telephone Information:   Mobile [phone]     Is it ok to leave a message at this number(s)? Yes    Dr. Hernandez completed your procedure on 10/29/2020.    Current Pain Level (0-10 Scale): 1/10  Post Pain Level (0-10):  0/10    Radiology Discharge instructions for Steroid Injection    Activity Level:     Do not do any heavy activity or exercise for 24 hours.   Do not drive for 4 hours after your injection.  Diet:   Return to your normal diet.  Medications:   If you have stopped taking your Aspirin, Coumadin/Warfarin, Ibuprofen, or any   other blood thinner for this procedure you may resume in the morning unless   your primary care provider has given you other instructions.    Diabetics may see an increase in blood sugar after steroid injections. If you are concerned about your blood sugar, please contact your family doctor.    Site Care:  Remove the bandage and bathe or shower the morning after the procedure.      This is a Pain Management procedure.  You will be contacted in two weeks for follow up.    Call your Primary Care Provider if you have the following (if your primary care provider is not available please seek emergency care):   Nausea with vomiting   Severe headache   Drowsiness or confusion   Redness or drainage at the injection or puncture site   Temperature over 101 degrees F   Other concerns   Worsening back pain   Stiff neck

## 2020-11-12 ENCOUNTER — TELEPHONE (OUTPATIENT)
Dept: INTERVENTIONAL RADIOLOGY/VASCULAR | Facility: HOSPITAL | Age: 55
End: 2020-11-12

## 2020-11-16 ENCOUNTER — TELEPHONE (OUTPATIENT)
Dept: INTERVENTIONAL RADIOLOGY/VASCULAR | Facility: HOSPITAL | Age: 55
End: 2020-11-16

## 2020-11-16 NOTE — TELEPHONE ENCOUNTER
CONSULT PATIENT  PAIN INJECTION POST CALL    Procedure: Epidural TL L3-4  Radiologist(s): Dr. Ousmane Hernandez  Date of Procedure: 10-29-20    The patient had no questions or concerns.    Pre-procedure pain score was: 1 (See pre-procedure score)  Post-procedure pain score as of today is: 0-1  What % relief?Patient states he is experiencing 100-90% relief     Would you say this injection has been beneficial? Yes   If yes, for how long? Currently    Was there one injection that worked better than the other? Patient states this injection helped 40% more than the first one.    Where is the pain? A little discomfort in low back and body due to falling on the ice this morning.   Can you describe the pain? Not really any pain  Does the pain radiate anywhere?n/a  If yes, where does it radiate and where does the pain stop?n/a    Is this new pain? No    Patient would not like to pursue another injection at this time.  The patient will contact IR at 1251 if that changes.      Valentina Maldonado

## 2021-02-03 NOTE — PROGRESS NOTES
Assessment & Plan     Type 2 diabetes mellitus without complication, without long-term current use of insulin (H)  Awesome control. Continue current medications and behavioral changes.   Follow-up in 6 mongths   - Hemoglobin A1c; Future  - Lipid Profile; Future  - Albumin Random Urine Quantitative with Creat Ratio; Future  - Comprehensive metabolic panel; Future    Essential hypertension with goal blood pressure less than 140/90  Stable on meds. Continue current medications and behavioral changes.   - Lipid Profile; Future  - Comprehensive metabolic panel; Future    Pure hypercholesterolemia  Stable on meds. Continue current medications and behavioral changes. Continue statin   - Lipid Profile; Future  - Comprehensive metabolic panel; Future    Morbid obesity due to excess calories (H)  Keep trying to keep wt down.   - Comprehensive metabolic panel; Future    Screening for prostate cancer  psa ok   - Prostate spec antigen screen; Future      Low back pain - good and bad days. BENNIE helped some - call if needs another        BMI:   Estimated body mass index is 47.47 kg/m  as calculated from the following:    Height as of this encounter: 1.829 m (6').    Weight as of this encounter: 158.8 kg (350 lb).           No follow-ups on file.    Zak Schrader MD  [facility] - JEFF Carroll is a 55 year old who presents to clinic today for the following health issues     HPI       Diabetes Follow-up      How often are you checking your blood sugar? Not at all    What concerns do you have today about your diabetes? None     Do you have any of these symptoms? (Select all that apply)  No numbness or tingling in feet.  No redness, sores or blisters on feet.  No complaints of excessive thirst.  No reports of blurry vision.  No significant changes to weight.    Have you had a diabetic eye exam in the last 12 months? No    1. foot deformity   Yes  2. Current or previous foot ulceration     No  3.  Current or previous pre-ulcerative calluses     No  4. previous partial amputation of one or both feet or complete amputation of one foot     No  5. peripheral neuropathy with evidence of callus formation     No  6. poor circulation     No          Hyperlipidemia Follow-Up      Are you regularly taking any medication or supplement to lower your cholesterol?   Yes- Lipitor 10mg    Are you having muscle aches or other side effects that you think could be caused by your cholesterol lowering medication?  No    Hypertension Follow-up      Do you check your blood pressure regularly outside of the clinic? No     Are you following a low salt diet? Yes    Are your blood pressures ever more than 140 on the top number (systolic) OR more   than 90 on the bottom number (diastolic), for example 140/90? Yes    BP Readings from Last 2 Encounters:   08/10/20 138/80   03/05/20 129/60     Hemoglobin A1C (%)   Date Value   08/10/2020 6.4 (H)   01/15/2020 6.2 (H)     LDL Cholesterol Calculated (mg/dL)   Date Value   01/15/2020 115 (H)   01/14/2019 101 (H)         How many servings of fruits and vegetables do you eat daily?  0-1    On average, how many sweetened beverages do you drink each day (Examples: soda, juice, sweet tea, etc.  Do NOT count diet or artificially sweetened beverages)?   0    How many days per week do you exercise enough to make your heart beat faster? 3 or less    How many minutes a day do you exercise enough to make your heart beat faster? 9 or less    How many days per week do you miss taking your medication? 0        Review of Systems   Constitutional, HEENT, cardiovascular, pulmonary, gi and gu systems are negative, except as otherwise noted.      Objective    /70 (BP Location: Right arm, Patient Position: Sitting, Cuff Size: Adult Large)   Pulse 77   Temp 97.6  F (36.4  C) (Tympanic)   Ht 1.829 m (6')   Wt (!) 158.8 kg (350 lb)   SpO2 95%   BMI 47.47 kg/m    Body mass index is 47.47 kg/m .  Physical  Exam   GENERAL: healthy, alert and no distress  NECK: no adenopathy, no asymmetry, masses, or scars and thyroid normal to palpation  RESP: lungs clear to auscultation - no rales, rhonchi or wheezes  CV: regular rate and rhythm, normal S1 S2, no S3 or S4, no murmur, click or rub, no peripheral edema and peripheral pulses strong  ABDOMEN: soft, nontender, no hepatosplenomegaly, no masses and bowel sounds normal  MS: no gross musculoskeletal defects noted, no edema    Results for orders placed or performed in visit on 02/10/21   Prostate spec antigen screen     Status: None   Result Value Ref Range    PSA 0.24 0 - 4 ug/L   Comprehensive metabolic panel     Status: Abnormal   Result Value Ref Range    Sodium 137 133 - 144 mmol/L    Potassium 4.2 3.4 - 5.3 mmol/L    Chloride 107 94 - 109 mmol/L    Carbon Dioxide 26 20 - 32 mmol/L    Anion Gap 4 3 - 14 mmol/L    Glucose 143 (H) 70 - 99 mg/dL    Urea Nitrogen 23 7 - 30 mg/dL    Creatinine 0.66 0.66 - 1.25 mg/dL    GFR Estimate >90 >60 mL/min/[1.73_m2]    GFR Estimate If Black >90 >60 mL/min/[1.73_m2]    Calcium 9.1 8.5 - 10.1 mg/dL    Bilirubin Total 0.3 0.2 - 1.3 mg/dL    Albumin 3.8 3.4 - 5.0 g/dL    Protein Total 7.3 6.8 - 8.8 g/dL    Alkaline Phosphatase 76 40 - 150 U/L    ALT 53 0 - 70 U/L    AST 19 0 - 45 U/L   Albumin Random Urine Quantitative with Creat Ratio     Status: None   Result Value Ref Range    Creatinine Urine 128 mg/dL    Albumin Urine mg/L 8 mg/L    Albumin Urine mg/g Cr 5.88 0 - 17 mg/g Cr   Lipid Profile     Status: None   Result Value Ref Range    Cholesterol 163 <200 mg/dL    Triglycerides 93 <150 mg/dL    HDL Cholesterol 50 >39 mg/dL    LDL Cholesterol Calculated 94 <100 mg/dL    Non HDL Cholesterol 113 <130 mg/dL   Hemoglobin A1c     Status: Abnormal   Result Value Ref Range    Hemoglobin A1C 6.4 (H) 0 - 5.6 %

## 2021-02-10 ENCOUNTER — OFFICE VISIT (OUTPATIENT)
Dept: FAMILY MEDICINE | Facility: OTHER | Age: 56
End: 2021-02-10
Attending: FAMILY MEDICINE
Payer: COMMERCIAL

## 2021-02-10 VITALS
DIASTOLIC BLOOD PRESSURE: 70 MMHG | OXYGEN SATURATION: 95 % | HEART RATE: 77 BPM | HEIGHT: 72 IN | SYSTOLIC BLOOD PRESSURE: 114 MMHG | TEMPERATURE: 97.6 F | BODY MASS INDEX: 42.66 KG/M2 | WEIGHT: 315 LBS

## 2021-02-10 DIAGNOSIS — E78.00 PURE HYPERCHOLESTEROLEMIA: ICD-10-CM

## 2021-02-10 DIAGNOSIS — I10 ESSENTIAL HYPERTENSION WITH GOAL BLOOD PRESSURE LESS THAN 140/90: ICD-10-CM

## 2021-02-10 DIAGNOSIS — E11.9 TYPE 2 DIABETES MELLITUS WITHOUT COMPLICATION, WITHOUT LONG-TERM CURRENT USE OF INSULIN (H): Primary | ICD-10-CM

## 2021-02-10 DIAGNOSIS — Z12.5 SCREENING FOR PROSTATE CANCER: ICD-10-CM

## 2021-02-10 DIAGNOSIS — E11.9 TYPE 2 DIABETES MELLITUS WITHOUT COMPLICATION, WITHOUT LONG-TERM CURRENT USE OF INSULIN (H): ICD-10-CM

## 2021-02-10 DIAGNOSIS — E66.01 MORBID OBESITY DUE TO EXCESS CALORIES (H): ICD-10-CM

## 2021-02-10 LAB
ALBUMIN SERPL-MCNC: 3.8 G/DL (ref 3.4–5)
ALP SERPL-CCNC: 76 U/L (ref 40–150)
ALT SERPL W P-5'-P-CCNC: 53 U/L (ref 0–70)
ANION GAP SERPL CALCULATED.3IONS-SCNC: 4 MMOL/L (ref 3–14)
AST SERPL W P-5'-P-CCNC: 19 U/L (ref 0–45)
BILIRUB SERPL-MCNC: 0.3 MG/DL (ref 0.2–1.3)
BUN SERPL-MCNC: 23 MG/DL (ref 7–30)
CALCIUM SERPL-MCNC: 9.1 MG/DL (ref 8.5–10.1)
CHLORIDE SERPL-SCNC: 107 MMOL/L (ref 94–109)
CHOLEST SERPL-MCNC: 163 MG/DL
CO2 SERPL-SCNC: 26 MMOL/L (ref 20–32)
CREAT SERPL-MCNC: 0.66 MG/DL (ref 0.66–1.25)
CREAT UR-MCNC: 128 MG/DL
EST. AVERAGE GLUCOSE BLD GHB EST-MCNC: 137 MG/DL
GFR SERPL CREATININE-BSD FRML MDRD: >90 ML/MIN/{1.73_M2}
GLUCOSE SERPL-MCNC: 143 MG/DL (ref 70–99)
HBA1C MFR BLD: 6.4 % (ref 0–5.6)
HDLC SERPL-MCNC: 50 MG/DL
LDLC SERPL CALC-MCNC: 94 MG/DL
MICROALBUMIN UR-MCNC: 8 MG/L
MICROALBUMIN/CREAT UR: 5.88 MG/G CR (ref 0–17)
NONHDLC SERPL-MCNC: 113 MG/DL
POTASSIUM SERPL-SCNC: 4.2 MMOL/L (ref 3.4–5.3)
PROT SERPL-MCNC: 7.3 G/DL (ref 6.8–8.8)
PSA SERPL-ACNC: 0.24 UG/L (ref 0–4)
SODIUM SERPL-SCNC: 137 MMOL/L (ref 133–144)
TRIGL SERPL-MCNC: 93 MG/DL

## 2021-02-10 PROCEDURE — 82043 UR ALBUMIN QUANTITATIVE: CPT | Performed by: FAMILY MEDICINE

## 2021-02-10 PROCEDURE — 80061 LIPID PANEL: CPT | Performed by: FAMILY MEDICINE

## 2021-02-10 PROCEDURE — 99214 OFFICE O/P EST MOD 30 MIN: CPT | Performed by: FAMILY MEDICINE

## 2021-02-10 PROCEDURE — 84153 ASSAY OF PSA TOTAL: CPT | Performed by: FAMILY MEDICINE

## 2021-02-10 PROCEDURE — 80053 COMPREHEN METABOLIC PANEL: CPT | Performed by: FAMILY MEDICINE

## 2021-02-10 PROCEDURE — 36415 COLL VENOUS BLD VENIPUNCTURE: CPT | Performed by: FAMILY MEDICINE

## 2021-02-10 PROCEDURE — 83036 HEMOGLOBIN GLYCOSYLATED A1C: CPT | Performed by: FAMILY MEDICINE

## 2021-02-10 ASSESSMENT — PAIN SCALES - GENERAL: PAINLEVEL: NO PAIN (0)

## 2021-02-10 ASSESSMENT — MIFFLIN-ST. JEOR: SCORE: 2455.59

## 2021-02-10 NOTE — NURSING NOTE
Chief Complaint   Patient presents with     Diabetes     Lipids     Hypertension       Initial /70 (BP Location: Right arm, Patient Position: Sitting, Cuff Size: Adult Large)   Pulse 77   Temp 97.6  F (36.4  C) (Tympanic)   Ht 1.829 m (6')   Wt (!) 158.8 kg (350 lb)   SpO2 95%   BMI 47.47 kg/m   Estimated body mass index is 47.47 kg/m  as calculated from the following:    Height as of this encounter: 1.829 m (6').    Weight as of this encounter: 158.8 kg (350 lb).  Medication Reconciliation: complete  Luanne Barone LPN

## 2021-02-10 NOTE — PATIENT INSTRUCTIONS
Results for orders placed or performed in visit on 02/10/21   Prostate spec antigen screen     Status: None   Result Value Ref Range    PSA 0.24 0 - 4 ug/L   Comprehensive metabolic panel     Status: Abnormal   Result Value Ref Range    Sodium 137 133 - 144 mmol/L    Potassium 4.2 3.4 - 5.3 mmol/L    Chloride 107 94 - 109 mmol/L    Carbon Dioxide 26 20 - 32 mmol/L    Anion Gap 4 3 - 14 mmol/L    Glucose 143 (H) 70 - 99 mg/dL    Urea Nitrogen 23 7 - 30 mg/dL    Creatinine 0.66 0.66 - 1.25 mg/dL    GFR Estimate >90 >60 mL/min/[1.73_m2]    GFR Estimate If Black >90 >60 mL/min/[1.73_m2]    Calcium 9.1 8.5 - 10.1 mg/dL    Bilirubin Total 0.3 0.2 - 1.3 mg/dL    Albumin 3.8 3.4 - 5.0 g/dL    Protein Total 7.3 6.8 - 8.8 g/dL    Alkaline Phosphatase 76 40 - 150 U/L    ALT 53 0 - 70 U/L    AST 19 0 - 45 U/L   Albumin Random Urine Quantitative with Creat Ratio     Status: None   Result Value Ref Range    Creatinine Urine 128 mg/dL    Albumin Urine mg/L 8 mg/L    Albumin Urine mg/g Cr 5.88 0 - 17 mg/g Cr   Lipid Profile     Status: None   Result Value Ref Range    Cholesterol 163 <200 mg/dL    Triglycerides 93 <150 mg/dL    HDL Cholesterol 50 >39 mg/dL    LDL Cholesterol Calculated 94 <100 mg/dL    Non HDL Cholesterol 113 <130 mg/dL   Hemoglobin A1c     Status: Abnormal   Result Value Ref Range    Hemoglobin A1C 6.4 (H) 0 - 5.6 %

## 2021-03-09 ENCOUNTER — TELEPHONE (OUTPATIENT)
Dept: FAMILY MEDICINE | Facility: OTHER | Age: 56
End: 2021-03-09

## 2021-07-29 NOTE — PROGRESS NOTES
Assessment & Plan     Type 2 diabetes mellitus without complication, without long-term current use of insulin (H)  Great control- discussed. Keep up good job. Continue current medications and behavioral changes.   Keep up on be active as possible and wt loss. Wt up some.   - Hemoglobin A1c; Future  - Basic metabolic panel; Future  - metFORMIN (GLUCOPHAGE-XR) 500 MG 24 hr tablet; Take 2 tablets (1,000 mg) by mouth daily (with dinner)    Essential hypertension with goal blood pressure less than 140/90  Stable - Continue current medications and behavioral changes.   - Hemoglobin A1c; Future  - Basic metabolic panel; Future      OVER 5 minutes spent on Covid Vaccine and his beliefs and opinions.  Pt is aware he is at risk for Morbidity and Mortality from it.        BMI:   Estimated body mass index is 47.74 kg/m  as calculated from the following:    Height as of this encounter: 1.829 m (6').    Weight as of this encounter: 159.7 kg (352 lb).           No follow-ups on file.    Zak Schrader MD  [facility] - JEFF Carroll is a 55 year old who presents for the following health issues     HPI     Diabetes Follow-up    How often are you checking your blood sugar? A few times a month  What time of day are you checking your blood sugars (select all that apply)?  Before meals  Have you had any blood sugars above 200?  No  Have you had any blood sugars below 70?  No    What symptoms do you notice when your blood sugar is low?  None    What concerns do you have today about your diabetes? None     Do you have any of these symptoms? (Select all that apply)  No numbness or tingling in feet.  No redness, sores or blisters on feet.  No complaints of excessive thirst.  No reports of blurry vision.  No significant changes to weight.    Have you had a diabetic eye exam in the last 12 months? No          Hyperlipidemia Follow-Up      Are you regularly taking any medication or supplement to lower your  cholesterol?   Yes- lipitor    Are you having muscle aches or other side effects that you think could be caused by your cholesterol lowering medication?  No    Hypertension Follow-up      Do you check your blood pressure regularly outside of the clinic? No     Are you following a low salt diet? No    Are your blood pressures ever more than 140 on the top number (systolic) OR more   than 90 on the bottom number (diastolic), for example 140/90? No    BP Readings from Last 2 Encounters:   02/10/21 114/70   08/10/20 138/80     Hemoglobin A1C (%)   Date Value   02/10/2021 6.4 (H)   08/10/2020 6.4 (H)     LDL Cholesterol Calculated (mg/dL)   Date Value   02/10/2021 94   01/15/2020 115 (H)             Review of Systems   Constitutional, HEENT, cardiovascular, pulmonary, gi and gu systems are negative, except as otherwise noted.      Objective    /68   Pulse 88   Temp 97.3  F (36.3  C)   Resp 20   Ht 1.829 m (6')   Wt (!) 159.7 kg (352 lb)   SpO2 97%   BMI 47.74 kg/m    Body mass index is 47.74 kg/m .  Physical Exam   GENERAL: healthy, alert and no distress  NECK: no adenopathy, no asymmetry, masses, or scars and thyroid normal to palpation  RESP: lungs clear to auscultation - no rales, rhonchi or wheezes  CV: regular rate and rhythm, normal S1 S2, no S3 or S4, no murmur, click or rub, no peripheral edema and peripheral pulses strong  ABDOMEN: soft, nontender, no hepatosplenomegaly, no masses and bowel sounds normal  MS: no gross musculoskeletal defects noted, no edema        Results for orders placed or performed in visit on 08/11/21   Hemoglobin A1c     Status: Abnormal   Result Value Ref Range    Estimated Average Glucose 143 mg/dL    Hemoglobin A1C 6.6 (H) 0.0 - 5.6 %   Basic metabolic panel     Status: Abnormal   Result Value Ref Range    Sodium 139 133 - 144 mmol/L    Potassium 3.8 3.4 - 5.3 mmol/L    Chloride 108 94 - 109 mmol/L    Carbon Dioxide (CO2) 25 20 - 32 mmol/L    Anion Gap 6 3 - 14 mmol/L     Urea Nitrogen 17 7 - 30 mg/dL    Creatinine 0.71 0.66 - 1.25 mg/dL    Calcium 9.2 8.5 - 10.1 mg/dL    Glucose 109 (H) 70 - 99 mg/dL    GFR Estimate >90 >60 mL/min/1.73m2

## 2021-08-11 ENCOUNTER — LAB (OUTPATIENT)
Dept: LAB | Facility: OTHER | Age: 56
End: 2021-08-11
Attending: FAMILY MEDICINE
Payer: COMMERCIAL

## 2021-08-11 ENCOUNTER — OFFICE VISIT (OUTPATIENT)
Dept: FAMILY MEDICINE | Facility: OTHER | Age: 56
End: 2021-08-11
Attending: FAMILY MEDICINE
Payer: COMMERCIAL

## 2021-08-11 VITALS
WEIGHT: 315 LBS | SYSTOLIC BLOOD PRESSURE: 126 MMHG | RESPIRATION RATE: 20 BRPM | TEMPERATURE: 97.3 F | HEART RATE: 88 BPM | DIASTOLIC BLOOD PRESSURE: 68 MMHG | OXYGEN SATURATION: 97 % | BODY MASS INDEX: 42.66 KG/M2 | HEIGHT: 72 IN

## 2021-08-11 DIAGNOSIS — E11.9 TYPE 2 DIABETES MELLITUS WITHOUT COMPLICATION, WITHOUT LONG-TERM CURRENT USE OF INSULIN (H): ICD-10-CM

## 2021-08-11 DIAGNOSIS — E11.9 TYPE 2 DIABETES MELLITUS WITHOUT COMPLICATION, WITHOUT LONG-TERM CURRENT USE OF INSULIN (H): Primary | ICD-10-CM

## 2021-08-11 DIAGNOSIS — I10 ESSENTIAL HYPERTENSION WITH GOAL BLOOD PRESSURE LESS THAN 140/90: ICD-10-CM

## 2021-08-11 LAB
ANION GAP SERPL CALCULATED.3IONS-SCNC: 6 MMOL/L (ref 3–14)
BUN SERPL-MCNC: 17 MG/DL (ref 7–30)
CALCIUM SERPL-MCNC: 9.2 MG/DL (ref 8.5–10.1)
CHLORIDE BLD-SCNC: 108 MMOL/L (ref 94–109)
CO2 SERPL-SCNC: 25 MMOL/L (ref 20–32)
CREAT SERPL-MCNC: 0.71 MG/DL (ref 0.66–1.25)
EST. AVERAGE GLUCOSE BLD GHB EST-MCNC: 143 MG/DL
GFR SERPL CREATININE-BSD FRML MDRD: >90 ML/MIN/1.73M2
GLUCOSE BLD-MCNC: 109 MG/DL (ref 70–99)
HBA1C MFR BLD: 6.6 % (ref 0–5.6)
POTASSIUM BLD-SCNC: 3.8 MMOL/L (ref 3.4–5.3)
SODIUM SERPL-SCNC: 139 MMOL/L (ref 133–144)

## 2021-08-11 PROCEDURE — 83036 HEMOGLOBIN GLYCOSYLATED A1C: CPT

## 2021-08-11 PROCEDURE — 36415 COLL VENOUS BLD VENIPUNCTURE: CPT

## 2021-08-11 PROCEDURE — 80048 BASIC METABOLIC PNL TOTAL CA: CPT

## 2021-08-11 PROCEDURE — 99213 OFFICE O/P EST LOW 20 MIN: CPT | Performed by: FAMILY MEDICINE

## 2021-08-11 RX ORDER — METFORMIN HCL 500 MG
1000 TABLET, EXTENDED RELEASE 24 HR ORAL
Qty: 180 TABLET | Refills: 2 | Status: SHIPPED | OUTPATIENT
Start: 2021-08-11 | End: 2022-02-14

## 2021-08-11 ASSESSMENT — MIFFLIN-ST. JEOR: SCORE: 2464.66

## 2021-08-11 ASSESSMENT — PAIN SCALES - GENERAL: PAINLEVEL: MILD PAIN (2)

## 2021-08-11 NOTE — NURSING NOTE
Chief Complaint   Patient presents with     Diabetes       Initial /68   Pulse 88   Temp 97.3  F (36.3  C)   Resp 20   Ht 1.829 m (6')   Wt (!) 159.7 kg (352 lb)   SpO2 97%   BMI 47.74 kg/m   Estimated body mass index is 47.74 kg/m  as calculated from the following:    Height as of this encounter: 1.829 m (6').    Weight as of this encounter: 159.7 kg (352 lb).  Medication Reconciliation: complete  Steven Deras LPN

## 2022-02-11 NOTE — PROGRESS NOTES
"  {PROVIDER CHARTING PREFERENCE:959587}    Lindsey Carroll is a 56 year old who presents for the following health issues {ACCOMPANIED BY STATEMENT (Optional):652469}    HPI     Diabetes Follow-up      How often are you checking your blood sugar? { :240396}    What concerns do you have today about your diabetes? { :102911::\"None\"}     Do you have any of these symptoms? (Select all that apply)  { :432105}    Have you had a diabetic eye exam in the last 12 months? { :776139}        {Reference  Diabetes Management Resources :899779}    {Reference  Diabetes Log - 7 days :297184}    Hyperlipidemia Follow-Up      Are you regularly taking any medication or supplement to lower your cholesterol?   { :142818}    Are you having muscle aches or other side effects that you think could be caused by your cholesterol lowering medication?  { :385842}    Hypertension Follow-up      Do you check your blood pressure regularly outside of the clinic? { :755050}     Are you following a low salt diet? { :531185}    Are your blood pressures ever more than 140 on the top number (systolic) OR more   than 90 on the bottom number (diastolic), for example 140/90? { :773455}    BP Readings from Last 2 Encounters:   08/11/21 126/68   02/10/21 114/70     Hemoglobin A1C POCT (%)   Date Value   02/10/2021 6.4 (H)   08/10/2020 6.4 (H)     Hemoglobin A1C (%)   Date Value   08/11/2021 6.6 (H)     LDL Cholesterol Calculated (mg/dL)   Date Value   02/10/2021 94   01/15/2020 115 (H)         How many servings of fruits and vegetables do you eat daily?  { :279090}    On average, how many sweetened beverages do you drink each day (Examples: soda, juice, sweet tea, etc.  Do NOT count diet or artificially sweetened beverages)?   { 1-11:814752}    How many days per week do you exercise enough to make your heart beat faster? { :339138}    How many minutes a day do you exercise enough to make your heart beat faster? { :045171}    How many days per week do you " miss taking your medication? {0-7 :773020}    {additonal problems for provider to add (Optional):173490}    Review of Systems   {ROS COMP (Optional):114625}      Objective    There were no vitals taken for this visit.  There is no height or weight on file to calculate BMI.  Physical Exam   {Exam List (Optional):214952}    {Diagnostic Test Results (Optional):842909}    {AMBULATORY ATTESTATION (Optional):367807}

## 2022-02-14 ENCOUNTER — OFFICE VISIT (OUTPATIENT)
Dept: FAMILY MEDICINE | Facility: OTHER | Age: 57
End: 2022-02-14
Attending: FAMILY MEDICINE
Payer: COMMERCIAL

## 2022-02-14 ENCOUNTER — LAB (OUTPATIENT)
Dept: LAB | Facility: OTHER | Age: 57
End: 2022-02-14
Attending: FAMILY MEDICINE
Payer: COMMERCIAL

## 2022-02-14 VITALS
RESPIRATION RATE: 20 BRPM | DIASTOLIC BLOOD PRESSURE: 87 MMHG | SYSTOLIC BLOOD PRESSURE: 132 MMHG | BODY MASS INDEX: 46.79 KG/M2 | OXYGEN SATURATION: 98 % | WEIGHT: 315 LBS | HEART RATE: 75 BPM | TEMPERATURE: 97.8 F

## 2022-02-14 DIAGNOSIS — E11.9 TYPE 2 DIABETES MELLITUS WITHOUT COMPLICATION, WITHOUT LONG-TERM CURRENT USE OF INSULIN (H): ICD-10-CM

## 2022-02-14 DIAGNOSIS — I10 ESSENTIAL HYPERTENSION WITH GOAL BLOOD PRESSURE LESS THAN 140/90: ICD-10-CM

## 2022-02-14 DIAGNOSIS — Z12.5 SCREENING FOR PROSTATE CANCER: ICD-10-CM

## 2022-02-14 DIAGNOSIS — E11.9 TYPE 2 DIABETES MELLITUS WITHOUT COMPLICATION, WITHOUT LONG-TERM CURRENT USE OF INSULIN (H): Primary | ICD-10-CM

## 2022-02-14 DIAGNOSIS — E78.00 PURE HYPERCHOLESTEROLEMIA: ICD-10-CM

## 2022-02-14 DIAGNOSIS — Z23 NEED FOR PROPHYLACTIC VACCINATION AND INOCULATION AGAINST INFLUENZA: ICD-10-CM

## 2022-02-14 DIAGNOSIS — Z71.85 VACCINE COUNSELING: ICD-10-CM

## 2022-02-14 DIAGNOSIS — Z23 NEED FOR VACCINATION: ICD-10-CM

## 2022-02-14 DIAGNOSIS — E66.01 MORBID OBESITY DUE TO EXCESS CALORIES (H): ICD-10-CM

## 2022-02-14 LAB
ALBUMIN SERPL-MCNC: 4.2 G/DL (ref 3.4–5)
ALP SERPL-CCNC: 76 U/L (ref 40–150)
ALT SERPL W P-5'-P-CCNC: 56 U/L (ref 0–70)
ANION GAP SERPL CALCULATED.3IONS-SCNC: 6 MMOL/L (ref 3–14)
AST SERPL W P-5'-P-CCNC: 21 U/L (ref 0–45)
BASOPHILS # BLD AUTO: 0 10E3/UL (ref 0–0.2)
BASOPHILS NFR BLD AUTO: 0 %
BILIRUB SERPL-MCNC: 0.5 MG/DL (ref 0.2–1.3)
BUN SERPL-MCNC: 17 MG/DL (ref 7–30)
CALCIUM SERPL-MCNC: 9.2 MG/DL (ref 8.5–10.1)
CHLORIDE BLD-SCNC: 105 MMOL/L (ref 94–109)
CHOLEST SERPL-MCNC: 169 MG/DL
CO2 SERPL-SCNC: 27 MMOL/L (ref 20–32)
CREAT SERPL-MCNC: 0.77 MG/DL (ref 0.66–1.25)
CREAT UR-MCNC: 101 MG/DL
EOSINOPHIL # BLD AUTO: 0.1 10E3/UL (ref 0–0.7)
EOSINOPHIL NFR BLD AUTO: 2 %
ERYTHROCYTE [DISTWIDTH] IN BLOOD BY AUTOMATED COUNT: 13.7 % (ref 10–15)
EST. AVERAGE GLUCOSE BLD GHB EST-MCNC: 151 MG/DL
FASTING STATUS PATIENT QL REPORTED: YES
GFR SERPL CREATININE-BSD FRML MDRD: >90 ML/MIN/1.73M2
GLUCOSE BLD-MCNC: 111 MG/DL (ref 70–99)
HBA1C MFR BLD: 6.9 % (ref 0–5.6)
HCT VFR BLD AUTO: 40.8 % (ref 40–53)
HDLC SERPL-MCNC: 51 MG/DL
HGB BLD-MCNC: 14.1 G/DL (ref 13.3–17.7)
IMM GRANULOCYTES # BLD: 0 10E3/UL
IMM GRANULOCYTES NFR BLD: 0 %
LDLC SERPL CALC-MCNC: 104 MG/DL
LYMPHOCYTES # BLD AUTO: 2.3 10E3/UL (ref 0.8–5.3)
LYMPHOCYTES NFR BLD AUTO: 30 %
MCH RBC QN AUTO: 29.9 PG (ref 26.5–33)
MCHC RBC AUTO-ENTMCNC: 34.6 G/DL (ref 31.5–36.5)
MCV RBC AUTO: 87 FL (ref 78–100)
MICROALBUMIN UR-MCNC: 7 MG/L
MICROALBUMIN/CREAT UR: 6.93 MG/G CR (ref 0–17)
MONOCYTES # BLD AUTO: 0.5 10E3/UL (ref 0–1.3)
MONOCYTES NFR BLD AUTO: 7 %
NEUTROPHILS # BLD AUTO: 4.8 10E3/UL (ref 1.6–8.3)
NEUTROPHILS NFR BLD AUTO: 61 %
NONHDLC SERPL-MCNC: 118 MG/DL
NRBC # BLD AUTO: 0 10E3/UL
NRBC BLD AUTO-RTO: 0 /100
PLATELET # BLD AUTO: 235 10E3/UL (ref 150–450)
POTASSIUM BLD-SCNC: 4 MMOL/L (ref 3.4–5.3)
PROT SERPL-MCNC: 7.6 G/DL (ref 6.8–8.8)
PSA SERPL-MCNC: 0.32 UG/L (ref 0–4)
RBC # BLD AUTO: 4.71 10E6/UL (ref 4.4–5.9)
SODIUM SERPL-SCNC: 138 MMOL/L (ref 133–144)
TRIGL SERPL-MCNC: 70 MG/DL
WBC # BLD AUTO: 7.8 10E3/UL (ref 4–11)

## 2022-02-14 PROCEDURE — 83036 HEMOGLOBIN GLYCOSYLATED A1C: CPT

## 2022-02-14 PROCEDURE — 80061 LIPID PANEL: CPT

## 2022-02-14 PROCEDURE — 82043 UR ALBUMIN QUANTITATIVE: CPT

## 2022-02-14 PROCEDURE — G0103 PSA SCREENING: HCPCS

## 2022-02-14 PROCEDURE — 36415 COLL VENOUS BLD VENIPUNCTURE: CPT

## 2022-02-14 PROCEDURE — 85025 COMPLETE CBC W/AUTO DIFF WBC: CPT

## 2022-02-14 PROCEDURE — 99215 OFFICE O/P EST HI 40 MIN: CPT | Mod: 25 | Performed by: FAMILY MEDICINE

## 2022-02-14 PROCEDURE — 90750 HZV VACC RECOMBINANT IM: CPT | Performed by: FAMILY MEDICINE

## 2022-02-14 PROCEDURE — 90471 IMMUNIZATION ADMIN: CPT | Performed by: FAMILY MEDICINE

## 2022-02-14 PROCEDURE — 80053 COMPREHEN METABOLIC PANEL: CPT

## 2022-02-14 RX ORDER — ATORVASTATIN CALCIUM 10 MG/1
10 TABLET, FILM COATED ORAL DAILY
Qty: 90 TABLET | Refills: 3 | Status: SHIPPED | OUTPATIENT
Start: 2022-02-14 | End: 2022-12-20

## 2022-02-14 RX ORDER — METFORMIN HCL 500 MG
1000 TABLET, EXTENDED RELEASE 24 HR ORAL
Qty: 180 TABLET | Refills: 2 | Status: SHIPPED | OUTPATIENT
Start: 2022-02-14 | End: 2022-12-20

## 2022-02-14 RX ORDER — HYDROCHLOROTHIAZIDE 12.5 MG/1
12.5 TABLET ORAL DAILY
Qty: 90 TABLET | Refills: 3 | Status: SHIPPED | OUTPATIENT
Start: 2022-02-14 | End: 2022-12-20

## 2022-02-14 RX ORDER — LOSARTAN POTASSIUM 50 MG/1
50 TABLET ORAL DAILY
Qty: 90 TABLET | Refills: 3 | Status: SHIPPED | OUTPATIENT
Start: 2022-02-14 | End: 2022-12-20

## 2022-02-14 ASSESSMENT — ANXIETY QUESTIONNAIRES
5. BEING SO RESTLESS THAT IT IS HARD TO SIT STILL: NOT AT ALL
4. TROUBLE RELAXING: NOT AT ALL
6. BECOMING EASILY ANNOYED OR IRRITABLE: SEVERAL DAYS
GAD7 TOTAL SCORE: 3
1. FEELING NERVOUS, ANXIOUS, OR ON EDGE: NOT AT ALL
2. NOT BEING ABLE TO STOP OR CONTROL WORRYING: NOT AT ALL
7. FEELING AFRAID AS IF SOMETHING AWFUL MIGHT HAPPEN: SEVERAL DAYS
IF YOU CHECKED OFF ANY PROBLEMS ON THIS QUESTIONNAIRE, HOW DIFFICULT HAVE THESE PROBLEMS MADE IT FOR YOU TO DO YOUR WORK, TAKE CARE OF THINGS AT HOME, OR GET ALONG WITH OTHER PEOPLE: NOT DIFFICULT AT ALL
3. WORRYING TOO MUCH ABOUT DIFFERENT THINGS: SEVERAL DAYS

## 2022-02-14 ASSESSMENT — PAIN SCALES - GENERAL: PAINLEVEL: NO PAIN (0)

## 2022-02-14 ASSESSMENT — PATIENT HEALTH QUESTIONNAIRE - PHQ9: SUM OF ALL RESPONSES TO PHQ QUESTIONS 1-9: 5

## 2022-02-14 NOTE — PROGRESS NOTES
SUBJECTIVE:   CC: Glen Rogers is an 56 year old male who presents for preventative health visit.       Patient has been advised of split billing requirements and indicates understanding: Yes    Diabetes Follow-up      How often are you checking your blood sugar? Not at all    What concerns do you have today about your diabetes? None     Do you have any of these symptoms? (Select all that apply)  No numbness or tingling in feet.  No redness, sores or blisters on feet.  No complaints of excessive thirst.  No reports of blurry vision.  No significant changes to weight.    Have you had a diabetic eye exam in the last 12 months? No        {Reference  Diabetes Management Resources :773235}    {Reference  Diabetes Log - 7 days :226484}    Hyperlipidemia Follow-Up      Are you regularly taking any medication or supplement to lower your cholesterol?   Yes- atorvastatin    Are you having muscle aches or other side effects that you think could be caused by your cholesterol lowering medication?  No    Hypertension Follow-up      Do you check your blood pressure regularly outside of the clinic? No     Are you following a low salt diet? No    Are your blood pressures ever more than 140 on the top number (systolic) OR more   than 90 on the bottom number (diastolic), for example 140/90? No    BP Readings from Last 2 Encounters:   08/11/21 126/68   02/10/21 114/70     Hemoglobin A1C POCT (%)   Date Value   02/10/2021 6.4 (H)   08/10/2020 6.4 (H)     Hemoglobin A1C (%)   Date Value   08/11/2021 6.6 (H)     LDL Cholesterol Calculated (mg/dL)   Date Value   02/10/2021 94   01/15/2020 115 (H)         How many servings of fruits and vegetables do you eat daily?  0-1    On average, how many sweetened beverages do you drink each day (Examples: soda, juice, sweet tea, etc.  Do NOT count diet or artificially sweetened beverages)?   0    How many days per week do you exercise enough to make your heart beat faster? 3 or less    How many  "minutes a day do you exercise enough to make your heart beat faster? 9 or less    How many days per week do you miss taking your medication? 0    {additonal problems for provider to add (Optional):921159}        Today's PHQ-2 Score:   PHQ-2 ( 1999 [employer]) 2/14/2022   Q1: Little interest or pleasure in doing things 1   Q2: Feeling down, depressed or hopeless 0   PHQ-2 Score 1   PHQ-2 Total Score (12-17 Years)- Positive if 3 or more points; Administer PHQ-A if positive -   Q1: Little interest or pleasure in doing things Several days   Q2: Feeling down, depressed or hopeless Not at all   PHQ-2 Score 1       Abuse: Current or Past(Physical, Sexual or Emotional)- No  Do you feel safe in your environment? Yes    Have you ever done Advance Care Planning? (For example, a Health Directive, POLST, or a discussion with a medical provider or your loved ones about your wishes): No, advance care planning information given to patient to review.  Patient declined advance care planning discussion at this time.    Social History     Tobacco Use     Smoking status: Never Smoker     Smokeless tobacco: Never Used     Tobacco comment: Does have occasional cigar    Substance Use Topics     Alcohol use: Yes     Alcohol/week: 0.0 standard drinks     Comment: social     If you drink alcohol do you typically have >3 drinks per day or >7 drinks per week? Yes  {Complete AUDIT in Assessments section of the Visit Navigator and Refresh}    Alcohol Use 2/14/2022   Prescreen: >3 drinks/day or >7 drinks/week? Yes   AUDIT SCORE  6       Last PSA:   PSA   Date Value Ref Range Status   02/10/2021 0.24 0 - 4 ug/L Final     Comment:     Assay Method:  Chemiluminescence using Siemens Vista analyzer     Prostate Specific Antigen Screen   Date Value Ref Range Status   02/14/2022 0.32 0.00 - 4.00 ug/L Final       Reviewed orders with patient. Reviewed health maintenance and updated orders accordingly - { :888070::\"Yes\"}  {Chronicprobdata " "(optional):606378}    Reviewed and updated as needed this visit by clinical staff  Tobacco  Allergies  Meds   Med Hx  Surg Hx  Fam Hx  Soc Hx       Reviewed and updated as needed this visit by Provider               {HISTORY OPTIONS (Optional):250181}    Review of Systems  {MALE ROS (Optional):523565::\"CONSTITUTIONAL: NEGATIVE for fever, chills, change in weight\",\"INTEGUMENTARY/SKIN: NEGATIVE for worrisome rashes, moles or lesions\",\"EYES: NEGATIVE for vision changes or irritation\",\"ENT: NEGATIVE for ear, mouth and throat problems\",\"RESP: NEGATIVE for significant cough or SOB\",\"CV: NEGATIVE for chest pain, palpitations or peripheral edema\",\"GI: NEGATIVE for nausea, abdominal pain, heartburn, or change in bowel habits\",\" male: negative for dysuria, hematuria, decreased urinary stream, erectile dysfunction, urethral discharge\",\"MUSCULOSKELETAL: NEGATIVE for significant arthralgias or myalgia\",\"NEURO: NEGATIVE for weakness, dizziness or paresthesias\",\"PSYCHIATRIC: NEGATIVE for changes in mood or affect\"}    OBJECTIVE:   /87 (BP Location: Right arm, Patient Position: Sitting, Cuff Size: Adult Regular)   Pulse 75   Temp 97.8  F (36.6  C) (Tympanic)   Resp 20   Wt (!) 156.5 kg (345 lb)   SpO2 98%   BMI 46.79 kg/m      Physical Exam  {Exam Choices (Optional):288241}    {Diagnostic Test Results (Optional):249446::\"Diagnostic Test Results:\",\"Labs reviewed in Epic\"}    ASSESSMENT/PLAN:   {Diag Picklist:308493}    {Patient advised of split billing (Optional):211652}    COUNSELING:   {MALE COUNSELING MESSAGES:499817::\"Reviewed preventive health counseling, as reflected in patient instructions\"}    Estimated body mass index is 46.79 kg/m  as calculated from the following:    Height as of 8/11/21: 1.829 m (6').    Weight as of this encounter: 156.5 kg (345 lb).     {Weight Management Plan (ACO) Complete if BMI is abnormal-  Ages 18-64  BMI >24.9.  Age 65+ with BMI <23 or >30 (Optional):744459}    He reports " that he has never smoked. He has never used smokeless tobacco.      Counseling Resources:  ATP IV Guidelines  Pooled Cohorts Equation Calculator  FRAX Risk Assessment  ICSI Preventive Guidelines  Dietary Guidelines for Americans, 2010  USDA's MyPlate  ASA Prophylaxis  Lung CA Screening    Zak Schrader MD  [facility] [place]

## 2022-02-14 NOTE — PROGRESS NOTES
SUBJECTIVE:   CC: Glen Rogers is an 56 year old male who presents for preventative health visit.       Patient has been advised of split billing requirements and indicates understanding: Yes  Imm/Inj    Healthy Habits:     Getting at least 3 servings of Calcium per day:  Yes    Bi-annual eye exam:  Yes    Dental care twice a year:  Yes    Sleep apnea or symptoms of sleep apnea:  Daytime drowsiness and Sleep apnea    Diet:  Diabetic    Frequency of exercise:  None    Taking medications regularly:  Yes    Medication side effects:  None    PHQ-2 Total Score: 1    Additional concerns today:  No          Diabetes Follow-up      How often are you checking your blood sugar? Not at all    What concerns do you have today about your diabetes? None     Do you have any of these symptoms? (Select all that apply)  No numbness or tingling in feet.  No redness, sores or blisters on feet.  No complaints of excessive thirst.  No reports of blurry vision.  No significant changes to weight.    Have you had a diabetic eye exam in the last 12 months? No        {Reference  Diabetes Management Resources :205454}        Hyperlipidemia Follow-Up      Are you regularly taking any medication or supplement to lower your cholesterol?   Yes- atorvastatin    Are you having muscle aches or other side effects that you think could be caused by your cholesterol lowering medication?  No    Hypertension Follow-up      Do you check your blood pressure regularly outside of the clinic? No     Are you following a low salt diet? No    Are your blood pressures ever more than 140 on the top number (systolic) OR more   than 90 on the bottom number (diastolic), for example 140/90? No    BP Readings from Last 2 Encounters:   02/14/22 132/87   08/11/21 126/68     Hemoglobin A1C POCT (%)   Date Value   02/10/2021 6.4 (H)   08/10/2020 6.4 (H)     Hemoglobin A1C (%)   Date Value   02/14/2022 6.9 (H)   08/11/2021 6.6 (H)     LDL Cholesterol Calculated (mg/dL)    Date Value   02/14/2022 104 (H)   02/10/2021 94   01/15/2020 115 (H)         Today's PHQ-2 Score:   PHQ-2 ( 1999 [employer]) 2/14/2022   Q1: Little interest or pleasure in doing things 1   Q2: Feeling down, depressed or hopeless 0   PHQ-2 Score 1   PHQ-2 Total Score (12-17 Years)- Positive if 3 or more points; Administer PHQ-A if positive -   Q1: Little interest or pleasure in doing things Several days   Q2: Feeling down, depressed or hopeless Not at all   PHQ-2 Score 1       Abuse: Current or Past(Physical, Sexual or Emotional)- No  Do you feel safe in your environment? Yes        Social History     Tobacco Use     Smoking status: Never Smoker     Smokeless tobacco: Never Used     Tobacco comment: Does have occasional cigar    Substance Use Topics     Alcohol use: Yes     Alcohol/week: 0.0 standard drinks     Comment: social     If you drink alcohol do you typically have >3 drinks per day or >7 drinks per week? Yes      Alcohol Use 2/14/2022   Prescreen: >3 drinks/day or >7 drinks/week? Yes   AUDIT SCORE  6       Last PSA:   PSA   Date Value Ref Range Status   02/10/2021 0.24 0 - 4 ug/L Final     Comment:     Assay Method:  Chemiluminescence using Siemens Vista analyzer     Prostate Specific Antigen Screen   Date Value Ref Range Status   02/14/2022 0.32 0.00 - 4.00 ug/L Final       Reviewed orders with patient. Reviewed health maintenance and updated orders accordingly - Yes  Labs reviewed in EPIC    Reviewed and updated as needed this visit by clinical staff  Tobacco  Allergies  Meds   Med Hx  Surg Hx  Fam Hx  Soc Hx       Reviewed and updated as needed this visit by Provider               Past Medical History:   Diagnosis Date     Diabetes mellitus, type 2 (H) 3/18/2015     Essential hypertension 7/20/2015     FH: colon cancer     father     HTN (hypertension)     borderline     Obesity      GIULIANA on CPAP      PONV (postoperative nausea and vomiting)      Type 2 diabetes mellitus without complication (H)  7/20/2015      Past Surgical History:   Procedure Laterality Date     ARTHROPLASTY HIP ANTERIOR Left 3/3/2020    Procedure: DIRECT ANTERIOR LEFT TOTAL HIP ARTHROPLASTY;  Surgeon: Can Bryant MD;  Location: [facility]     COLONOSCOPY N/A 5/22/2015    Repeat in 2020??/Procedure: COLONOSCOPY;  Surgeon: Ousmane Eddy DO;  Location: [facility] OR     ENT SURGERY      tonsillectomy     IR CONSULTATION FOR IR EXAM  8/19/2020       Review of Systems  CONSTITUTIONAL: NEGATIVE for fever, chills, change in weight  INTEGUMENTARY/SKIN: NEGATIVE for worrisome rashes, moles or lesions  EYES: NEGATIVE for vision changes or irritation  ENT: NEGATIVE for ear, mouth and throat problems  RESP: NEGATIVE for significant cough or SOB  CV: NEGATIVE for chest pain, palpitations or peripheral edema  GI: NEGATIVE for nausea, abdominal pain, heartburn, or change in bowel habits   male: negative for dysuria, hematuria, decreased urinary stream, erectile dysfunction, urethral discharge  MUSCULOSKELETAL: NEGATIVE for significant arthralgias or myalgia  NEURO: NEGATIVE for weakness, dizziness or paresthesias  PSYCHIATRIC: NEGATIVE for changes in mood or affect    OBJECTIVE:   /87 (BP Location: Right arm, Patient Position: Sitting, Cuff Size: Adult Regular)   Pulse 75   Temp 97.8  F (36.6  C) (Tympanic)   Resp 20   Wt (!) 156.5 kg (345 lb)   SpO2 98%   BMI 46.79 kg/m      Physical Exam  GENERAL: healthy, alert and no distress  EYES: Eyes grossly normal to inspection, PERRL and conjunctivae and sclerae normal  HENT: ear canals and TM's normal, nose and mouth without ulcers or lesions  NECK: no adenopathy, no asymmetry, masses, or scars and thyroid normal to palpation  RESP: lungs clear to auscultation - no rales, rhonchi or wheezes  CV: regular rate and rhythm, normal S1 S2, no S3 or S4, no murmur, click or rub, no peripheral edema and peripheral pulses strong  ABDOMEN: soft, nontender, no hepatosplenomegaly, no masses and bowel sounds  normal   (male): normal male genitalia without lesions or urethral discharge, no hernia  MS: no gross musculoskeletal defects noted, no edema  SKIN: no suspicious lesions or rashes  NEURO: Normal strength and tone, mentation intact and speech normal  PSYCH: mentation appears normal, affect normal/bright    Results for orders placed or performed in visit on 02/14/22   Comprehensive metabolic panel     Status: Abnormal   Result Value Ref Range    Sodium 138 133 - 144 mmol/L    Potassium 4.0 3.4 - 5.3 mmol/L    Chloride 105 94 - 109 mmol/L    Carbon Dioxide (CO2) 27 20 - 32 mmol/L    Anion Gap 6 3 - 14 mmol/L    Urea Nitrogen 17 7 - 30 mg/dL    Creatinine 0.77 0.66 - 1.25 mg/dL    Calcium 9.2 8.5 - 10.1 mg/dL    Glucose 111 (H) 70 - 99 mg/dL    Alkaline Phosphatase 76 40 - 150 U/L    AST 21 0 - 45 U/L    ALT 56 0 - 70 U/L    Protein Total 7.6 6.8 - 8.8 g/dL    Albumin 4.2 3.4 - 5.0 g/dL    Bilirubin Total 0.5 0.2 - 1.3 mg/dL    GFR Estimate >90 >60 mL/min/1.73m2   Lipid Profile     Status: Abnormal   Result Value Ref Range    Cholesterol 169 <200 mg/dL    Triglycerides 70 <150 mg/dL    Direct Measure HDL 51 >=40 mg/dL    LDL Cholesterol Calculated 104 (H) <=100 mg/dL    Non HDL Cholesterol 118 <130 mg/dL    Patient Fasting > 8hrs? Yes     Narrative    Cholesterol  Desirable:  <200 mg/dL    Triglycerides  Normal:  Less than 150 mg/dL  Borderline High:  150-199 mg/dL  High:  200-499 mg/dL  Very High:  Greater than or equal to 500 mg/dL    Direct Measure HDL  Female:  Greater than or equal to 50 mg/dL   Male:  Greater than or equal to 40 mg/dL    LDL Cholesterol  Desirable:  <100mg/dL  Above Desirable:  100-129 mg/dL   Borderline High:  130-159 mg/dL   High:  160-189 mg/dL   Very High:  >= 190 mg/dL    Non HDL Cholesterol  Desirable:  130 mg/dL  Above Desirable:  130-159 mg/dL  Borderline High:  160-189 mg/dL  High:  190-219 mg/dL  Very High:  Greater than or equal to 220 mg/dL   Hemoglobin A1c     Status: Abnormal    Result Value Ref Range    Estimated Average Glucose 151 mg/dL    Hemoglobin A1C 6.9 (H) 0.0 - 5.6 %   Prostate Specific Antigen Screen     Status: Normal   Result Value Ref Range    Prostate Specific Antigen Screen 0.32 0.00 - 4.00 ug/L    Narrative    Assay Method:  Chemiluminescence using Siemens   Vista analyzer.   CBC with platelets and differential     Status: None   Result Value Ref Range    WBC Count 7.8 4.0 - 11.0 10e3/uL    RBC Count 4.71 4.40 - 5.90 10e6/uL    Hemoglobin 14.1 13.3 - 17.7 g/dL    Hematocrit 40.8 40.0 - 53.0 %    MCV 87 78 - 100 fL    MCH 29.9 26.5 - 33.0 pg    MCHC 34.6 31.5 - 36.5 g/dL    RDW 13.7 10.0 - 15.0 %    Platelet Count 235 150 - 450 10e3/uL    % Neutrophils 61 %    % Lymphocytes 30 %    % Monocytes 7 %    % Eosinophils 2 %    % Basophils 0 %    % Immature Granulocytes 0 %    NRBCs per 100 WBC 0 <1 /100    Absolute Neutrophils 4.8 1.6 - 8.3 10e3/uL    Absolute Lymphocytes 2.3 0.8 - 5.3 10e3/uL    Absolute Monocytes 0.5 0.0 - 1.3 10e3/uL    Absolute Eosinophils 0.1 0.0 - 0.7 10e3/uL    Absolute Basophils 0.0 0.0 - 0.2 10e3/uL    Absolute Immature Granulocytes 0.0 <=0.4 10e3/uL    Absolute NRBCs 0.0 10e3/uL   CBC with Platelets & Differential     Status: None    Narrative    The following orders were created for panel order CBC with Platelets & Differential.  Procedure                               Abnormality         Status                     ---------                               -----------         ------                     CBC with platelets and d...[[id number]]                      Final result                 Please view results for these tests on the individual orders.         ASSESSMENT/PLAN:   (E11.9) Type 2 diabetes mellitus without complication, without long-term current use of insulin (H)  (primary encounter diagnosis)  Comment: good control - A`C up little -- discussed. No change in meds. Continue current medications and behavioral changes. Refer for eye exam.    Follow-up in 4-5 months   Plan: CBC with Platelets & Differential,         Comprehensive metabolic panel, Lipid Profile,         Hemoglobin A1c, Albumin Random Urine         Quantitative with Creat Ratio, Adult Eye         Referral, atorvastatin (LIPITOR) 10 MG tablet,         losartan (COZAAR) 50 MG tablet, metFORMIN         (GLUCOPHAGE-XR) 500 MG 24 hr tablet            (E78.00) Pure hypercholesterolemia  Comment: stable on statin   Plan: CBC with Platelets & Differential,         Comprehensive metabolic panel, Lipid Profile,         Hemoglobin A1c, atorvastatin (LIPITOR) 10 MG         tablet        Continue current medications and behavioral changes.     (I10) Essential hypertension with goal blood pressure less than 140/90  Comment: stable on meds. Continue current medications and behavioral changes.   Plan: CBC with Platelets & Differential,         Comprehensive metabolic panel, Lipid Profile,         Hemoglobin A1c, hydrochlorothiazide         (HYDRODIURIL) 12.5 MG tablet, losartan (COZAAR)        50 MG tablet            (E66.01) Morbid obesity due to excess calories (H)  Comment: pt is aware .   Plan: CBC with Platelets & Differential,         Comprehensive metabolic panel, Lipid Profile,         Hemoglobin A1c            (Z12.5) Screening for prostate cancer  Comment: psa ok   Plan: Prostate Specific Antigen Screen            (Z71.85) Vaccine counseling  Comment: discussed flu shot and covid.   Plan: pt declines flu shot this year and continues to refuse covid shot. Pt is aware of his high risk for morbidity and mortality     (Z23) Need for vaccination  Comment: discussed. Agreed on Shingix  Plan: SHINGRIX [[id number]]            (Z23) Need for prophylactic vaccination and inoculation against influenza  Comment:  Plan:as above declined this year     Refilled all his meds.     COUNSELING:   Reviewed preventive health counseling, as reflected in patient instructions       Regular exercise       Healthy  diet/nutrition       Colorectal cancer screening       Prostate cancer screening    Estimated body mass index is 46.79 kg/m  as calculated from the following:    Height as of 8/11/21: 1.829 m (6').    Weight as of this encounter: 156.5 kg (345 lb).     Weight management plan: Discussed healthy diet and exercise guidelines    He reports that he has never smoked. He has never used smokeless tobacco.      Counseling Resources:  ATP IV Guidelines  Pooled Cohorts Equation Calculator  FRAX Risk Assessment  ICSI Preventive Guidelines  Dietary Guidelines for Americans, 2010  USDA's MyPlate  ASA Prophylaxis  Lung CA Screening    Zak Schrader MD  [facility] [place]

## 2022-02-14 NOTE — NURSING NOTE
Chief Complaint   Patient presents with     Imm/Inj     Flu Shot     Physical       Initial /87 (BP Location: Right arm, Patient Position: Sitting, Cuff Size: Adult Regular)   Pulse 75   Temp 97.8  F (36.6  C) (Tympanic)   Resp 20   Wt (!) 156.5 kg (345 lb)   SpO2 98%   BMI 46.79 kg/m   Estimated body mass index is 46.79 kg/m  as calculated from the following:    Height as of 8/11/21: 1.829 m (6').    Weight as of this encounter: 156.5 kg (345 lb).  Medication Reconciliation: complete  Luciana Caldwell LPN

## 2022-02-15 ASSESSMENT — ANXIETY QUESTIONNAIRES: GAD7 TOTAL SCORE: 3

## 2022-02-24 DIAGNOSIS — Z96.642 HISTORY OF TOTAL LEFT HIP ARTHROPLASTY: ICD-10-CM

## 2022-02-24 DIAGNOSIS — M79.662 PAIN OF LEFT LOWER LEG: ICD-10-CM

## 2022-02-24 RX ORDER — DICLOFENAC SODIUM 75 MG/1
TABLET, DELAYED RELEASE ORAL
Qty: 60 TABLET | Refills: 3 | Status: SHIPPED | OUTPATIENT
Start: 2022-02-24 | End: 2022-09-23

## 2022-04-26 ENCOUNTER — TRANSFERRED RECORDS (OUTPATIENT)
Dept: HEALTH INFORMATION MANAGEMENT | Facility: HOSPITAL | Age: 57
End: 2022-04-26
Payer: COMMERCIAL

## 2022-04-26 LAB — RETINOPATHY: NEGATIVE

## 2022-05-27 ENCOUNTER — OFFICE VISIT (OUTPATIENT)
Dept: FAMILY MEDICINE | Facility: OTHER | Age: 57
End: 2022-05-27
Attending: FAMILY MEDICINE
Payer: COMMERCIAL

## 2022-05-27 ENCOUNTER — LAB (OUTPATIENT)
Dept: LAB | Facility: OTHER | Age: 57
End: 2022-05-27
Attending: FAMILY MEDICINE
Payer: COMMERCIAL

## 2022-05-27 VITALS
WEIGHT: 315 LBS | RESPIRATION RATE: 18 BRPM | TEMPERATURE: 97.6 F | OXYGEN SATURATION: 98 % | DIASTOLIC BLOOD PRESSURE: 88 MMHG | SYSTOLIC BLOOD PRESSURE: 138 MMHG | BODY MASS INDEX: 46.38 KG/M2 | HEART RATE: 77 BPM

## 2022-05-27 DIAGNOSIS — E11.9 TYPE 2 DIABETES MELLITUS WITHOUT COMPLICATION, WITHOUT LONG-TERM CURRENT USE OF INSULIN (H): ICD-10-CM

## 2022-05-27 DIAGNOSIS — Z23 NEED FOR VACCINATION: Primary | ICD-10-CM

## 2022-05-27 LAB
EST. AVERAGE GLUCOSE BLD GHB EST-MCNC: 148 MG/DL
HBA1C MFR BLD: 6.8 % (ref 0–5.6)

## 2022-05-27 PROCEDURE — 90750 HZV VACC RECOMBINANT IM: CPT | Performed by: FAMILY MEDICINE

## 2022-05-27 PROCEDURE — 99213 OFFICE O/P EST LOW 20 MIN: CPT | Mod: 25 | Performed by: FAMILY MEDICINE

## 2022-05-27 PROCEDURE — 36415 COLL VENOUS BLD VENIPUNCTURE: CPT

## 2022-05-27 PROCEDURE — 83036 HEMOGLOBIN GLYCOSYLATED A1C: CPT

## 2022-05-27 PROCEDURE — 90471 IMMUNIZATION ADMIN: CPT | Performed by: FAMILY MEDICINE

## 2022-05-27 ASSESSMENT — PAIN SCALES - GENERAL: PAINLEVEL: MILD PAIN (2)

## 2022-05-27 NOTE — NURSING NOTE
Chief Complaint   Patient presents with     Recheck Medication       Initial BP (!) 148/80 (BP Location: Right arm, Patient Position: Sitting, Cuff Size: Adult Large)   Pulse 77   Temp 97.6  F (36.4  C) (Tympanic)   Resp 18   Wt (!) 155.1 kg (342 lb)   SpO2 98%   BMI 46.38 kg/m   Estimated body mass index is 46.38 kg/m  as calculated from the following:    Height as of 8/11/21: 1.829 m (6').    Weight as of this encounter: 155.1 kg (342 lb).  Medication Reconciliation: complete  Luciana Caldwell LPN

## 2022-05-27 NOTE — PROGRESS NOTES
Assessment & Plan     Type 2 diabetes mellitus without complication, without long-term current use of insulin (H)  Good control. Continue current medications and behavioral changes.   Follow-up in 6 months - fasting   - Hemoglobin A1c; Future  - [facility] FOOT EXAM  NO CHARGE    Need for vaccination  Second shot given   - SHINGRIX [[id number]]                 No follow-ups on file.    Zak Schrader MD  [facility] - JEFF Carroll is a 56 year old who presents for the following health issues     HPI     Diabetes Follow-up    How often are you checking your blood sugar? A few times a month  What time of day are you checking your blood sugars (select all that apply)?  Before meals  Have you had any blood sugars above 200?  No  Have you had any blood sugars below 70?  No    What symptoms do you notice when your blood sugar is low?  None    What concerns do you have today about your diabetes? None     Do you have any of these symptoms? (Select all that apply)  No numbness or tingling in feet.  No redness, sores or blisters on feet.  No complaints of excessive thirst.  No reports of blurry vision.  No significant changes to weight.    1. foot deformity   No  2. Current or previous foot ulceration     No  3. Current or previous pre-ulcerative calluses     No  4. previous partial amputation of one or both feet or complete amputation of one foot     No  5. peripheral neuropathy with evidence of callus formation     No  6. poor circulation     No      BP Readings from Last 2 Encounters:   02/14/22 132/87   08/11/21 126/68     Hemoglobin A1C POCT (%)   Date Value   02/10/2021 6.4 (H)   08/10/2020 6.4 (H)     Hemoglobin A1C (%)   Date Value   02/14/2022 6.9 (H)   08/11/2021 6.6 (H)     LDL Cholesterol Calculated (mg/dL)   Date Value   02/14/2022 104 (H)   02/10/2021 94   01/15/2020 115 (H)                 Review of Systems   Constitutional, HEENT, cardiovascular, pulmonary, gi and gu systems are  negative, except as otherwise noted.      Objective    /88   Pulse 77   Temp 97.6  F (36.4  C) (Tympanic)   Resp 18   Wt (!) 155.1 kg (342 lb)   SpO2 98%   BMI 46.38 kg/m    Body mass index is 46.38 kg/m .  Physical Exam   GENERAL: healthy, alert and no distress  NECK: no adenopathy, no asymmetry, masses, or scars and thyroid normal to palpation  RESP: lungs clear to auscultation - no rales, rhonchi or wheezes  CV: regular rate and rhythm, normal S1 S2, no S3 or S4, no murmur, click or rub, no peripheral edema and peripheral pulses strong  ABDOMEN: soft, nontender, no hepatosplenomegaly, no masses and bowel sounds normal  MS: no gross musculoskeletal defects noted, no edema  Diabetic foot exam: normal DP and PT pulses, no trophic changes or ulcerative lesions, normal sensory exam and normal monofilament exam    Results for orders placed or performed in visit on 05/27/22   Hemoglobin A1c     Status: Abnormal   Result Value Ref Range    Estimated Average Glucose 148 mg/dL    Hemoglobin A1C 6.8 (H) 0.0 - 5.6 %

## 2022-09-23 DIAGNOSIS — M79.662 PAIN OF LEFT LOWER LEG: ICD-10-CM

## 2022-09-23 DIAGNOSIS — Z96.642 HISTORY OF TOTAL LEFT HIP ARTHROPLASTY: ICD-10-CM

## 2022-09-23 RX ORDER — DICLOFENAC SODIUM 75 MG/1
TABLET, DELAYED RELEASE ORAL
Qty: 60 TABLET | Refills: 3 | Status: SHIPPED | OUTPATIENT
Start: 2022-09-23 | End: 2022-12-20

## 2022-11-29 ENCOUNTER — TELEPHONE (OUTPATIENT)
Dept: FAMILY MEDICINE | Facility: OTHER | Age: 57
End: 2022-11-29

## 2022-11-29 NOTE — TELEPHONE ENCOUNTER
Attempt # 1  Outcome: Left Message   Comment: LM for patient to return call to clinic to reschedule MINI PX / DM F/U - with fasting labs with Dr. Schrader as patient missed appointment on 11/28/22.

## 2022-12-20 ENCOUNTER — TELEPHONE (OUTPATIENT)
Dept: FAMILY MEDICINE | Facility: OTHER | Age: 57
End: 2022-12-20

## 2022-12-20 ENCOUNTER — LAB (OUTPATIENT)
Dept: LAB | Facility: OTHER | Age: 57
End: 2022-12-20
Attending: FAMILY MEDICINE
Payer: COMMERCIAL

## 2022-12-20 ENCOUNTER — OFFICE VISIT (OUTPATIENT)
Dept: FAMILY MEDICINE | Facility: OTHER | Age: 57
End: 2022-12-20
Attending: FAMILY MEDICINE
Payer: COMMERCIAL

## 2022-12-20 VITALS
OXYGEN SATURATION: 98 % | BODY MASS INDEX: 42.66 KG/M2 | TEMPERATURE: 97.5 F | RESPIRATION RATE: 14 BRPM | DIASTOLIC BLOOD PRESSURE: 86 MMHG | HEART RATE: 80 BPM | SYSTOLIC BLOOD PRESSURE: 132 MMHG | HEIGHT: 72 IN | WEIGHT: 315 LBS

## 2022-12-20 DIAGNOSIS — M51.369 DDD (DEGENERATIVE DISC DISEASE), LUMBAR: ICD-10-CM

## 2022-12-20 DIAGNOSIS — E66.01 MORBID OBESITY DUE TO EXCESS CALORIES (H): ICD-10-CM

## 2022-12-20 DIAGNOSIS — I10 ESSENTIAL HYPERTENSION WITH GOAL BLOOD PRESSURE LESS THAN 140/90: ICD-10-CM

## 2022-12-20 DIAGNOSIS — M79.662 PAIN OF LEFT LOWER LEG: ICD-10-CM

## 2022-12-20 DIAGNOSIS — E11.9 TYPE 2 DIABETES MELLITUS WITHOUT COMPLICATION, WITHOUT LONG-TERM CURRENT USE OF INSULIN (H): ICD-10-CM

## 2022-12-20 DIAGNOSIS — M16.11 PRIMARY OSTEOARTHRITIS OF RIGHT HIP: ICD-10-CM

## 2022-12-20 DIAGNOSIS — Z12.5 SCREENING FOR PROSTATE CANCER: ICD-10-CM

## 2022-12-20 DIAGNOSIS — E78.00 PURE HYPERCHOLESTEROLEMIA: ICD-10-CM

## 2022-12-20 DIAGNOSIS — Z71.85 VACCINE COUNSELING: ICD-10-CM

## 2022-12-20 DIAGNOSIS — Z96.642 HISTORY OF TOTAL LEFT HIP ARTHROPLASTY: ICD-10-CM

## 2022-12-20 DIAGNOSIS — Z80.0 FH: COLON CANCER: ICD-10-CM

## 2022-12-20 DIAGNOSIS — E11.9 TYPE 2 DIABETES MELLITUS WITHOUT COMPLICATION, WITHOUT LONG-TERM CURRENT USE OF INSULIN (H): Primary | ICD-10-CM

## 2022-12-20 LAB
ALBUMIN SERPL BCG-MCNC: 4.4 G/DL (ref 3.5–5.2)
ALP SERPL-CCNC: 81 U/L (ref 40–129)
ALT SERPL W P-5'-P-CCNC: 44 U/L (ref 10–50)
ANION GAP SERPL CALCULATED.3IONS-SCNC: 13 MMOL/L (ref 7–15)
AST SERPL W P-5'-P-CCNC: 22 U/L (ref 10–50)
BASOPHILS # BLD AUTO: 0 10E3/UL (ref 0–0.2)
BASOPHILS NFR BLD AUTO: 0 %
BILIRUB SERPL-MCNC: 0.2 MG/DL
BUN SERPL-MCNC: 15.7 MG/DL (ref 6–20)
CALCIUM SERPL-MCNC: 9.4 MG/DL (ref 8.6–10)
CHLORIDE SERPL-SCNC: 104 MMOL/L (ref 98–107)
CHOLEST SERPL-MCNC: 177 MG/DL
CREAT SERPL-MCNC: 0.79 MG/DL (ref 0.67–1.17)
CREAT UR-MCNC: 107.9 MG/DL
DEPRECATED HCO3 PLAS-SCNC: 23 MMOL/L (ref 22–29)
EOSINOPHIL # BLD AUTO: 0.2 10E3/UL (ref 0–0.7)
EOSINOPHIL NFR BLD AUTO: 2 %
ERYTHROCYTE [DISTWIDTH] IN BLOOD BY AUTOMATED COUNT: 13.6 % (ref 10–15)
EST. AVERAGE GLUCOSE BLD GHB EST-MCNC: 186 MG/DL
GFR SERPL CREATININE-BSD FRML MDRD: >90 ML/MIN/1.73M2
GLUCOSE SERPL-MCNC: 221 MG/DL (ref 70–99)
HBA1C MFR BLD: 8.1 %
HCT VFR BLD AUTO: 40.9 % (ref 40–53)
HDLC SERPL-MCNC: 42 MG/DL
HGB BLD-MCNC: 14 G/DL (ref 13.3–17.7)
IMM GRANULOCYTES # BLD: 0 10E3/UL
IMM GRANULOCYTES NFR BLD: 0 %
LDLC SERPL CALC-MCNC: 114 MG/DL
LYMPHOCYTES # BLD AUTO: 2.2 10E3/UL (ref 0.8–5.3)
LYMPHOCYTES NFR BLD AUTO: 29 %
MCH RBC QN AUTO: 30.2 PG (ref 26.5–33)
MCHC RBC AUTO-ENTMCNC: 34.2 G/DL (ref 31.5–36.5)
MCV RBC AUTO: 88 FL (ref 78–100)
MICROALBUMIN UR-MCNC: <12 MG/L
MICROALBUMIN/CREAT UR: NORMAL MG/G{CREAT}
MONOCYTES # BLD AUTO: 0.7 10E3/UL (ref 0–1.3)
MONOCYTES NFR BLD AUTO: 9 %
NEUTROPHILS # BLD AUTO: 4.5 10E3/UL (ref 1.6–8.3)
NEUTROPHILS NFR BLD AUTO: 60 %
NONHDLC SERPL-MCNC: 135 MG/DL
NRBC # BLD AUTO: 0 10E3/UL
NRBC BLD AUTO-RTO: 0 /100
PLATELET # BLD AUTO: 240 10E3/UL (ref 150–450)
POTASSIUM SERPL-SCNC: 4.3 MMOL/L (ref 3.4–5.3)
PROT SERPL-MCNC: 7.3 G/DL (ref 6.4–8.3)
PSA SERPL-MCNC: 0.14 NG/ML (ref 0–3.5)
RBC # BLD AUTO: 4.64 10E6/UL (ref 4.4–5.9)
SODIUM SERPL-SCNC: 140 MMOL/L (ref 136–145)
TRIGL SERPL-MCNC: 103 MG/DL
TSH SERPL DL<=0.005 MIU/L-ACNC: 0.99 UIU/ML (ref 0.3–4.2)
WBC # BLD AUTO: 7.5 10E3/UL (ref 4–11)

## 2022-12-20 PROCEDURE — G0103 PSA SCREENING: HCPCS

## 2022-12-20 PROCEDURE — 82043 UR ALBUMIN QUANTITATIVE: CPT

## 2022-12-20 PROCEDURE — 83036 HEMOGLOBIN GLYCOSYLATED A1C: CPT

## 2022-12-20 PROCEDURE — 99214 OFFICE O/P EST MOD 30 MIN: CPT | Performed by: FAMILY MEDICINE

## 2022-12-20 PROCEDURE — 36415 COLL VENOUS BLD VENIPUNCTURE: CPT

## 2022-12-20 PROCEDURE — 80050 GENERAL HEALTH PANEL: CPT

## 2022-12-20 PROCEDURE — 80061 LIPID PANEL: CPT

## 2022-12-20 RX ORDER — LOSARTAN POTASSIUM 50 MG/1
50 TABLET ORAL DAILY
Qty: 90 TABLET | Refills: 3 | Status: SHIPPED | OUTPATIENT
Start: 2022-12-20 | End: 2023-11-21

## 2022-12-20 RX ORDER — DICLOFENAC SODIUM 75 MG/1
75 TABLET, DELAYED RELEASE ORAL 2 TIMES DAILY
Qty: 60 TABLET | Refills: 4 | Status: SHIPPED | OUTPATIENT
Start: 2022-12-20 | End: 2023-07-20

## 2022-12-20 RX ORDER — METFORMIN HCL 500 MG
1000 TABLET, EXTENDED RELEASE 24 HR ORAL 2 TIMES DAILY WITH MEALS
Qty: 360 TABLET | Refills: 3 | Status: SHIPPED | OUTPATIENT
Start: 2022-12-20 | End: 2023-04-25

## 2022-12-20 RX ORDER — METFORMIN HCL 500 MG
1000 TABLET, EXTENDED RELEASE 24 HR ORAL
Qty: 180 TABLET | Refills: 3 | Status: SHIPPED | OUTPATIENT
Start: 2022-12-20 | End: 2022-12-20

## 2022-12-20 RX ORDER — HYDROCHLOROTHIAZIDE 12.5 MG/1
12.5 TABLET ORAL DAILY
Qty: 90 TABLET | Refills: 3 | Status: SHIPPED | OUTPATIENT
Start: 2022-12-20 | End: 2023-11-21

## 2022-12-20 RX ORDER — ATORVASTATIN CALCIUM 10 MG/1
10 TABLET, FILM COATED ORAL DAILY
Qty: 90 TABLET | Refills: 3 | Status: SHIPPED | OUTPATIENT
Start: 2022-12-20 | End: 2023-11-21

## 2022-12-20 ASSESSMENT — PAIN SCALES - GENERAL: PAINLEVEL: NO PAIN (0)

## 2022-12-20 NOTE — PROGRESS NOTES
"  Assessment & Plan     Type 2 diabetes mellitus without complication, without long-term current use of insulin (H)  A1C up-- not watching as well. Discussed. Will double his Glucophage and needs to work on diet better. Continue current medications and behavioral changes.   Symptomatic treatment was discussed along when patient should call and/or come back into the clinic or go to ER/Urgent care. All questions answered.   Follow-up in 4 months   - losartan (COZAAR) 50 MG tablet; Take 1 tablet (50 mg) by mouth daily  - atorvastatin (LIPITOR) 10 MG tablet; Take 1 tablet (10 mg) by mouth daily  - metFORMIN (GLUCOPHAGE XR) 500 MG 24 hr tablet; Take 2 tablets (1,000 mg) by mouth 2 times daily (with meals)    Essential hypertension with goal blood pressure less than 140/90  Stable - Continue current medications and behavioral changes.   - losartan (COZAAR) 50 MG tablet; Take 1 tablet (50 mg) by mouth daily  - hydrochlorothiazide (HYDRODIURIL) 12.5 MG tablet; Take 1 tablet (12.5 mg) by mouth daily    Pure hypercholesterolemia  Stable on statin   - atorvastatin (LIPITOR) 10 MG tablet; Take 1 tablet (10 mg) by mouth daily    Morbid obesity due to excess calories (H)  Stable - needs lower wt - discussed.     Vaccine counseling  Discussed. Pt becoming more of \"antivaxer\"  --- declines all that are due.  pt aware of risk     Screening for prostate cancer  psa ok    History of total left hip arthroplasty  Stable -- right hip going now. RF given labs ok   - diclofenac (VOLTAREN) 75 MG EC tablet; Take 1 tablet (75 mg) by mouth 2 times daily    Pain of left lower leg  Increase sciatica. See below  - diclofenac (VOLTAREN) 75 MG EC tablet; Take 1 tablet (75 mg) by mouth 2 times daily    Primary osteoarthritis of right hip  Use the above Nsaids. Not ready yet for new hip  - diclofenac (VOLTAREN) 75 MG EC tablet; Take 1 tablet (75 mg) by mouth 2 times daily    FH: colon cancer  Has FH-- pt to get repeat colon done.  - Colonoscopy " Screening  Referral; Future    DDD (degenerative disc disease), lumbar  Discussed. BENNIE- worked in past. Wants to repeat. Worked great.   - XR Lumbar Translaminar Inj Incl Imaging; Future           BMI:   Estimated body mass index is 46.38 kg/m  as calculated from the following:    Height as of this encounter: 1.829 m (6').    Weight as of this encounter: 155.1 kg (342 lb).   Weight management plan: Discussed healthy diet and exercise guidelines        No follow-ups on file.    Zak Schrader MD  [facility] - JEFF Carroll is a 57 year old, presenting for the following health issues:  No chief complaint on file.      HPI     Diabetes Follow-up      How often are you checking your blood sugar? Not at all    What concerns do you have today about your diabetes? None     Do you have any of these symptoms? (Select all that apply)  No numbness or tingling in feet.  No redness, sores or blisters on feet.  No complaints of excessive thirst.  No reports of blurry vision.  No significant changes to weight.      BP Readings from Last 2 Encounters:   05/27/22 138/88   02/14/22 132/87     Hemoglobin A1C (%)   Date Value   05/27/2022 6.8 (H)   02/14/2022 6.9 (H)   02/10/2021 6.4 (H)   08/10/2020 6.4 (H)     LDL Cholesterol Calculated (mg/dL)   Date Value   02/14/2022 104 (H)   02/10/2021 94   01/15/2020 115 (H)         Hypertension Follow-up      Do you check your blood pressure regularly outside of the clinic? No     Are you following a low salt diet? No    Are your blood pressures ever more than 140 on the top number (systolic) OR more   than 90 on the bottom number (diastolic), for example 140/90? Yes    Hyperlipidemia Follow-Up      Are you regularly taking any medication or supplement to lower your cholesterol?   Yes- atorvastatin    Are you having muscle aches or other side effects that you think could be caused by your cholesterol lowering medication?  No          Review of Systems    Constitutional, HEENT, cardiovascular, pulmonary, GI, , musculoskeletal, neuro, skin, endocrine and psych systems are negative, except as otherwise noted.      Objective    /86   Pulse 80   Temp 97.5  F (36.4  C) (Tympanic)   Resp 14   Ht 1.829 m (6')   Wt (!) 155.1 kg (342 lb)   SpO2 98%   BMI 46.38 kg/m    Body mass index is 46.38 kg/m .  Physical Exam   GENERAL: healthy, alert and no distress  EYES: Eyes grossly normal to inspection, PERRL and conjunctivae and sclerae normal  HENT: ear canals and TM's normal, nose and mouth without ulcers or lesions  NECK: no adenopathy, no asymmetry, masses, or scars and thyroid normal to palpation  RESP: lungs clear to auscultation - no rales, rhonchi or wheezes  CV: regular rate and rhythm, normal S1 S2, no S3 or S4, no murmur, click or rub, no peripheral edema and peripheral pulses strong  ABDOMEN: soft, nontender, no hepatosplenomegaly, no masses and bowel sounds normal  MS: no gross musculoskeletal defects noted, no edema  SKIN: no suspicious lesions or rashes  NEURO: Normal strength and tone, mentation intact and speech normal  PSYCH: mentation appears normal, affect normal/bright  Diabetic foot exam: normal DP and PT pulses, no trophic changes or ulcerative lesions, normal sensory exam and normal monofilament exam        Results for orders placed or performed in visit on 12/20/22   Hemoglobin A1c     Status: Abnormal   Result Value Ref Range    Estimated Average Glucose 186 mg/dL    Hemoglobin A1C 8.1 (H) <5.7 %   Lipid Profile     Status: Abnormal   Result Value Ref Range    Cholesterol 177 <200 mg/dL    Triglycerides 103 <150 mg/dL    Direct Measure HDL 42 >=40 mg/dL    LDL Cholesterol Calculated 114 (H) <=100 mg/dL    Non HDL Cholesterol 135 (H) <130 mg/dL    Narrative    Cholesterol  Desirable:  <200 mg/dL    Triglycerides  Normal:  Less than 150 mg/dL  Borderline High:  150-199 mg/dL  High:  200-499 mg/dL  Very High:  Greater than or equal to 500  mg/dL    Direct Measure HDL  Female:  Greater than or equal to 50 mg/dL   Male:  Greater than or equal to 40 mg/dL    LDL Cholesterol  Desirable:  <100mg/dL  Above Desirable:  100-129 mg/dL   Borderline High:  130-159 mg/dL   High:  160-189 mg/dL   Very High:  >= 190 mg/dL    Non HDL Cholesterol  Desirable:  130 mg/dL  Above Desirable:  130-159 mg/dL  Borderline High:  160-189 mg/dL  High:  190-219 mg/dL  Very High:  Greater than or equal to 220 mg/dL   Comprehensive metabolic panel     Status: Abnormal   Result Value Ref Range    Sodium 140 136 - 145 mmol/L    Potassium 4.3 3.4 - 5.3 mmol/L    Chloride 104 98 - 107 mmol/L    Carbon Dioxide (CO2) 23 22 - 29 mmol/L    Anion Gap 13 7 - 15 mmol/L    Urea Nitrogen 15.7 6.0 - 20.0 mg/dL    Creatinine 0.79 0.67 - 1.17 mg/dL    Calcium 9.4 8.6 - 10.0 mg/dL    Glucose 221 (H) 70 - 99 mg/dL    Alkaline Phosphatase 81 40 - 129 U/L    AST 22 10 - 50 U/L    ALT 44 10 - 50 U/L    Protein Total 7.3 6.4 - 8.3 g/dL    Albumin 4.4 3.5 - 5.2 g/dL    Bilirubin Total 0.2 <=1.2 mg/dL    GFR Estimate >90 >60 mL/min/1.73m2   CBC with platelets and differential     Status: None   Result Value Ref Range    WBC Count 7.5 4.0 - 11.0 10e3/uL    RBC Count 4.64 4.40 - 5.90 10e6/uL    Hemoglobin 14.0 13.3 - 17.7 g/dL    Hematocrit 40.9 40.0 - 53.0 %    MCV 88 78 - 100 fL    MCH 30.2 26.5 - 33.0 pg    MCHC 34.2 31.5 - 36.5 g/dL    RDW 13.6 10.0 - 15.0 %    Platelet Count 240 150 - 450 10e3/uL    % Neutrophils 60 %    % Lymphocytes 29 %    % Monocytes 9 %    % Eosinophils 2 %    % Basophils 0 %    % Immature Granulocytes 0 %    NRBCs per 100 WBC 0 <1 /100    Absolute Neutrophils 4.5 1.6 - 8.3 10e3/uL    Absolute Lymphocytes 2.2 0.8 - 5.3 10e3/uL    Absolute Monocytes 0.7 0.0 - 1.3 10e3/uL    Absolute Eosinophils 0.2 0.0 - 0.7 10e3/uL    Absolute Basophils 0.0 0.0 - 0.2 10e3/uL    Absolute Immature Granulocytes 0.0 <=0.4 10e3/uL    Absolute NRBCs 0.0 10e3/uL   CBC with Platelets & Differential      Status: None    Narrative    The following orders were created for panel order CBC with Platelets & Differential.  Procedure                               Abnormality         Status                     ---------                               -----------         ------                     CBC with platelets and d...[[id number]]                      Final result                 Please view results for these tests on the individual orders.

## 2023-01-15 ENCOUNTER — HEALTH MAINTENANCE LETTER (OUTPATIENT)
Age: 58
End: 2023-01-15

## 2023-01-18 ENCOUNTER — TELEPHONE (OUTPATIENT)
Dept: FAMILY MEDICINE | Facility: OTHER | Age: 58
End: 2023-01-18

## 2023-01-18 DIAGNOSIS — Z12.11 ENCOUNTER FOR SCREENING COLONOSCOPY: Primary | ICD-10-CM

## 2023-01-20 RX ORDER — BISACODYL 5 MG
10 TABLET, DELAYED RELEASE (ENTERIC COATED) ORAL ONCE
Qty: 2 TABLET | Refills: 0 | Status: SHIPPED | OUTPATIENT
Start: 2023-01-20 | End: 2023-01-20

## 2023-01-20 NOTE — TELEPHONE ENCOUNTER
Screening Questions for the Scheduling of Screening Colonoscopies     (If Colonoscopy is diagnostic, Provider should review the chart before scheduling.)    Are you younger than 50 or older than 80?  Non    Do you take aspirin or fish oil?  No (if yes, tell patient to stop 1 week prior to Colonoscopy)n    Do you take warfarin (Coumadin), clopidogrel (Plavix), apixaban (Eliquis), dabigatram (Pradaxa), rivaroxaban (Xarelto) or any blood thinner? No    Do you use oxygen at home?  No    Do you have kidney disease? No    Are you on dialysis? No    Have you had a stroke or heart attack in the last year? No    Have you had a stent in your heart or any blood vessel in the last year? No    Have you had a transplant of any organ?  No    Have you had a colonoscopy or upper endoscopy (EGD) before?  YES. Date-2015.         Date of scheduled Colonoscopy: 2/24/23    Provider: Dr Fred Ryan

## 2023-01-20 NOTE — TELEPHONE ENCOUNTER
Screening Colonoscopy - Meet & Greet scheduled on 2/24/2023 with Dr. Ibarra. Does NOT need preop.   Booklet sent by mail today.

## 2023-02-16 ENCOUNTER — ANESTHESIA EVENT (OUTPATIENT)
Dept: SURGERY | Facility: HOSPITAL | Age: 58
End: 2023-02-16
Payer: COMMERCIAL

## 2023-02-16 NOTE — ANESTHESIA PREPROCEDURE EVALUATION
Anesthesia Pre-Procedure Evaluation    Patient: Glen Rogers   MRN: [id number] : 1965        Procedure : Procedure(s):  COLONOSCOPY          Past Medical History:   Diagnosis Date     Diabetes mellitus, type 2 (H) 2015     Essential hypertension 2015     FH: colon cancer     father in his 70's     HTN (hypertension)     borderline     Obesity      GIULIANA on CPAP      PONV (postoperative nausea and vomiting)      Type 2 diabetes mellitus without complication (H) 2015      Past Surgical History:   Procedure Laterality Date     ARTHROPLASTY HIP ANTERIOR Left 3/3/2020    Procedure: DIRECT ANTERIOR LEFT TOTAL HIP ARTHROPLASTY;  Surgeon: Can Bryant MD;  Location: [facility]     COLONOSCOPY N/A 2015    Repeat in ??/Procedure: COLONOSCOPY;  Surgeon: Ousmane Eddy DO;  Location: [facility] OR     ENT SURGERY      tonsillectomy     IR CONSULTATION FOR IR EXAM  2020      Allergies   Allergen Reactions     Morphine Hives     Zocor [Simvastatin] Muscle Pain (Myalgia)      Social History     Tobacco Use     Smoking status: Never     Smokeless tobacco: Never     Tobacco comments:     Does have occasional cigar    Substance Use Topics     Alcohol use: Yes     Alcohol/week: 0.0 standard drinks     Comment: social      Wt Readings from Last 1 Encounters:   22 (!) 155.1 kg (342 lb)        Anesthesia Evaluation   Pt has had prior anesthetic.     History of anesthetic complications  - PONV.      ROS/MED HX  ENT/Pulmonary:     (+) sleep apnea, uses CPAP, GIULIANA risk factors, hypertension, obese,     Neurologic:  - neg neurologic ROS     Cardiovascular:     (+) Dyslipidemia hypertension-----    METS/Exercise Tolerance: >4 METS    Hematologic:  - neg hematologic  ROS     Musculoskeletal: Comment: Chronic left sided low back pain with left sided sciatica  (+) arthritis (not much pain today per patient),     GI/Hepatic:     (+) bowel prep,     Renal/Genitourinary:     (+) Nephrolithiasis ,     Endo:      (+) type II DM, Last HgA1c: 8.2, Diabetic complications: neuropathy. Obesity,     Psychiatric/Substance Use:  - neg psychiatric ROS     Infectious Disease:  - neg infectious disease ROS     Malignancy:  - neg malignancy ROS     Other:  - neg other ROS          Physical Exam    Airway        Mallampati: III   TM distance: > 3 FB   Neck ROM: full   Mouth opening: > 3 cm    Respiratory Devices and Support         Dental       (+) Modest Abnormalities - crowns, retainers, 1 or 2 missing teeth      Cardiovascular          Rhythm and rate: regular and normal     Pulmonary   pulmonary exam normal        breath sounds clear to auscultation           OUTSIDE LABS:  CBC:   Lab Results   Component Value Date    WBC 7.5 12/20/2022    WBC 7.8 02/14/2022    HGB 14.0 12/20/2022    HGB 14.1 02/14/2022    HCT 40.9 12/20/2022    HCT 40.8 02/14/2022     12/20/2022     02/14/2022     BMP:   Lab Results   Component Value Date     12/20/2022     02/14/2022    POTASSIUM 4.3 12/20/2022    POTASSIUM 4.0 02/14/2022    CHLORIDE 104 12/20/2022    CHLORIDE 105 02/14/2022    CO2 23 12/20/2022    CO2 27 02/14/2022    BUN 15.7 12/20/2022    BUN 17 02/14/2022    CR 0.79 12/20/2022    CR 0.77 02/14/2022     (H) 12/20/2022     (H) 02/14/2022     COAGS: No results found for: PTT, INR, FIBR  POC:   Lab Results   Component Value Date     (H) 03/04/2020     HEPATIC:   Lab Results   Component Value Date    ALBUMIN 4.4 12/20/2022    PROTTOTAL 7.3 12/20/2022    ALT 44 12/20/2022    AST 22 12/20/2022    ALKPHOS 81 12/20/2022    BILITOTAL 0.2 12/20/2022     OTHER:   Lab Results   Component Value Date    A1C 8.1 (H) 12/20/2022    JARROD 9.4 12/20/2022    TSH 0.99 12/20/2022    SED 9 02/21/2020       Anesthesia Plan    ASA Status:  3   NPO Status:  NPO Appropriate    Anesthesia Type: MAC.     - Reason for MAC: straight local not clinically adequate   Induction: Intravenous, Propofol.   Maintenance: Balanced.         Consents    Anesthesia Plan(s) and associated risks, benefits, and realistic alternatives discussed. Questions answered and patient/representative(s) expressed understanding.    - Discussed:     - Discussed with:  Patient         Postoperative Care            Comments:    Other Comments: Need HP Diogo today  Risks and benefits of MAC anesthetic discussed including dental damage, aspiration, loss of airway, conversion to general anesthetic, CV complications, MI, stroke, death. Pt wishes to proceed.             CHRISTOPHER Gracia CRNA

## 2023-02-24 ENCOUNTER — HOSPITAL ENCOUNTER (OUTPATIENT)
Facility: HOSPITAL | Age: 58
Discharge: HOME | End: 2023-02-24
Attending: SURGERY | Admitting: SURGERY
Payer: COMMERCIAL

## 2023-02-24 ENCOUNTER — ANESTHESIA (OUTPATIENT)
Dept: SURGERY | Facility: HOSPITAL | Age: 58
End: 2023-02-24
Payer: COMMERCIAL

## 2023-02-24 VITALS
HEIGHT: 72 IN | SYSTOLIC BLOOD PRESSURE: 114 MMHG | BODY MASS INDEX: 42.66 KG/M2 | HEART RATE: 85 BPM | TEMPERATURE: 97.1 F | DIASTOLIC BLOOD PRESSURE: 57 MMHG | OXYGEN SATURATION: 96 % | RESPIRATION RATE: 16 BRPM | WEIGHT: 315 LBS

## 2023-02-24 LAB — GLUCOSE BLDC GLUCOMTR-MCNC: 128 MG/DL (ref 70–99)

## 2023-02-24 PROCEDURE — 88305 TISSUE EXAM BY PATHOLOGIST: CPT | Mod: 26 | Performed by: PATHOLOGY

## 2023-02-24 PROCEDURE — 82962 GLUCOSE BLOOD TEST: CPT

## 2023-02-24 PROCEDURE — 370N000017 HC ANESTHESIA TECHNICAL FEE, PER MIN: Performed by: SURGERY

## 2023-02-24 PROCEDURE — 360N000075 HC SURGERY LEVEL 2, PER MIN: Performed by: SURGERY

## 2023-02-24 PROCEDURE — 710N000012 HC RECOVERY PHASE 2, PER MINUTE: Performed by: SURGERY

## 2023-02-24 PROCEDURE — 45380 COLONOSCOPY AND BIOPSY: CPT | Mod: PT | Performed by: SURGERY

## 2023-02-24 PROCEDURE — 88305 TISSUE EXAM BY PATHOLOGIST: CPT | Mod: TC | Performed by: SURGERY

## 2023-02-24 PROCEDURE — 45380 COLONOSCOPY AND BIOPSY: CPT | Performed by: NURSE ANESTHETIST, CERTIFIED REGISTERED

## 2023-02-24 PROCEDURE — 272N000001 HC OR GENERAL SUPPLY STERILE: Performed by: SURGERY

## 2023-02-24 PROCEDURE — 999N000141 HC STATISTIC PRE-PROCEDURE NURSING ASSESSMENT: Performed by: SURGERY

## 2023-02-24 PROCEDURE — 250N000011 HC RX IP 250 OP 636: Performed by: NURSE ANESTHETIST, CERTIFIED REGISTERED

## 2023-02-24 PROCEDURE — 250N000009 HC RX 250: Performed by: NURSE ANESTHETIST, CERTIFIED REGISTERED

## 2023-02-24 PROCEDURE — 258N000003 HC RX IP 258 OP 636: Performed by: NURSE ANESTHETIST, CERTIFIED REGISTERED

## 2023-02-24 RX ORDER — POLYETHYLENE GLYCOL-3350 AND ELECTROLYTES 236; 6.74; 5.86; 2.97; 22.74 G/274.31G; G/274.31G; G/274.31G; G/274.31G; G/274.31G
POWDER, FOR SOLUTION ORAL
COMMUNITY
Start: 2023-01-20 | End: 2023-04-25

## 2023-02-24 RX ORDER — NALOXONE HYDROCHLORIDE 0.4 MG/ML
0.2 INJECTION, SOLUTION INTRAMUSCULAR; INTRAVENOUS; SUBCUTANEOUS
Status: CANCELLED | OUTPATIENT
Start: 2023-02-24

## 2023-02-24 RX ORDER — PROPOFOL 10 MG/ML
INJECTION, EMULSION INTRAVENOUS PRN
Status: DISCONTINUED | OUTPATIENT
Start: 2023-02-24 | End: 2023-02-24

## 2023-02-24 RX ORDER — LIDOCAINE HYDROCHLORIDE 20 MG/ML
INJECTION, SOLUTION INFILTRATION; PERINEURAL PRN
Status: DISCONTINUED | OUTPATIENT
Start: 2023-02-24 | End: 2023-02-24

## 2023-02-24 RX ORDER — FLUMAZENIL 0.1 MG/ML
0.2 INJECTION, SOLUTION INTRAVENOUS
Status: CANCELLED | OUTPATIENT
Start: 2023-02-24 | End: 2023-02-25

## 2023-02-24 RX ORDER — NALOXONE HYDROCHLORIDE 0.4 MG/ML
0.4 INJECTION, SOLUTION INTRAMUSCULAR; INTRAVENOUS; SUBCUTANEOUS
Status: CANCELLED | OUTPATIENT
Start: 2023-02-24

## 2023-02-24 RX ORDER — BISACODYL 5 MG
TABLET, DELAYED RELEASE (ENTERIC COATED) ORAL
COMMUNITY
Start: 2023-01-20 | End: 2023-04-25

## 2023-02-24 RX ORDER — SODIUM CHLORIDE, SODIUM LACTATE, POTASSIUM CHLORIDE, CALCIUM CHLORIDE 600; 310; 30; 20 MG/100ML; MG/100ML; MG/100ML; MG/100ML
INJECTION, SOLUTION INTRAVENOUS CONTINUOUS
Status: DISCONTINUED | OUTPATIENT
Start: 2023-02-24 | End: 2023-02-24 | Stop reason: HOSPADM

## 2023-02-24 RX ORDER — LIDOCAINE 40 MG/G
CREAM TOPICAL
Status: DISCONTINUED | OUTPATIENT
Start: 2023-02-24 | End: 2023-02-24 | Stop reason: HOSPADM

## 2023-02-24 RX ADMIN — PROPOFOL 50 MG: 10 INJECTION, EMULSION INTRAVENOUS at 12:40

## 2023-02-24 RX ADMIN — PROPOFOL 50 MG: 10 INJECTION, EMULSION INTRAVENOUS at 12:37

## 2023-02-24 RX ADMIN — PROPOFOL 50 MG: 10 INJECTION, EMULSION INTRAVENOUS at 12:44

## 2023-02-24 RX ADMIN — PROPOFOL 80 MG: 10 INJECTION, EMULSION INTRAVENOUS at 12:27

## 2023-02-24 RX ADMIN — PROPOFOL 50 MG: 10 INJECTION, EMULSION INTRAVENOUS at 12:47

## 2023-02-24 RX ADMIN — PROPOFOL 50 MG: 10 INJECTION, EMULSION INTRAVENOUS at 12:32

## 2023-02-24 RX ADMIN — SODIUM CHLORIDE, POTASSIUM CHLORIDE, SODIUM LACTATE AND CALCIUM CHLORIDE: 600; 310; 30; 20 INJECTION, SOLUTION INTRAVENOUS at 12:49

## 2023-02-24 RX ADMIN — PROPOFOL 20 MG: 10 INJECTION, EMULSION INTRAVENOUS at 12:35

## 2023-02-24 RX ADMIN — SODIUM CHLORIDE, POTASSIUM CHLORIDE, SODIUM LACTATE AND CALCIUM CHLORIDE: 600; 310; 30; 20 INJECTION, SOLUTION INTRAVENOUS at 12:10

## 2023-02-24 RX ADMIN — PROPOFOL 50 MG: 10 INJECTION, EMULSION INTRAVENOUS at 12:29

## 2023-02-24 RX ADMIN — LIDOCAINE HYDROCHLORIDE 40 MG: 20 INJECTION, SOLUTION INFILTRATION; PERINEURAL at 12:27

## 2023-02-24 RX ADMIN — PROPOFOL 20 MG: 10 INJECTION, EMULSION INTRAVENOUS at 12:50

## 2023-02-24 ASSESSMENT — ACTIVITIES OF DAILY LIVING (ADL)
ADLS_ACUITY_SCORE: 35
ADLS_ACUITY_SCORE: 35

## 2023-02-24 NOTE — DISCHARGE INSTRUCTIONS
You had biopsies taken from your colon today.  Dr. Ibarra's office will call you with the pathology results when they become available.

## 2023-02-24 NOTE — ANESTHESIA CARE TRANSFER NOTE
Patient: Glen Rogers    Procedure: Procedure(s):  COLONOSCOPY with biopsy       Diagnosis: Encounter for screening colonoscopy [Z12.11]  Diagnosis Additional Information: No value filed.    Anesthesia Type:   MAC     Note:    Oropharynx: spontaneously breathing  Level of Consciousness: awake  Oxygen Supplementation: room air    Independent Airway: airway patency satisfactory and stable  Dentition: dentition unchanged  Vital Signs Stable: post-procedure vital signs reviewed and stable  Report to RN Given: handoff report given  Patient transferred to: Phase II    Handoff Report: Identifed the Patient, Identified the Reponsible Provider, Reviewed the pertinent medical history, Discussed the surgical course, Reviewed Intra-OP anesthesia mangement and issues during anesthesia, Set expectations for post-procedure period and Allowed opportunity for questions and acknowledgement of understanding      Vitals:  Vitals Value Taken Time   BP     Temp     Pulse     Resp     SpO2         Electronically Signed By: CHRISTOPHER Osorio CRNA  February 24, 2023  12:54 PM

## 2023-02-24 NOTE — H&P
Surgery Consult Clinic Note      RE: Glen Rogers  : 1965        Chief Complaint:  Colon cancer screening  1st degree relative with colon cancer    History of Present Illness:  I am seeing Glen Rogers at the request of Dr. Schrader for evaluation regarding meet and greet screening colonoscopy.  He had a normal colonoscopy in .  His father was diagnosed with colon cancer in his 70's.  He denies blood in stool, changes in bowel habits, weight loss, abdominal pain.  No previous abdominal surgeries.   He has no questions regarding  bowel prep.  Reports passing clear yellow liquid stools today.     He specifically denies fevers, chills, nausea, vomiting, chest pain, shortness of breath, palpitations, sore throat, cough, or generalized feeling ill.       Medical history:  Past Medical History:   Diagnosis Date     Diabetes mellitus, type 2 (H) 2015     Essential hypertension 2015     FH: colon cancer     father in his 70's     HTN (hypertension)     borderline     Obesity      GIULIANA on CPAP      PONV (postoperative nausea and vomiting)      Type 2 diabetes mellitus without complication (H) 2015       Surgical history:  Past Surgical History:   Procedure Laterality Date     ARTHROPLASTY HIP ANTERIOR Left 3/3/2020    Procedure: DIRECT ANTERIOR LEFT TOTAL HIP ARTHROPLASTY;  Surgeon: Can Bryant MD;  Location: [facility]     COLONOSCOPY N/A 2015    Repeat in ??/Procedure: COLONOSCOPY;  Surgeon: Ousmane Eddy DO;  Location: [facility]     ENT SURGERY      tonsillectomy     IR CONSULTATION FOR IR EXAM  2020       Family history:  Family History   Problem Relation Age of Onset     Hypertension Mother      Hypertension Father      Cancer - colorectal Father         around age 70     Chemical Addiction Father      Other Cancer Maternal Grandfather      Coronary Artery Disease Paternal Grandmother      Hypertension Brother        Medications:  Prior to Admission medications   "  Medication Sig Start Date End Date Taking? Authorizing Provider   atorvastatin (LIPITOR) 10 MG tablet Take 1 tablet (10 mg) by mouth daily 12/20/22  Yes Zak Schrader MD   bisacodyl (DULCOLAX) 5 MG EC tablet Take 2 tablets (10 mg) by mouth once for 1 dose Refer to \"Getting Ready for a Colonoscopy\" instruction handout 1/20/23  Yes Reported, Patient   diclofenac (VOLTAREN) 75 MG EC tablet Take 1 tablet (75 mg) by mouth 2 times daily 12/20/22  Yes Zak Schrader MD   GAVILYTE-G 236 g suspension Take 4,000 mLs by mouth once for 1 dose Refer to \"Getting Ready for a Colonoscopy\" instruction handout 1/20/23  Yes Reported, Patient   hydrochlorothiazide (HYDRODIURIL) 12.5 MG tablet Take 1 tablet (12.5 mg) by mouth daily 12/20/22  Yes Zak Schrader MD   losartan (COZAAR) 50 MG tablet Take 1 tablet (50 mg) by mouth daily 12/20/22  Yes Zak Schrader MD   metFORMIN (GLUCOPHAGE XR) 500 MG 24 hr tablet Take 2 tablets (1,000 mg) by mouth 2 times daily (with meals) 12/20/22  Yes Zak Schrader MD       Allergies:  The patient is allergic to morphine and zocor [simvastatin].  .  Social history:  Social History     Tobacco Use     Smoking status: Never     Smokeless tobacco: Never     Tobacco comments:     Does have occasional cigar    Substance Use Topics     Alcohol use: Yes     Alcohol/week: 0.0 standard drinks     Comment: social     Marital status: .    Review of Systems:    Constitutional: Negative for fever, chills.   HENT: Negative for ear pain, congestion, sore throat, and ear discharge.    Eyes: Negative for blurred vision, double vision.   Respiratory: Negative for cough, hemoptysis, shortness of breath, wheezing and stridor.    Cardiovascular: Negative for chest pain, palpitations and orthopnea.   Gastrointestinal: Negative for heartburn, nausea, vomiting, abdominal pain and blood in stool.   Genitourinary: Negative for urgency, frequency   Musculoskeletal: Negative for myalgias, back pain and joint " pain.   Neurological: Negative for tingling, speech change and headaches.   Endo/Heme/Allergies: Does not bruise/bleed easily.   Psychiatric/Behavioral: Negative for depression, suicidal ideas and hallucinations. The patient is not nervous/anxious.    Physical Examination:  /68   Pulse 72   Temp 97.3  F (36.3  C) (Tympanic)   Resp 18   Ht 1.829 m (6')   Wt (!) 158.3 kg (349 lb)   SpO2 96%   BMI 47.33 kg/m    General: Alert and orientedx4, no acute distress, well-developed/well-nourished, ambulating without assistance  HEENT: normocephalic atraumatic, extraocular movements intact, sclerae anicteric, Trachea mideline  Chest:   Clear to auscultation bilaterally.  Cardiac: S1S2 , regular rate and rhythm without additional sounds  Abdomen: Soft, non-tender, non-distended  Extremities: Cursory exam unremarkable.  No peripheral edema noted.  Skin: Warm, dry, less than 2 sec cap refill  Neuro: CN 2-12 grossly intact, no focal deficit, GCS 15  Psych: Pleasant, calm, asks appropriate questions      Assessment/Plan:  #1 Colonoscopy  #2 1st degree relative with colon cancer  #3 Obesity  #4 DM type   #5 Sleep apnea    Glen Rogers and I had a discussion about colonoscopies.  The indications, risks, benefits, althernatives and technical aspects of whole colon colonoscopy were outlined with risks including, but not limited to, perforation, bleeding and inability to visualize entire colon.  Management of each was reviewed including the risk for life saving surgery and possible admittance to the hospital.  The need of mechanical preparation of the colon was reviewed along with the use of monitored anesthetic care.  His questions were asked and answered.  We will proceed with colonoscopy with Dr. Ibarra as scheduled.  [facility]  [address] [place] [address]    [id number]    Referring Provider:  No referring provider defined for this encounter.     Primary  Care Provider:  Zak Schrader

## 2023-02-24 NOTE — OP NOTE
Glen Rogers MRN# [id number]   YOB: 1965 Age: 57 year old      Date of Admission:  2/24/2023  Date of Service:   2/24/23    Primary care provider: Zak Schrader    PREOPERATIVE DIAGNOSIS:  Colon Cancer Screening        POSTOPERATIVE DIAGNOSIS:  Colon biopsies          PROCEDURE:  Colonoscopy with biopsies         INDICATIONS:  Screening colonoscopy.      Specimen:   ID Type Source Tests Collected by Time Destination   1 : cecal biopsies Biopsy Large Intestine, Colon, Cecum SURGICAL PATHOLOGY EXAM Junito Ibarra MD 2/24/2023 12:48 PM        SURGEON: Junito Ibarra MD    DESCRIPTION OF PROCEDURE: Glen Rogers was brought into the endoscopy suite and placed in the left lateral decubitus position. After preprocedural pause and attended monitored anesthesia was administered, the external anus was inspected and was normal. Digital rectal exam was normal. The colonoscope was inserted and advanced under direct visualization to the level of the cecum which was identified by the ileocecal valve. Was able to peer around the valve into cecum, scope was quite difficult due to body habitus. Near valve did take some biopsies.  The prep was fair.. Upon slow withdrawal of the colonoscope, approximately 95% of the mucosa was directly visualized. The rest of the colon was without mucosal abnormality. There was no evidence of further polyps, inflammation, bleeding or AVMs. Retroflexion of the rectum was normal. The extra air was removed from the colon, and the colonoscope withdrawn. The patient tolerated the procedure well and was taken to postanesthesia care unit.     We invite the patient to return in 5 years for follow up screening evaluation, pending pathology related to biopsies.    Junito Ibarra MD

## 2023-02-24 NOTE — ANESTHESIA POSTPROCEDURE EVALUATION
Patient: Glen Rogers    Procedure: Procedure(s):  COLONOSCOPY with biopsy       Anesthesia Type:  MAC    Note:  Disposition: Outpatient   Postop Pain Control: Uneventful            Sign Out: Well controlled pain   PONV: No   Neuro/Psych: Uneventful            Sign Out: Acceptable/Baseline neuro status   Airway/Respiratory: Uneventful            Sign Out: Acceptable/Baseline resp. status   CV/Hemodynamics: Uneventful            Sign Out: Acceptable CV status; No obvious hypovolemia; No obvious fluid overload   Other NRE: NONE   DID A NON-ROUTINE EVENT OCCUR? No           Last vitals:  Vitals Value Taken Time   /57 02/24/23 1300   Temp     Pulse 85 02/24/23 1300   Resp 16 02/24/23 1300   SpO2 96 % 02/24/23 1309   Vitals shown include unvalidated device data.    Electronically Signed By: CHRISTOPHER Osorio CRNA  February 24, 2023  1:10 PM

## 2023-02-28 LAB
PATH REPORT.COMMENTS IMP SPEC: NORMAL
PATH REPORT.FINAL DX SPEC: NORMAL
PATH REPORT.GROSS SPEC: NORMAL
PATH REPORT.MICROSCOPIC SPEC OTHER STN: NORMAL
PATH REPORT.RELEVANT HX SPEC: NORMAL
PHOTO IMAGE: NORMAL

## 2023-04-24 NOTE — PROGRESS NOTES
Assessment & Plan     Type 2 diabetes mellitus without complication, without long-term current use of insulin (H)  A1C better than 8 before but can not tolerate glucophage and had to go from 4 to 3 and at times 2 tablets due to fear of stool incontinence   - Was on 2 tablets of Glucophage and wants to go back to that. Will do.  Discussed R/B of Ozemplic would also help the sequela of his obesity - djd/back issues/ lumbar ddd etc.   Pt would try it. Will order and then get set up with DRC  - Basic metabolic panel; Future  - Hemoglobin A1c; Future  - Adult Eye  Referral; Future  - metFORMIN (GLUCOPHAGE XR) 500 MG 24 hr tablet; Take 1 tablet (500 mg) by mouth 2 times daily (with meals)  - semaglutide (OZEMPIC) 2 MG/3ML pen; 0.25mg subcutaneous every 7 days for a month then go up to 0.5mg subcutaneous every 7 days    Essential hypertension with goal blood pressure less than 140/90  Stable   - Basic metabolic panel; Future    Morbid obesity due to excess calories (H)  As above. If lost wt - it would help DM./HTN and all the above issues     Medication side effects  Back down to 2 glucophage instead of 4 .       Diagnosis or treatment significantly limited by social determinants of health - complex - discussed med changes and r/B o fmeds along with risk of obesity with him   Ordering of each unique test  Prescription drug management  41 minutes spent by me on the date of the encounter doing chart review, history and exam, documentation and further activities per the note           No follow-ups on file.    Zak Schrader MD  [facility] - JEFF Carroll is a 57 year old, presenting for the following health issues:  Diabetes         View : No data to display.              HPI     Diabetes Follow-up      How often are you checking your blood sugar? Not at all    What concerns do you have today about your diabetes? Other: long term effects      Do you have any of these symptoms?  (Select all that apply)  No numbness or tingling in feet.  No redness, sores or blisters on feet.  No complaints of excessive thirst.  No reports of blurry vision.  No significant changes to weight.    Have you had a diabetic eye exam in the last 12 months? Unknown     Much more GI side effects with 3-4 Glucophage. Mostly takes avg 3-- with that gets GI sx mostly diarrhea   Can not handle 4 tabs .      BP Readings from Last 2 Encounters:   04/25/23 131/87   02/24/23 114/57     Hemoglobin A1C (%)   Date Value   12/20/2022 8.1 (H)   05/27/2022 6.8 (H)   02/10/2021 6.4 (H)   08/10/2020 6.4 (H)     LDL Cholesterol Calculated (mg/dL)   Date Value   12/20/2022 114 (H)   02/14/2022 104 (H)   02/10/2021 94   01/15/2020 115 (H)           Hypertension Follow-up      Do you check your blood pressure regularly outside of the clinic? No     Are you following a low salt diet? No    Are your blood pressures ever more than 140 on the top number (systolic) OR more   than 90 on the bottom number (diastolic), for example 140/90? No    Chronic/Recurring Back Pain Follow Up      Where is your back pain located? (Select all that apply) low back left    How would you describe your back pain?  burning and dull ache    Where does your back pain spread? the left  knee    Since your last clinic visit for back pain, how has your pain changed? unchanged    Does your back pain interfere with your job? YES    Since your last visit, have you tried any new treatment? No    Has appt for another shot in back           Review of Systems   Constitutional, HEENT, cardiovascular, pulmonary, gi and gu systems are negative, except as otherwise noted.      Objective    /87 (BP Location: Right arm, Patient Position: Sitting, Cuff Size: Adult Large)   Pulse 83   Temp 98.3  F (36.8  C) (Tympanic)   Ht 1.829 m (6')   Wt (!) 155.4 kg (342 lb 9.6 oz)   SpO2 97%   BMI 46.46 kg/m    Body mass index is 46.46 kg/m .  Physical Exam   GENERAL: healthy,  alert and no distress  NECK: no adenopathy, no asymmetry, masses, or scars and thyroid normal to palpation  RESP: lungs clear to auscultation - no rales, rhonchi or wheezes  CV: regular rate and rhythm, normal S1 S2, no S3 or S4, no murmur, click or rub, no peripheral edema and peripheral pulses strong  ABDOMEN: soft, nontender, no hepatosplenomegaly, no masses and bowel sounds normal  MS: no gross musculoskeletal defects noted, no edema    Results for orders placed or performed in visit on 04/25/23   Hemoglobin A1c     Status: Abnormal   Result Value Ref Range    Estimated Average Glucose 146 mg/dL    Hemoglobin A1C 6.7 (H) <5.7 %   Basic metabolic panel     Status: Abnormal   Result Value Ref Range    Sodium 137 136 - 145 mmol/L    Potassium 4.0 3.4 - 5.3 mmol/L    Chloride 101 98 - 107 mmol/L    Carbon Dioxide (CO2) 25 22 - 29 mmol/L    Anion Gap 11 7 - 15 mmol/L    Urea Nitrogen 17.5 6.0 - 20.0 mg/dL    Creatinine 0.69 0.67 - 1.17 mg/dL    Calcium 10.1 (H) 8.6 - 10.0 mg/dL    Glucose 119 (H) 70 - 99 mg/dL    GFR Estimate >90 >60 mL/min/1.73m2

## 2023-04-25 ENCOUNTER — OFFICE VISIT (OUTPATIENT)
Dept: FAMILY MEDICINE | Facility: OTHER | Age: 58
End: 2023-04-25
Attending: FAMILY MEDICINE
Payer: COMMERCIAL

## 2023-04-25 ENCOUNTER — LAB (OUTPATIENT)
Dept: LAB | Facility: OTHER | Age: 58
End: 2023-04-25
Attending: FAMILY MEDICINE
Payer: COMMERCIAL

## 2023-04-25 VITALS
WEIGHT: 315 LBS | OXYGEN SATURATION: 97 % | HEART RATE: 83 BPM | HEIGHT: 72 IN | BODY MASS INDEX: 42.66 KG/M2 | SYSTOLIC BLOOD PRESSURE: 131 MMHG | TEMPERATURE: 98.3 F | DIASTOLIC BLOOD PRESSURE: 87 MMHG

## 2023-04-25 DIAGNOSIS — E11.9 TYPE 2 DIABETES MELLITUS WITHOUT COMPLICATION, WITHOUT LONG-TERM CURRENT USE OF INSULIN (H): Primary | ICD-10-CM

## 2023-04-25 DIAGNOSIS — E66.01 MORBID OBESITY DUE TO EXCESS CALORIES (H): ICD-10-CM

## 2023-04-25 DIAGNOSIS — T88.7XXA MEDICATION SIDE EFFECTS: ICD-10-CM

## 2023-04-25 DIAGNOSIS — I10 ESSENTIAL HYPERTENSION WITH GOAL BLOOD PRESSURE LESS THAN 140/90: ICD-10-CM

## 2023-04-25 DIAGNOSIS — E11.9 TYPE 2 DIABETES MELLITUS WITHOUT COMPLICATION, WITHOUT LONG-TERM CURRENT USE OF INSULIN (H): ICD-10-CM

## 2023-04-25 LAB
ANION GAP SERPL CALCULATED.3IONS-SCNC: 11 MMOL/L (ref 7–15)
BUN SERPL-MCNC: 17.5 MG/DL (ref 6–20)
CALCIUM SERPL-MCNC: 10.1 MG/DL (ref 8.6–10)
CHLORIDE SERPL-SCNC: 101 MMOL/L (ref 98–107)
CREAT SERPL-MCNC: 0.69 MG/DL (ref 0.67–1.17)
DEPRECATED HCO3 PLAS-SCNC: 25 MMOL/L (ref 22–29)
EST. AVERAGE GLUCOSE BLD GHB EST-MCNC: 146 MG/DL
GFR SERPL CREATININE-BSD FRML MDRD: >90 ML/MIN/1.73M2
GLUCOSE SERPL-MCNC: 119 MG/DL (ref 70–99)
HBA1C MFR BLD: 6.7 %
POTASSIUM SERPL-SCNC: 4 MMOL/L (ref 3.4–5.3)
SODIUM SERPL-SCNC: 137 MMOL/L (ref 136–145)

## 2023-04-25 PROCEDURE — 80048 BASIC METABOLIC PNL TOTAL CA: CPT

## 2023-04-25 PROCEDURE — 36415 COLL VENOUS BLD VENIPUNCTURE: CPT

## 2023-04-25 PROCEDURE — 99215 OFFICE O/P EST HI 40 MIN: CPT | Performed by: FAMILY MEDICINE

## 2023-04-25 PROCEDURE — 83036 HEMOGLOBIN GLYCOSYLATED A1C: CPT

## 2023-04-25 RX ORDER — METFORMIN HCL 500 MG
500 TABLET, EXTENDED RELEASE 24 HR ORAL 2 TIMES DAILY WITH MEALS
Qty: 360 TABLET | Refills: 3 | Status: SHIPPED | OUTPATIENT
Start: 2023-04-25 | End: 2023-09-26

## 2023-04-25 ASSESSMENT — PAIN SCALES - GENERAL: PAINLEVEL: MILD PAIN (3)

## 2023-05-01 ENCOUNTER — MYC MEDICAL ADVICE (OUTPATIENT)
Dept: FAMILY MEDICINE | Facility: OTHER | Age: 58
End: 2023-05-01

## 2023-05-05 ENCOUNTER — TELEPHONE (OUTPATIENT)
Dept: INTERVENTIONAL RADIOLOGY/VASCULAR | Facility: HOSPITAL | Age: 58
End: 2023-05-05

## 2023-05-05 NOTE — TELEPHONE ENCOUNTER
Left a message to remind patient of their adilia on 5/8. Also reminded patient to not take any antibiotics, steroids, or immunizations two weeks before and after this appt. And they need a .     Alicia Harvey

## 2023-05-08 ENCOUNTER — HOSPITAL ENCOUNTER (OUTPATIENT)
Dept: INTERVENTIONAL RADIOLOGY/VASCULAR | Facility: HOSPITAL | Age: 58
Discharge: HOME | End: 2023-05-08
Attending: FAMILY MEDICINE | Admitting: RADIOLOGY
Payer: COMMERCIAL

## 2023-05-08 ENCOUNTER — HOSPITAL ENCOUNTER (OUTPATIENT)
Facility: HOSPITAL | Age: 58
Discharge: HOME | End: 2023-05-08
Attending: RADIOLOGY | Admitting: RADIOLOGY
Payer: COMMERCIAL

## 2023-05-08 DIAGNOSIS — M51.369 DDD (DEGENERATIVE DISC DISEASE), LUMBAR: ICD-10-CM

## 2023-05-08 PROCEDURE — 250N000011 HC RX IP 250 OP 636: Performed by: RADIOLOGY

## 2023-05-08 PROCEDURE — 62323 NJX INTERLAMINAR LMBR/SAC: CPT

## 2023-05-08 RX ORDER — IOPAMIDOL 612 MG/ML
15 INJECTION, SOLUTION INTRATHECAL ONCE
Status: COMPLETED | OUTPATIENT
Start: 2023-05-08 | End: 2023-05-08

## 2023-05-08 RX ORDER — DEXAMETHASONE SODIUM PHOSPHATE 10 MG/ML
10 INJECTION, SOLUTION INTRAMUSCULAR; INTRAVENOUS ONCE
Status: COMPLETED | OUTPATIENT
Start: 2023-05-08 | End: 2023-05-08

## 2023-05-08 RX ADMIN — DEXAMETHASONE SODIUM PHOSPHATE 10 MG: 10 INJECTION INTRAMUSCULAR; INTRAVENOUS at 10:23

## 2023-05-08 RX ADMIN — IOPAMIDOL 6 ML: 612 INJECTION, SOLUTION INTRATHECAL at 10:23

## 2023-05-08 ASSESSMENT — ACTIVITIES OF DAILY LIVING (ADL)
ADLS_ACUITY_SCORE: 35

## 2023-05-08 NOTE — DISCHARGE INSTRUCTIONS
Home number on file [phone] (home)  Is it ok to leave a message at this number(s)? Yes    Dr THOMAS completed your procedure on 5/8/2023.    Current Pain Level (0-10 Scale): 2/10  Post Pain Level (0-10):  0/10    Radiology Discharge instructions for Steroid Injection    Activity Level:     Do not do any heavy activity or exercise for 24 hours.   Do not drive for 4 hours after your injection.  Diet:   Return to your normal diet.  Medications:   If you have stopped taking your Aspirin, Coumadin/Warfarin, Ibuprofen, or any   other blood thinner for this procedure you may resume in the morning unless   your primary care provider has given you other instructions.    Diabetics may see an increase in blood sugar after steroid injections. If you are concerned about your blood sugar, please contact your family doctor.    Site Care:  Remove the bandage and bathe or shower the morning after the procedure.      Please allow two weeks to experience improvement in your pain.  If you have any further issues, please contact your provider.    Call your Primary Care Provider if you have the following (if your primary care provider is not available please seek emergency care):   Nausea with vomiting   Severe headache   Drowsiness or confusion   Redness or drainage at the injection or puncture site   Temperature over 101 degrees F   Other concerns   Worsening back pain   Stiff neck

## 2023-07-19 DIAGNOSIS — M79.662 PAIN OF LEFT LOWER LEG: ICD-10-CM

## 2023-07-19 DIAGNOSIS — Z96.642 HISTORY OF TOTAL LEFT HIP ARTHROPLASTY: ICD-10-CM

## 2023-07-19 DIAGNOSIS — M16.11 PRIMARY OSTEOARTHRITIS OF RIGHT HIP: ICD-10-CM

## 2023-07-20 RX ORDER — DICLOFENAC SODIUM 75 MG/1
75 TABLET, DELAYED RELEASE ORAL 2 TIMES DAILY
Qty: 60 TABLET | Refills: 4 | Status: SHIPPED | OUTPATIENT
Start: 2023-07-20 | End: 2024-05-06

## 2023-07-20 NOTE — TELEPHONE ENCOUNTER
diclofenac      Last Written Prescription Date:  12/20/2022  Last Fill Quantity: 60,   # refills: 4  Last Office Visit: 4/25/2023  Future Office visit:    Next 5 appointments (look out 90 days)    Sep 26, 2023  3:45 PM  (Arrive by 3:30 PM)  SHORT with Zak Schrader MD  [facility] [place] ([facility] [place] ) [address]  [place] [address]  [phone]           Routing refill request to provider for review/approval because:

## 2023-09-26 ENCOUNTER — LAB (OUTPATIENT)
Dept: LAB | Facility: OTHER | Age: 58
End: 2023-09-26
Attending: FAMILY MEDICINE
Payer: COMMERCIAL

## 2023-09-26 ENCOUNTER — OFFICE VISIT (OUTPATIENT)
Dept: FAMILY MEDICINE | Facility: OTHER | Age: 58
End: 2023-09-26
Attending: FAMILY MEDICINE
Payer: COMMERCIAL

## 2023-09-26 VITALS
DIASTOLIC BLOOD PRESSURE: 70 MMHG | WEIGHT: 315 LBS | TEMPERATURE: 97.6 F | HEART RATE: 80 BPM | OXYGEN SATURATION: 96 % | SYSTOLIC BLOOD PRESSURE: 136 MMHG | BODY MASS INDEX: 46.55 KG/M2

## 2023-09-26 DIAGNOSIS — E11.9 TYPE 2 DIABETES MELLITUS WITHOUT COMPLICATION, WITHOUT LONG-TERM CURRENT USE OF INSULIN (H): ICD-10-CM

## 2023-09-26 DIAGNOSIS — I10 ESSENTIAL HYPERTENSION WITH GOAL BLOOD PRESSURE LESS THAN 140/90: ICD-10-CM

## 2023-09-26 DIAGNOSIS — E78.00 PURE HYPERCHOLESTEROLEMIA: ICD-10-CM

## 2023-09-26 DIAGNOSIS — E11.9 TYPE 2 DIABETES MELLITUS WITHOUT COMPLICATION, WITHOUT LONG-TERM CURRENT USE OF INSULIN (H): Primary | ICD-10-CM

## 2023-09-26 DIAGNOSIS — Z71.85 VACCINE COUNSELING: ICD-10-CM

## 2023-09-26 DIAGNOSIS — G47.33 OSA (OBSTRUCTIVE SLEEP APNEA): ICD-10-CM

## 2023-09-26 DIAGNOSIS — E66.01 MORBID OBESITY DUE TO EXCESS CALORIES (H): ICD-10-CM

## 2023-09-26 LAB
ANION GAP SERPL CALCULATED.3IONS-SCNC: 14 MMOL/L (ref 7–15)
BUN SERPL-MCNC: 22.4 MG/DL (ref 6–20)
CALCIUM SERPL-MCNC: 9.9 MG/DL (ref 8.6–10)
CHLORIDE SERPL-SCNC: 103 MMOL/L (ref 98–107)
CREAT SERPL-MCNC: 0.85 MG/DL (ref 0.67–1.17)
DEPRECATED HCO3 PLAS-SCNC: 22 MMOL/L (ref 22–29)
EGFRCR SERPLBLD CKD-EPI 2021: >90 ML/MIN/1.73M2
EST. AVERAGE GLUCOSE BLD GHB EST-MCNC: 174 MG/DL
GLUCOSE SERPL-MCNC: 117 MG/DL (ref 70–99)
HBA1C MFR BLD: 7.7 %
POTASSIUM SERPL-SCNC: 4.6 MMOL/L (ref 3.4–5.3)
SODIUM SERPL-SCNC: 139 MMOL/L (ref 135–145)

## 2023-09-26 PROCEDURE — 36415 COLL VENOUS BLD VENIPUNCTURE: CPT

## 2023-09-26 PROCEDURE — 99214 OFFICE O/P EST MOD 30 MIN: CPT | Performed by: FAMILY MEDICINE

## 2023-09-26 PROCEDURE — 80048 BASIC METABOLIC PNL TOTAL CA: CPT

## 2023-09-26 PROCEDURE — 83036 HEMOGLOBIN GLYCOSYLATED A1C: CPT

## 2023-09-26 RX ORDER — METFORMIN HCL 500 MG
1000 TABLET, EXTENDED RELEASE 24 HR ORAL 2 TIMES DAILY WITH MEALS
Qty: 360 TABLET | Refills: 3 | Status: SHIPPED | OUTPATIENT
Start: 2023-09-26 | End: 2024-09-06

## 2023-09-26 ASSESSMENT — PAIN SCALES - GENERAL: PAINLEVEL: NO PAIN (0)

## 2023-09-26 NOTE — PROGRESS NOTES
Assessment & Plan     Type 2 diabetes mellitus without complication, without long-term current use of insulin (H)  Worse control. Pt's insurance not pay for GLP1 well. Will try again to go up on Glucophage- if fails then consider adding low dose amaryl. Follow-up in 4-5 months . Needs eye exam  - Hemoglobin A1c; Future  - Basic metabolic panel; Future  - Adult Eye  Referral; Future  - metFORMIN (GLUCOPHAGE XR) 500 MG 24 hr tablet; Take 2 tablets (1,000 mg) by mouth 2 times daily (with meals)    Pure hypercholesterolemia  Stable on statin . Fasting labs next visit   - Hemoglobin A1c; Future  - Basic metabolic panel; Future    Essential hypertension with goal blood pressure less than 140/90  Stable - Continue current medications and behavioral changes.   - Hemoglobin A1c; Future  - Basic metabolic panel; Future    Vaccine counseling  Discussed. Refused all  - Hemoglobin A1c; Future  - Basic metabolic panel; Future    GIULIANA (obstructive sleep apnea)  DME Rx given   - CPAP Order for DME - ONLY FOR DME    Morbid obesity due to excess calories (H)  Discussion on GLP1 vs Bariatric surgery - no decisions made             BMI:   Estimated body mass index is 46.55 kg/m  as calculated from the following:    Height as of 4/25/23: 1.829 m (6').    Weight as of this encounter: 155.7 kg (343 lb 4 oz).         No follow-ups on file.    Zak Schrader MD  [facility] - JEFF Carroll is a 57 year old, presenting for the following health issues:  Diabetes, Hypertension, and Lipids        9/26/2023     3:45 PM   Additional Questions   Roomed by Malorie Nath   Accompanied by None         9/26/2023     3:45 PM   Patient Reported Additional Medications   Patient reports taking the following new medications None       HPI       Patient is needing a new DME order for a CPAP Machine sent to [facility]     Diabetes Follow-up    How often are you checking your blood sugar? Not at all  What  concerns do you have today about your diabetes? None and Other: just wanting to make sure his A1C is below 7    Do you have any of these symptoms? (Select all that apply)  No numbness or tingling in feet.  No redness, sores or blisters on feet.  No complaints of excessive thirst.  No reports of blurry vision.  No significant changes to weight.  Have you had a diabetic eye exam in the last 12 months? No        BP Readings from Last 2 Encounters:   09/26/23 (!) 149/92   04/25/23 131/87     Hemoglobin A1C (%)   Date Value   09/26/2023 7.7 (H)   04/25/2023 6.7 (H)   02/10/2021 6.4 (H)   08/10/2020 6.4 (H)     LDL Cholesterol Calculated (mg/dL)   Date Value   12/20/2022 114 (H)   02/14/2022 104 (H)   02/10/2021 94   01/15/2020 115 (H)             Hypertension Follow-up    Do you check your blood pressure regularly outside of the clinic? No   Are you following a low salt diet? No  Are your blood pressures ever more than 140 on the top number (systolic) OR more   than 90 on the bottom number (diastolic), for example 140/90? Unknown patient is not checking blood pressure outside of the clinic    Hyperlipidemia Follow-Up    Are you regularly taking any medication or supplement to lower your cholesterol?   Yes- Lipitor   Are you having muscle aches or other side effects that you think could be caused by your cholesterol lowering medication?  No        Review of Systems   Constitutional, HEENT, cardiovascular, pulmonary, gi and gu systems are negative, except as otherwise noted.      Objective    /70 (BP Location: Right arm, Patient Position: Sitting, Cuff Size: Adult Large)   Pulse 80   Temp 97.6  F (36.4  C) (Tympanic)   Wt (!) 155.7 kg (343 lb 4 oz)   SpO2 96%   BMI 46.55 kg/m    Body mass index is 46.55 kg/m .  Physical Exam   GENERAL: healthy, alert and no distress  NECK: no adenopathy, no asymmetry, masses, or scars and thyroid normal to palpation  RESP: lungs clear to auscultation - no rales, rhonchi or  wheezes  CV: regular rate and rhythm, normal S1 S2, no S3 or S4, no murmur, click or rub, no peripheral edema and peripheral pulses strong  ABDOMEN: soft, nontender, no hepatosplenomegaly, no masses and bowel sounds normal  MS: no gross musculoskeletal defects noted, no edema        Results for orders placed or performed in visit on 09/26/23   Basic metabolic panel     Status: Abnormal   Result Value Ref Range    Sodium 139 135 - 145 mmol/L    Potassium 4.6 3.4 - 5.3 mmol/L    Chloride 103 98 - 107 mmol/L    Carbon Dioxide (CO2) 22 22 - 29 mmol/L    Anion Gap 14 7 - 15 mmol/L    Urea Nitrogen 22.4 (H) 6.0 - 20.0 mg/dL    Creatinine 0.85 0.67 - 1.17 mg/dL    GFR Estimate >90 >60 mL/min/1.73m2    Calcium 9.9 8.6 - 10.0 mg/dL    Glucose 117 (H) 70 - 99 mg/dL   Hemoglobin A1c     Status: Abnormal   Result Value Ref Range    Estimated Average Glucose 174 mg/dL    Hemoglobin A1C 7.7 (H) <5.7 %

## 2023-11-08 ENCOUNTER — TRANSFERRED RECORDS (OUTPATIENT)
Dept: HEALTH INFORMATION MANAGEMENT | Facility: CLINIC | Age: 58
End: 2023-11-08

## 2023-11-08 LAB — RETINOPATHY: NEGATIVE

## 2023-11-20 DIAGNOSIS — E11.9 TYPE 2 DIABETES MELLITUS WITHOUT COMPLICATION, WITHOUT LONG-TERM CURRENT USE OF INSULIN (H): ICD-10-CM

## 2023-11-20 DIAGNOSIS — E78.00 PURE HYPERCHOLESTEROLEMIA: ICD-10-CM

## 2023-11-20 DIAGNOSIS — I10 ESSENTIAL HYPERTENSION WITH GOAL BLOOD PRESSURE LESS THAN 140/90: ICD-10-CM

## 2023-11-20 NOTE — TELEPHONE ENCOUNTER
Lipitor      Last Written Prescription Date:  12/20/22  Last Fill Quantity: 90,   # refills: 3  Last Office Visit: 09/26/23  Future Office visit:    Next 5 appointments (look out 90 days)      Jan 29, 2024  3:15 PM  (Arrive by 3:00 PM)  SHORT with Zak Schrader MD  [facility] [place] ([facility] [place] ) [address]  [place] [address]  [phone]               HCTZ      Last Written Prescription Date:  12/20/22  Last Fill Quantity: 90,   # refills: 3  Last Office Visit: 09/26/23  Future Office visit:    Next 5 appointments (look out 90 days)      Jan 29, 2024  3:15 PM  (Arrive by 3:00 PM)  SHORT with Zak Schrader MD  [facility] [place] ([facility] [place] ) [address]  [place] [address]  [phone]               Cozaar      Last Written Prescription Date:  12/20/22  Last Fill Quantity: 90,   # refills: 3  Last Office Visit: 09/26/23  Future Office visit:    Next 5 appointments (look out 90 days)      Jan 29, 2024  3:15 PM  (Arrive by 3:00 PM)  SHORT with Zak Schrader MD  [facility] ([facility] [place] ) [address]  [place] [address]  [phone]

## 2023-11-21 RX ORDER — LOSARTAN POTASSIUM 50 MG/1
50 TABLET ORAL DAILY
Qty: 90 TABLET | Refills: 0 | Status: SHIPPED | OUTPATIENT
Start: 2023-11-21 | End: 2024-01-29

## 2023-11-21 RX ORDER — ATORVASTATIN CALCIUM 10 MG/1
10 TABLET, FILM COATED ORAL DAILY
Qty: 90 TABLET | Refills: 0 | Status: SHIPPED | OUTPATIENT
Start: 2023-11-21 | End: 2024-01-29

## 2023-11-21 RX ORDER — HYDROCHLOROTHIAZIDE 12.5 MG/1
12.5 TABLET ORAL DAILY
Qty: 90 TABLET | Refills: 0 | Status: SHIPPED | OUTPATIENT
Start: 2023-11-21 | End: 2024-01-29

## 2024-01-29 ENCOUNTER — OFFICE VISIT (OUTPATIENT)
Dept: FAMILY MEDICINE | Facility: OTHER | Age: 59
End: 2024-01-29
Attending: FAMILY MEDICINE
Payer: COMMERCIAL

## 2024-01-29 ENCOUNTER — LAB (OUTPATIENT)
Dept: LAB | Facility: OTHER | Age: 59
End: 2024-01-29
Attending: FAMILY MEDICINE
Payer: COMMERCIAL

## 2024-01-29 VITALS
BODY MASS INDEX: 47.88 KG/M2 | OXYGEN SATURATION: 95 % | DIASTOLIC BLOOD PRESSURE: 85 MMHG | HEART RATE: 79 BPM | WEIGHT: 315 LBS | SYSTOLIC BLOOD PRESSURE: 137 MMHG | TEMPERATURE: 97.7 F

## 2024-01-29 DIAGNOSIS — Z12.5 SCREENING FOR PROSTATE CANCER: ICD-10-CM

## 2024-01-29 DIAGNOSIS — E11.9 TYPE 2 DIABETES MELLITUS WITHOUT COMPLICATION, WITHOUT LONG-TERM CURRENT USE OF INSULIN (H): Primary | ICD-10-CM

## 2024-01-29 DIAGNOSIS — E11.9 TYPE 2 DIABETES MELLITUS WITHOUT COMPLICATION, WITHOUT LONG-TERM CURRENT USE OF INSULIN (H): ICD-10-CM

## 2024-01-29 DIAGNOSIS — E66.01 MORBID OBESITY DUE TO EXCESS CALORIES (H): ICD-10-CM

## 2024-01-29 DIAGNOSIS — E78.00 PURE HYPERCHOLESTEROLEMIA: ICD-10-CM

## 2024-01-29 DIAGNOSIS — I10 ESSENTIAL HYPERTENSION WITH GOAL BLOOD PRESSURE LESS THAN 140/90: ICD-10-CM

## 2024-01-29 LAB
ALBUMIN SERPL BCG-MCNC: 4.6 G/DL (ref 3.5–5.2)
ALP SERPL-CCNC: 75 U/L (ref 40–150)
ALT SERPL W P-5'-P-CCNC: 40 U/L (ref 0–70)
ANION GAP SERPL CALCULATED.3IONS-SCNC: 13 MMOL/L (ref 7–15)
AST SERPL W P-5'-P-CCNC: 23 U/L (ref 0–45)
BASOPHILS # BLD AUTO: 0 10E3/UL (ref 0–0.2)
BASOPHILS NFR BLD AUTO: 0 %
BILIRUB SERPL-MCNC: 0.3 MG/DL
BUN SERPL-MCNC: 22.5 MG/DL (ref 8–23)
CALCIUM SERPL-MCNC: 10 MG/DL (ref 8.6–10)
CHLORIDE SERPL-SCNC: 100 MMOL/L (ref 98–107)
CHOLEST SERPL-MCNC: 158 MG/DL
CREAT SERPL-MCNC: 0.78 MG/DL (ref 0.67–1.17)
CREAT UR-MCNC: 94.6 MG/DL
DEPRECATED HCO3 PLAS-SCNC: 23 MMOL/L (ref 22–29)
EGFRCR SERPLBLD CKD-EPI 2021: >90 ML/MIN/1.73M2
EOSINOPHIL # BLD AUTO: 0.3 10E3/UL (ref 0–0.7)
EOSINOPHIL NFR BLD AUTO: 3 %
ERYTHROCYTE [DISTWIDTH] IN BLOOD BY AUTOMATED COUNT: 13.6 % (ref 10–15)
EST. AVERAGE GLUCOSE BLD GHB EST-MCNC: 174 MG/DL
FASTING STATUS PATIENT QL REPORTED: YES
GLUCOSE SERPL-MCNC: 123 MG/DL (ref 70–99)
HBA1C MFR BLD: 7.7 %
HCT VFR BLD AUTO: 39.4 % (ref 40–53)
HDLC SERPL-MCNC: 45 MG/DL
HGB BLD-MCNC: 13.7 G/DL (ref 13.3–17.7)
IMM GRANULOCYTES # BLD: 0 10E3/UL
IMM GRANULOCYTES NFR BLD: 0 %
LDLC SERPL CALC-MCNC: 88 MG/DL
LYMPHOCYTES # BLD AUTO: 3.4 10E3/UL (ref 0.8–5.3)
LYMPHOCYTES NFR BLD AUTO: 34 %
MCH RBC QN AUTO: 30.3 PG (ref 26.5–33)
MCHC RBC AUTO-ENTMCNC: 34.8 G/DL (ref 31.5–36.5)
MCV RBC AUTO: 87 FL (ref 78–100)
MICROALBUMIN UR-MCNC: <12 MG/L
MICROALBUMIN/CREAT UR: NORMAL MG/G{CREAT}
MONOCYTES # BLD AUTO: 0.8 10E3/UL (ref 0–1.3)
MONOCYTES NFR BLD AUTO: 7 %
NEUTROPHILS # BLD AUTO: 5.7 10E3/UL (ref 1.6–8.3)
NEUTROPHILS NFR BLD AUTO: 56 %
NONHDLC SERPL-MCNC: 113 MG/DL
NRBC # BLD AUTO: 0 10E3/UL
NRBC BLD AUTO-RTO: 0 /100
PLATELET # BLD AUTO: 265 10E3/UL (ref 150–450)
POTASSIUM SERPL-SCNC: 3.9 MMOL/L (ref 3.4–5.3)
PROT SERPL-MCNC: 7.3 G/DL (ref 6.4–8.3)
PSA SERPL DL<=0.01 NG/ML-MCNC: 0.2 NG/ML (ref 0–3.5)
RBC # BLD AUTO: 4.52 10E6/UL (ref 4.4–5.9)
SODIUM SERPL-SCNC: 136 MMOL/L (ref 135–145)
TRIGL SERPL-MCNC: 126 MG/DL
TSH SERPL DL<=0.005 MIU/L-ACNC: 0.81 UIU/ML (ref 0.3–4.2)
WBC # BLD AUTO: 10.2 10E3/UL (ref 4–11)

## 2024-01-29 PROCEDURE — 99214 OFFICE O/P EST MOD 30 MIN: CPT | Performed by: FAMILY MEDICINE

## 2024-01-29 PROCEDURE — 99207 PR FOOT EXAM NO CHARGE: CPT | Performed by: FAMILY MEDICINE

## 2024-01-29 PROCEDURE — 82570 ASSAY OF URINE CREATININE: CPT

## 2024-01-29 PROCEDURE — G0103 PSA SCREENING: HCPCS

## 2024-01-29 PROCEDURE — 80050 GENERAL HEALTH PANEL: CPT

## 2024-01-29 PROCEDURE — 82043 UR ALBUMIN QUANTITATIVE: CPT

## 2024-01-29 PROCEDURE — 83036 HEMOGLOBIN GLYCOSYLATED A1C: CPT

## 2024-01-29 PROCEDURE — 36415 COLL VENOUS BLD VENIPUNCTURE: CPT

## 2024-01-29 PROCEDURE — 80061 LIPID PANEL: CPT

## 2024-01-29 RX ORDER — HYDROCHLOROTHIAZIDE 12.5 MG/1
12.5 TABLET ORAL DAILY
Qty: 90 TABLET | Refills: 3 | Status: SHIPPED | OUTPATIENT
Start: 2024-01-29

## 2024-01-29 RX ORDER — ATORVASTATIN CALCIUM 10 MG/1
10 TABLET, FILM COATED ORAL DAILY
Qty: 90 TABLET | Refills: 3 | Status: SHIPPED | OUTPATIENT
Start: 2024-01-29

## 2024-01-29 RX ORDER — DAPAGLIFLOZIN 5 MG/1
5 TABLET, FILM COATED ORAL DAILY
Qty: 90 TABLET | Refills: 1 | Status: SHIPPED | OUTPATIENT
Start: 2024-01-29 | End: 2024-06-03

## 2024-01-29 RX ORDER — LOSARTAN POTASSIUM 50 MG/1
50 TABLET ORAL DAILY
Qty: 90 TABLET | Refills: 3 | Status: SHIPPED | OUTPATIENT
Start: 2024-01-29

## 2024-01-29 ASSESSMENT — PAIN SCALES - GENERAL: PAINLEVEL: MODERATE PAIN (4)

## 2024-01-29 NOTE — PROGRESS NOTES
Assessment & Plan     Type 2 diabetes mellitus without complication, without long-term current use of insulin (H)  A1C still little too high. Insurance had poor coverage on GLP1. Will try Farxiga - r/b discussed. Follow-up in 4 months   - CBC with Platelets & Differential; Future  - Comprehensive metabolic panel; Future  - Lipid Profile; Future  - Hemoglobin A1c; Future  - Albumin Random Urine Quantitative with Creat Ratio; Future  - TSH; Future  - TN FOOT EXAM NO CHARGE  - dapagliflozin (FARXIGA) 5 MG TABS tablet; Take 1 tablet (5 mg) by mouth daily  - atorvastatin (LIPITOR) 10 MG tablet; Take 1 tablet (10 mg) by mouth daily  - losartan (COZAAR) 50 MG tablet; Take 1 tablet (50 mg) by mouth daily    Pure hypercholesterolemia  Stable - Continue current medications and behavioral changes.   - CBC with Platelets & Differential; Future  - Comprehensive metabolic panel; Future  - Lipid Profile; Future  - Hemoglobin A1c; Future  - Albumin Random Urine Quantitative with Creat Ratio; Future  - TSH; Future  - atorvastatin (LIPITOR) 10 MG tablet; Take 1 tablet (10 mg) by mouth daily    Essential hypertension with goal blood pressure less than 140/90  Stable - Continue current medications and behavioral changes.   - CBC with Platelets & Differential; Future  - Comprehensive metabolic panel; Future  - Lipid Profile; Future  - Hemoglobin A1c; Future  - Albumin Random Urine Quantitative with Creat Ratio; Future  - TSH; Future  - hydrochlorothiazide (HYDRODIURIL) 12.5 MG tablet; Take 1 tablet (12.5 mg) by mouth daily  - losartan (COZAAR) 50 MG tablet; Take 1 tablet (50 mg) by mouth daily    Morbid obesity due to excess calories (H)  Pt aware. Too bad GLP1 not covered better. . Discussed behavioral changes to improve obesity including increase diet, decreasing carbs along with other changes in diet and consider seeing dietician or enroll in a watch watcher's type program.   - CBC with Platelets & Differential; Future  -  Comprehensive metabolic panel; Future  - Lipid Profile; Future  - Hemoglobin A1c; Future  - Albumin Random Urine Quantitative with Creat Ratio; Future  - TSH; Future    Screening for prostate cancer  Psa ok   - Prostate Specific Antigen Screen; Future            BMI  Estimated body mass index is 47.88 kg/m  as calculated from the following:    Height as of 4/25/23: 1.829 m (6').    Weight as of this encounter: 160.1 kg (353 lb).   Weight management plan: Discussed healthy diet and exercise guidelines        No follow-ups on file.    Lindsey Carroll is a 58 year old, presenting for the following health issues:  Diabetes, Lipids, and Hypertension        1/29/2024     3:10 PM   Additional Questions   Roomed by Roberta LANE LPN   Accompanied by self     HPI     Diabetes Follow-up    How often are you checking your blood sugar? Not at all  What concerns do you have today about your diabetes? None   Do you have any of these symptoms? (Select all that apply)  Weight gain          Hyperlipidemia Follow-Up    Are you regularly taking any medication or supplement to lower your cholesterol?   Yes- Lipitor   Are you having muscle aches or other side effects that you think could be caused by your cholesterol lowering medication?  No    Hypertension Follow-up    Do you check your blood pressure regularly outside of the clinic? No   Are you following a low salt diet? No but does not add any extra salt to food  Are your blood pressures ever more than 140 on the top number (systolic) OR more   than 90 on the bottom number (diastolic), for example 140/90? Unknown patient is not checking blood pressure outside of the clinic     BP Readings from Last 2 Encounters:   01/29/24 137/85   09/26/23 136/70     Hemoglobin A1C (%)   Date Value   01/29/2024 7.7 (H)   09/26/2023 7.7 (H)   02/10/2021 6.4 (H)   08/10/2020 6.4 (H)     LDL Cholesterol Calculated (mg/dL)   Date Value   12/20/2022 114 (H)   02/14/2022 104 (H)   02/10/2021 94    01/15/2020 115 (H)             Review of Systems  Constitutional, neuro, ENT, endocrine, pulmonary, cardiac, gastrointestinal, genitourinary, musculoskeletal, integument and psychiatric systems are negative, except as otherwise noted.      Objective    /85 (BP Location: Right arm, Patient Position: Sitting, Cuff Size: Adult Large)   Pulse 79   Temp 97.7  F (36.5  C) (Tympanic)   Wt (!) 160.1 kg (353 lb)   SpO2 95%   BMI 47.88 kg/m    Body mass index is 47.88 kg/m .  Physical Exam   GENERAL: alert and no distress  EYES: Eyes grossly normal to inspection, PERRL and conjunctivae and sclerae normal  HENT: ear canals and TM's normal, nose and mouth without ulcers or lesions  NECK: no adenopathy, no asymmetry, masses, or scars  RESP: lungs clear to auscultation - no rales, rhonchi or wheezes  CV: regular rate and rhythm, normal S1 S2, no S3 or S4, no murmur, click or rub, no peripheral edema  ABDOMEN: soft, nontender, no hepatosplenomegaly, no masses and bowel sounds normal  MS: no gross musculoskeletal defects noted, no edema  SKIN: no suspicious lesions or rashes  NEURO: Normal strength and tone, mentation intact and speech normal  PSYCH: mentation appears normal, affect normal/bright  LYMPH: no cervical, supraclavicular, axillary, or inguinal adenopathy    Results for orders placed or performed in visit on 01/29/24   TSH     Status: Normal   Result Value Ref Range    TSH 0.81 0.30 - 4.20 uIU/mL   Albumin Random Urine Quantitative with Creat Ratio     Status: None   Result Value Ref Range    Creatinine Urine mg/dL 94.6 mg/dL    Albumin Urine mg/L <12.0 mg/L    Albumin Urine mg/g Cr     Hemoglobin A1c     Status: Abnormal   Result Value Ref Range    Estimated Average Glucose 174 mg/dL    Hemoglobin A1C 7.7 (H) <5.7 %   Lipid Profile     Status: None   Result Value Ref Range    Cholesterol 158 <200 mg/dL    Triglycerides 126 <150 mg/dL    Direct Measure HDL 45 >=40 mg/dL    LDL Cholesterol Calculated 88 <=100  mg/dL    Non HDL Cholesterol 113 <130 mg/dL    Patient Fasting > 8hrs? Yes     Narrative    Cholesterol  Desirable:  <200 mg/dL    Triglycerides  Normal:  Less than 150 mg/dL  Borderline High:  150-199 mg/dL  High:  200-499 mg/dL  Very High:  Greater than or equal to 500 mg/dL    Direct Measure HDL  Female:  Greater than or equal to 50 mg/dL   Male:  Greater than or equal to 40 mg/dL    LDL Cholesterol  Desirable:  <100mg/dL  Above Desirable:  100-129 mg/dL   Borderline High:  130-159 mg/dL   High:  160-189 mg/dL   Very High:  >= 190 mg/dL    Non HDL Cholesterol  Desirable:  130 mg/dL  Above Desirable:  130-159 mg/dL  Borderline High:  160-189 mg/dL  High:  190-219 mg/dL  Very High:  Greater than or equal to 220 mg/dL   Comprehensive metabolic panel     Status: Abnormal   Result Value Ref Range    Sodium 136 135 - 145 mmol/L    Potassium 3.9 3.4 - 5.3 mmol/L    Carbon Dioxide (CO2) 23 22 - 29 mmol/L    Anion Gap 13 7 - 15 mmol/L    Urea Nitrogen 22.5 8.0 - 23.0 mg/dL    Creatinine 0.78 0.67 - 1.17 mg/dL    GFR Estimate >90 >60 mL/min/1.73m2    Calcium 10.0 8.6 - 10.0 mg/dL    Chloride 100 98 - 107 mmol/L    Glucose 123 (H) 70 - 99 mg/dL    Alkaline Phosphatase 75 40 - 150 U/L    AST 23 0 - 45 U/L    ALT 40 0 - 70 U/L    Protein Total 7.3 6.4 - 8.3 g/dL    Albumin 4.6 3.5 - 5.2 g/dL    Bilirubin Total 0.3 <=1.2 mg/dL   Prostate Specific Antigen Screen     Status: Normal   Result Value Ref Range    Prostate Specific Antigen Screen 0.20 0.00 - 3.50 ng/mL    Narrative    This result is obtained using the Roche Elecsys total PSA method on the martín [id number] immunoassay analyzer. Results obtained with different assay methods or kits cannot be used interchangeably.   CBC with platelets and differential     Status: Abnormal   Result Value Ref Range    WBC Count 10.2 4.0 - 11.0 10e3/uL    RBC Count 4.52 4.40 - 5.90 10e6/uL    Hemoglobin 13.7 13.3 - 17.7 g/dL    Hematocrit 39.4 (L) 40.0 - 53.0 %    MCV 87 78 - 100 fL    MCH 30.3  26.5 - 33.0 pg    MCHC 34.8 31.5 - 36.5 g/dL    RDW 13.6 10.0 - 15.0 %    Platelet Count 265 150 - 450 10e3/uL    % Neutrophils 56 %    % Lymphocytes 34 %    % Monocytes 7 %    % Eosinophils 3 %    % Basophils 0 %    % Immature Granulocytes 0 %    NRBCs per 100 WBC 0 <1 /100    Absolute Neutrophils 5.7 1.6 - 8.3 10e3/uL    Absolute Lymphocytes 3.4 0.8 - 5.3 10e3/uL    Absolute Monocytes 0.8 0.0 - 1.3 10e3/uL    Absolute Eosinophils 0.3 0.0 - 0.7 10e3/uL    Absolute Basophils 0.0 0.0 - 0.2 10e3/uL    Absolute Immature Granulocytes 0.0 <=0.4 10e3/uL    Absolute NRBCs 0.0 10e3/uL   CBC with Platelets & Differential     Status: Abnormal    Narrative    The following orders were created for panel order CBC with Platelets & Differential.  Procedure                               Abnormality         Status                     ---------                               -----------         ------                     CBC with platelets and d...[[id number]]  Abnormal            Final result                 Please view results for these tests on the individual orders.             Signed Electronically by: Zak Schrader MD

## 2024-02-16 ENCOUNTER — TELEPHONE (OUTPATIENT)
Dept: FAMILY MEDICINE | Facility: OTHER | Age: 59
End: 2024-02-16

## 2024-02-16 NOTE — TELEPHONE ENCOUNTER
Received a PA request from [employer] for dapagliflozin (FARXIGA) 5 MG TABS tablet. Submitted on CMM. Waiting for a response.

## 2024-02-17 ENCOUNTER — HEALTH MAINTENANCE LETTER (OUTPATIENT)
Age: 59
End: 2024-02-17

## 2024-02-19 DIAGNOSIS — E11.9 TYPE 2 DIABETES MELLITUS WITHOUT COMPLICATION, WITHOUT LONG-TERM CURRENT USE OF INSULIN (H): ICD-10-CM

## 2024-02-19 RX ORDER — DAPAGLIFLOZIN 5 MG/1
5 TABLET, FILM COATED ORAL DAILY
Qty: 90 TABLET | Refills: 3 | Status: SHIPPED | OUTPATIENT
Start: 2024-02-19 | End: 2024-06-03

## 2024-02-19 NOTE — TELEPHONE ENCOUNTER
Received a DENIAL from [facility] for  dapagliflozin (FARXIGA) 5 MG TABS tablet.                 Scanned in Epic.

## 2024-05-06 DIAGNOSIS — M79.662 PAIN OF LEFT LOWER LEG: ICD-10-CM

## 2024-05-06 DIAGNOSIS — Z96.642 HISTORY OF TOTAL LEFT HIP ARTHROPLASTY: ICD-10-CM

## 2024-05-06 DIAGNOSIS — M16.11 PRIMARY OSTEOARTHRITIS OF RIGHT HIP: ICD-10-CM

## 2024-05-06 RX ORDER — DICLOFENAC SODIUM 75 MG/1
75 TABLET, DELAYED RELEASE ORAL 2 TIMES DAILY
Qty: 60 TABLET | Refills: 4 | Status: SHIPPED | OUTPATIENT
Start: 2024-05-06

## 2024-06-03 ENCOUNTER — OFFICE VISIT (OUTPATIENT)
Dept: FAMILY MEDICINE | Facility: OTHER | Age: 59
End: 2024-06-03
Attending: FAMILY MEDICINE
Payer: COMMERCIAL

## 2024-06-03 ENCOUNTER — LAB (OUTPATIENT)
Dept: LAB | Facility: OTHER | Age: 59
End: 2024-06-03
Payer: COMMERCIAL

## 2024-06-03 VITALS
TEMPERATURE: 97.6 F | DIASTOLIC BLOOD PRESSURE: 70 MMHG | HEIGHT: 72 IN | RESPIRATION RATE: 18 BRPM | WEIGHT: 315 LBS | BODY MASS INDEX: 42.66 KG/M2 | OXYGEN SATURATION: 98 % | HEART RATE: 67 BPM | SYSTOLIC BLOOD PRESSURE: 125 MMHG

## 2024-06-03 DIAGNOSIS — E11.9 TYPE 2 DIABETES MELLITUS WITHOUT COMPLICATION, WITHOUT LONG-TERM CURRENT USE OF INSULIN (H): ICD-10-CM

## 2024-06-03 DIAGNOSIS — I10 ESSENTIAL HYPERTENSION WITH GOAL BLOOD PRESSURE LESS THAN 140/90: ICD-10-CM

## 2024-06-03 DIAGNOSIS — E11.9 TYPE 2 DIABETES MELLITUS WITHOUT COMPLICATION, WITHOUT LONG-TERM CURRENT USE OF INSULIN (H): Primary | ICD-10-CM

## 2024-06-03 DIAGNOSIS — R79.89 ELEVATED TSH: ICD-10-CM

## 2024-06-03 DIAGNOSIS — E66.01 MORBID OBESITY DUE TO EXCESS CALORIES (H): ICD-10-CM

## 2024-06-03 LAB
ANION GAP SERPL CALCULATED.3IONS-SCNC: 13 MMOL/L (ref 7–15)
BUN SERPL-MCNC: 15.2 MG/DL (ref 8–23)
CALCIUM SERPL-MCNC: 9.8 MG/DL (ref 8.6–10)
CHLORIDE SERPL-SCNC: 101 MMOL/L (ref 98–107)
CREAT SERPL-MCNC: 0.76 MG/DL (ref 0.67–1.17)
DEPRECATED HCO3 PLAS-SCNC: 24 MMOL/L (ref 22–29)
EGFRCR SERPLBLD CKD-EPI 2021: >90 ML/MIN/1.73M2
EST. AVERAGE GLUCOSE BLD GHB EST-MCNC: 140 MG/DL
GLUCOSE SERPL-MCNC: 120 MG/DL (ref 70–99)
HBA1C MFR BLD: 6.5 %
POTASSIUM SERPL-SCNC: 4 MMOL/L (ref 3.4–5.3)
SODIUM SERPL-SCNC: 138 MMOL/L (ref 135–145)
TSH SERPL DL<=0.005 MIU/L-ACNC: 0.21 UIU/ML (ref 0.3–4.2)

## 2024-06-03 PROCEDURE — 80048 BASIC METABOLIC PNL TOTAL CA: CPT

## 2024-06-03 PROCEDURE — 36415 COLL VENOUS BLD VENIPUNCTURE: CPT

## 2024-06-03 PROCEDURE — G2211 COMPLEX E/M VISIT ADD ON: HCPCS | Performed by: FAMILY MEDICINE

## 2024-06-03 PROCEDURE — 83036 HEMOGLOBIN GLYCOSYLATED A1C: CPT

## 2024-06-03 PROCEDURE — 84443 ASSAY THYROID STIM HORMONE: CPT

## 2024-06-03 PROCEDURE — 99214 OFFICE O/P EST MOD 30 MIN: CPT | Performed by: FAMILY MEDICINE

## 2024-06-03 ASSESSMENT — PAIN SCALES - GENERAL: PAINLEVEL: SEVERE PAIN (6)

## 2024-06-03 NOTE — PROGRESS NOTES
Assessment & Plan     Type 2 diabetes mellitus without complication, without long-term current use of insulin (H)  WAY better control - discussed. NO change in meds. Keep working on wt loss. Follow-up in 5-6 months   - Basic metabolic panel; Future  - Hemoglobin A1c; Future  - TSH; Future    Essential hypertension with goal blood pressure less than 140/90  Stable - good control.   - Basic metabolic panel; Future  - Hemoglobin A1c; Future  - TSH; Future    Morbid obesity due to excess calories (H)  Some better - Congrats. Keep it up  - Basic metabolic panel; Future  - Hemoglobin A1c; Future  - TSH; Future    Elevated TSH  Mild - recheck at next visit                 No follow-ups on file.    Lindsey Carroll is a 58 year old, presenting for the following health issues:  Diabetes, RECHECK, and Hypertension        6/3/2024     1:59 PM   Additional Questions   Roomed by Roberta Hopper LPN, CCP, MHP   Accompanied by self     HPI     **Would like to discuss prescription alternatives and cost.  Farxiga was $500 a month. He did not fill  Wt is down and working on diet and exercise       Diabetes Follow-up    How often are you checking your blood sugar? Not at all  What concerns do you have today about your diabetes?  Just would like to have things regulated.    Do you have any of these symptoms? (Select all that apply)  Weight loss      BP Readings from Last 2 Encounters:   06/03/24 125/70   01/29/24 137/85     Hemoglobin A1C (%)   Date Value   01/29/2024 7.7 (H)   09/26/2023 7.7 (H)   02/10/2021 6.4 (H)   08/10/2020 6.4 (H)     LDL Cholesterol Calculated (mg/dL)   Date Value   01/29/2024 88   12/20/2022 114 (H)   02/10/2021 94   01/15/2020 115 (H)             Hypertension Follow-up    Do you check your blood pressure regularly outside of the clinic? No   Are you following a low salt diet? No  Are your blood pressures ever more than 140 on the top number (systolic) OR more   than 90 on the bottom number (diastolic),  for example 140/90? No            Review of Systems  Constitutional, HEENT, cardiovascular, pulmonary, gi and gu systems are negative, except as otherwise noted.      Objective    /70 (BP Location: Left arm, Patient Position: Sitting, Cuff Size: Adult Large)   Pulse 67   Temp 97.6  F (36.4  C) (Tympanic)   Resp 18   Ht 1.829 m (6')   Wt (!) 153.8 kg (339 lb 1.6 oz)   SpO2 98%   BMI 45.99 kg/m    Body mass index is 45.99 kg/m .  Physical Exam   GENERAL: alert and no distress  NECK: no adenopathy, no asymmetry, masses, or scars  RESP: lungs clear to auscultation - no rales, rhonchi or wheezes  CV: regular rate and rhythm, normal S1 S2, no S3 or S4, no murmur, click or rub, no peripheral edema  ABDOMEN: soft, nontender, no hepatosplenomegaly, no masses and bowel sounds normal  MS: no gross musculoskeletal defects noted, no edema    Results for orders placed or performed in visit on 06/03/24   Basic metabolic panel     Status: Abnormal   Result Value Ref Range    Sodium 138 135 - 145 mmol/L    Potassium 4.0 3.4 - 5.3 mmol/L    Chloride 101 98 - 107 mmol/L    Carbon Dioxide (CO2) 24 22 - 29 mmol/L    Anion Gap 13 7 - 15 mmol/L    Urea Nitrogen 15.2 8.0 - 23.0 mg/dL    Creatinine 0.76 0.67 - 1.17 mg/dL    GFR Estimate >90 >60 mL/min/1.73m2    Calcium 9.8 8.6 - 10.0 mg/dL    Glucose 120 (H) 70 - 99 mg/dL   Hemoglobin A1c     Status: Abnormal   Result Value Ref Range    Estimated Average Glucose 140 mg/dL    Hemoglobin A1C 6.5 (H) <5.7 %   TSH     Status: Abnormal   Result Value Ref Range    TSH 0.21 (L) 0.30 - 4.20 uIU/mL             Signed Electronically by: Zak Schrader MD

## 2024-09-03 DIAGNOSIS — E11.9 TYPE 2 DIABETES MELLITUS WITHOUT COMPLICATION, WITHOUT LONG-TERM CURRENT USE OF INSULIN (H): ICD-10-CM

## 2024-09-05 NOTE — TELEPHONE ENCOUNTER
Metformin       Last Written Prescription Date:  9/26/2023  Last Fill Quantity: 360,   # refills: 3  Last Office Visit: 6/03/2024  Future Office visit:    Next 5 appointments (look out 90 days)      Nov 12, 2024 2:45 PM  (Arrive by 2:30 PM)  Provider Visit with Zak Schrader MD  [facility] [place] ([facility] [place] ) [address]  [place] [address]  [phone]            V-Y Flap Text: The defect edges were debeveled with a #15 scalpel blade.  Given the location of the defect, shape of the defect and the proximity to free margins a V-Y flap was deemed most appropriate.  Using a sterile surgical marker, an appropriate advancement flap was drawn incorporating the defect and placing the expected incisions within the relaxed skin tension lines where possible.    The area thus outlined was incised deep to adipose tissue with a #15 scalpel blade.  The skin margins were undermined to an appropriate distance in all directions utilizing iris scissors.

## 2024-09-06 RX ORDER — METFORMIN HCL 500 MG
1000 TABLET, EXTENDED RELEASE 24 HR ORAL 2 TIMES DAILY WITH MEALS
Qty: 360 TABLET | Refills: 0 | Status: SHIPPED | OUTPATIENT
Start: 2024-09-06

## 2024-11-12 ENCOUNTER — LAB (OUTPATIENT)
Dept: LAB | Facility: OTHER | Age: 59
End: 2024-11-12
Attending: FAMILY MEDICINE
Payer: COMMERCIAL

## 2024-11-12 ENCOUNTER — OFFICE VISIT (OUTPATIENT)
Dept: FAMILY MEDICINE | Facility: OTHER | Age: 59
End: 2024-11-12
Attending: FAMILY MEDICINE
Payer: COMMERCIAL

## 2024-11-12 VITALS
BODY MASS INDEX: 42.66 KG/M2 | OXYGEN SATURATION: 96 % | DIASTOLIC BLOOD PRESSURE: 88 MMHG | WEIGHT: 315 LBS | RESPIRATION RATE: 16 BRPM | TEMPERATURE: 97.5 F | HEIGHT: 72 IN | SYSTOLIC BLOOD PRESSURE: 135 MMHG | HEART RATE: 72 BPM

## 2024-11-12 DIAGNOSIS — I10 ESSENTIAL HYPERTENSION WITH GOAL BLOOD PRESSURE LESS THAN 140/90: ICD-10-CM

## 2024-11-12 DIAGNOSIS — E11.9 TYPE 2 DIABETES MELLITUS WITHOUT COMPLICATION, WITHOUT LONG-TERM CURRENT USE OF INSULIN (H): ICD-10-CM

## 2024-11-12 DIAGNOSIS — E66.01 MORBID OBESITY DUE TO EXCESS CALORIES (H): ICD-10-CM

## 2024-11-12 DIAGNOSIS — Z71.85 IMMUNIZATION COUNSELING: ICD-10-CM

## 2024-11-12 DIAGNOSIS — E11.9 TYPE 2 DIABETES MELLITUS WITHOUT COMPLICATION, WITHOUT LONG-TERM CURRENT USE OF INSULIN (H): Primary | ICD-10-CM

## 2024-11-12 LAB
ANION GAP SERPL CALCULATED.3IONS-SCNC: 14 MMOL/L (ref 7–15)
BUN SERPL-MCNC: 16.3 MG/DL (ref 8–23)
CALCIUM SERPL-MCNC: 9.5 MG/DL (ref 8.8–10.4)
CHLORIDE SERPL-SCNC: 102 MMOL/L (ref 98–107)
CREAT SERPL-MCNC: 0.71 MG/DL (ref 0.67–1.17)
EGFRCR SERPLBLD CKD-EPI 2021: >90 ML/MIN/1.73M2
EST. AVERAGE GLUCOSE BLD GHB EST-MCNC: 169 MG/DL
GLUCOSE SERPL-MCNC: 133 MG/DL (ref 70–99)
HBA1C MFR BLD: 7.5 %
HCO3 SERPL-SCNC: 22 MMOL/L (ref 22–29)
POTASSIUM SERPL-SCNC: 3.9 MMOL/L (ref 3.4–5.3)
SODIUM SERPL-SCNC: 138 MMOL/L (ref 135–145)

## 2024-11-12 PROCEDURE — 36415 COLL VENOUS BLD VENIPUNCTURE: CPT

## 2024-11-12 PROCEDURE — 83036 HEMOGLOBIN GLYCOSYLATED A1C: CPT

## 2024-11-12 PROCEDURE — 80048 BASIC METABOLIC PNL TOTAL CA: CPT

## 2024-11-12 ASSESSMENT — PAIN SCALES - GENERAL: PAINLEVEL_OUTOF10: MILD PAIN (2)

## 2024-11-12 NOTE — PROGRESS NOTES
Assessment & Plan     Type 2 diabetes mellitus without complication, without long-term current use of insulin (H)  A1C up-- discussed. He says he will do better.  Does not want to add  meds but looking into paying or budgeting out with his HSA and Insurance to get GLP1.  He will call or My Chart me.  Follow-up in 4 months.   - Basic metabolic panel; Future  - Hemoglobin A1c; Future  - Adult Eye  Referral; Future    Essential hypertension with goal blood pressure less than 140/90  Stable - Continue current medications and behavioral changes.   - Basic metabolic panel; Future  - Hemoglobin A1c; Future    Morbid obesity due to excess calories (H)  As above.   - Basic metabolic panel; Future  - Hemoglobin A1c; Future    Immunization counseling  Refuses all that is due           BMI  Estimated body mass index is 46.83 kg/m  as calculated from the following:    Height as of this encounter: 1.829 m (6').    Weight as of this encounter: 156.6 kg (345 lb 5 oz).             No follow-ups on file.    Lindsey Carroll is a 58 year old, presenting for the following health issues:  Diabetes and Hypertension        11/12/2024     2:41 PM   Additional Questions   Roomed by Malorie Nath   Accompanied by None         11/12/2024     2:41 PM   Patient Reported Additional Medications   Patient reports taking the following new medications None     History of Present Illness       Diabetes:   He presents for follow up of diabetes.    He is not checking blood glucose.         He has no concerns regarding his diabetes at this time.  He is having weight gain.  The patient has had a diabetic eye exam in the last 12 months. Eye exam performed on March 24. Location of last eye exam [facility] [place].        Hypertension: He presents for follow up of hypertension.  He does not check blood pressure  regularly outside of the clinic. Outside blood pressures have been over 140/90. He does not follow a low salt diet.     He eats 0-1  servings of fruits and vegetables daily.He consumes 0 sweetened beverage(s) daily.He exercises with enough effort to increase his heart rate 9 or less minutes per day.  He exercises with enough effort to increase his heart rate 3 or less days per week.   He is taking medications regularly.       Diabetes Follow-up    How often are you checking your blood sugar? Not at all  What concerns do you have today about your diabetes?Other: weight    Do you have any of these symptoms? (Select all that apply)  Weight gain  Have you had a diabetic eye exam in the last 12 months? [facility]  Not watching diet as much   Wt up some   Had son's wedding  Tried to get GLP1-- too expensive but pt may be able to pay for it       BP Readings from Last 2 Encounters:   11/12/24 135/88   06/03/24 125/70     Hemoglobin A1C (%)   Date Value   11/12/2024 7.5 (H)   06/03/2024 6.5 (H)   02/10/2021 6.4 (H)   08/10/2020 6.4 (H)     LDL Cholesterol Calculated (mg/dL)   Date Value   01/29/2024 88   12/20/2022 114 (H)   02/10/2021 94   01/15/2020 115 (H)             Hypertension Follow-up    Do you check your blood pressure regularly outside of the clinic? No   Are you following a low salt diet? No  Are your blood pressures ever more than 140 on the top number (systolic) OR more   than 90 on the bottom number (diastolic), for example 140/90? N/A          Review of Systems  Constitutional, neuro, ENT, endocrine, pulmonary, cardiac, gastrointestinal, genitourinary, musculoskeletal, integument and psychiatric systems are negative, except as otherwise noted.      Objective    /88 (BP Location: Left arm, Patient Position: Sitting, Cuff Size: Adult Large)   Pulse 72   Temp 97.5  F (36.4  C) (Tympanic)   Resp 16   Ht 1.829 m (6')   Wt (!) 156.6 kg (345 lb 5 oz)   SpO2 96%   BMI 46.83 kg/m    Body mass index is 46.83 kg/m .  Physical Exam   GENERAL: alert and no distress  NECK: no adenopathy, no asymmetry, masses, or  scars  RESP: lungs clear to auscultation - no rales, rhonchi or wheezes  CV: regular rate and rhythm, normal S1 S2, no S3 or S4, no murmur, click or rub, no peripheral edema  MS: no gross musculoskeletal defects noted, no edema    Results for orders placed or performed in visit on 11/12/24   Basic metabolic panel     Status: Abnormal   Result Value Ref Range    Sodium 138 135 - 145 mmol/L    Potassium 3.9 3.4 - 5.3 mmol/L    Chloride 102 98 - 107 mmol/L    Carbon Dioxide (CO2) 22 22 - 29 mmol/L    Anion Gap 14 7 - 15 mmol/L    Urea Nitrogen 16.3 8.0 - 23.0 mg/dL    Creatinine 0.71 0.67 - 1.17 mg/dL    GFR Estimate >90 >60 mL/min/1.73m2    Calcium 9.5 8.8 - 10.4 mg/dL    Glucose 133 (H) 70 - 99 mg/dL   Hemoglobin A1c     Status: Abnormal   Result Value Ref Range    Estimated Average Glucose 169 (H) <117 mg/dL    Hemoglobin A1C 7.5 (H) <5.7 %             Signed Electronically by: Zak Schrader MD

## 2025-03-07 NOTE — PROGRESS NOTES
Assessment & Plan     Stress due to illness of family member  Previously followed with Dr. Schrader and was here today for rescheduled diabetes follow-up.  Unfortunately since last visit his wife has been diagnosed with GBM and understandably is focused on her wellbeing for now.  Understandably struggling emotionally but taking it one day at a time.  Did offer a number of resources and will continue to provide support as able.  For now does not want to make any med changes or do labs, will be sure to reach out with concerns.  - Will continue to support as able    Type 2 diabetes mellitus without complication, without long-term current use of insulin (H)  Last A1c up a bit to 7.5%.  Had discussed recommitting to some lifestyle changes a few months ago, unfortunately dealing with wife's new cancer diagnosis since then.  He is aware that A1c likely would be up, declines any adjustments or labs.  Will continue current regimen for now.  - Hemoglobin A1c; Future  - Basic metabolic panel; Future  - TSH with free T4 reflex; Future  - Albumin Random Urine Quantitative with Creat Ratio; Future  - Metformin 1000 mg twice daily  - Lipitor 10 mg daily    Pure hypercholesterolemia  Lipids previously controlled on statin therapy.  Due for annual screening with next lab draw.  - Lipitor 10 mg daily    Morbid obesity due to excess calories (H)  Discussed.  Good awareness but not his priority right now.  - Preliminary GLP conversation    Essential hypertension with goal blood pressure less than 140/90  Controlled today.  - TSH with free T4 reflex; Future  - Losartan 50 mg daily  - HCTZ 12.5 mg daily    Screening for malignant neoplasm of prostate  Due for PSA screen with next blood draw.  - PSA, screen; Future    BMI  Estimated body mass index is 45.71 kg/m  as calculated from the following:    Height as of this encounter: 1.829 m (6').    Weight as of this encounter: 152.9 kg (337 lb).   Weight management plan: Discussed healthy  diet and exercise guidelines    I spent a total of 35 minutes on the day of the visit.   Time spent by me today doing chart review, history and exam, documentation and further activities per the note    The longitudinal plan of care for the diagnosis(es)/condition(s) as documented were addressed during this visit. Due to the added complexity in care, I will continue to support Glen Rogers in the subsequent management and with ongoing continuity of care.    Follow-up 3 to 4 months.    Lindsey Carroll is a 59 year old, presenting for the following health issues:  Diabetes and Establish Care        3/12/2025     2:43 PM   Additional Questions   Roomed by BRAVO Bullock CMA   Accompanied by Self         3/12/2025     2:43 PM   Patient Reported Additional Medications   Patient reports taking the following new medications None     History of Present Illness       Diabetes:   He presents for follow up of diabetes.    He is not checking blood glucose.        He is concerned about other.    He is not experiencing numbness or burning in feet, excessive thirst, blurry vision, weight changes or redness, sores or blisters on feet. The patient has not had a diabetic eye exam in the last 12 months.          Hypertension: He presents for follow up of hypertension.  He does not check blood pressure  regularly outside of the clinic. Outside blood pressures have been over 140/90. He does not follow a low salt diet.     He eats 0-1 servings of fruits and vegetables daily.He consumes 0 sweetened beverage(s) daily.He exercises with enough effort to increase his heart rate 9 or less minutes per day.  He exercises with enough effort to increase his heart rate 3 or less days per week. He is missing 1 dose(s) of medications per week.  He is not taking prescribed medications regularly due to remembering to take.        -Previously followed with Dr. Schrader  -Here for rescheduled 4 months diabetes follow-up    -Unfortunately since last  visit with PCP, his wife was diagnosed with glioblastoma  -Just recently completed her 30 days of treatment and now awaiting follow-up imaging to assess response  -Understands that this is a terminal diagnosis  -Understandably this has been extremely challenging  -Has put his own health on the back burner  -Lots of appointments, lots of missed work, lots of stress  -Knows that his diabetes numbers will not be good  -Not interested in making any adjustments, wants to hold off on labs today    -Mentally/emotionally draining  -Has some good days, some bad days  -His days go as his wife's days go  -Discussed some support resources    Diabetes Follow-up  How often are you checking your blood sugar? Not at all  What concerns do you have today about your diabetes? None   Do you have any of these symptoms? (Select all that apply)  No numbness or tingling in feet.  No redness, sores or blisters on feet.  No complaints of excessive thirst.  No reports of blurry vision.  No significant changes to weight.  Have you had a diabetic eye exam in the last 12 months? No    BP Readings from Last 2 Encounters:   03/12/25 138/80   11/12/24 135/88     Hemoglobin A1C (%)   Date Value   11/12/2024 7.5 (H)   06/03/2024 6.5 (H)   02/10/2021 6.4 (H)   08/10/2020 6.4 (H)     LDL Cholesterol Calculated (mg/dL)   Date Value   01/29/2024 88   12/20/2022 114 (H)   02/10/2021 94   01/15/2020 115 (H)       Review of Systems  Constitutional, HEENT, cardiovascular, pulmonary, gi and gu systems are negative, except as otherwise noted.      Objective    /80   Pulse 96   Temp 97.5  F (36.4  C) (Tympanic)   Resp 18   Ht 1.829 m (6')   Wt (!) 152.9 kg (337 lb)   SpO2 98%   BMI 45.71 kg/m    Body mass index is 45.71 kg/m .  Physical Exam  Vitals reviewed.   Constitutional:       Appearance: Normal appearance.   HENT:      Head: Normocephalic and atraumatic.   Cardiovascular:      Rate and Rhythm: Normal rate and regular rhythm.      Heart  sounds: No murmur heard.  Pulmonary:      Effort: Pulmonary effort is normal.      Breath sounds: Normal breath sounds. No stridor. No wheezing, rhonchi or rales.   Musculoskeletal:         General: Normal range of motion.   Skin:     General: Skin is warm and dry.   Neurological:      General: No focal deficit present.      Mental Status: He is alert and oriented to person, place, and time.   Psychiatric:         Mood and Affect: Mood normal.         Behavior: Behavior normal.         Signed Electronically by: Alfredo Root MD

## 2025-03-09 ENCOUNTER — HEALTH MAINTENANCE LETTER (OUTPATIENT)
Age: 60
End: 2025-03-09

## 2025-03-12 ENCOUNTER — OFFICE VISIT (OUTPATIENT)
Dept: FAMILY MEDICINE | Facility: OTHER | Age: 60
End: 2025-03-12
Attending: STUDENT IN AN ORGANIZED HEALTH CARE EDUCATION/TRAINING PROGRAM
Payer: COMMERCIAL

## 2025-03-12 VITALS
OXYGEN SATURATION: 98 % | TEMPERATURE: 97.5 F | HEIGHT: 72 IN | HEART RATE: 96 BPM | DIASTOLIC BLOOD PRESSURE: 80 MMHG | BODY MASS INDEX: 42.66 KG/M2 | WEIGHT: 315 LBS | SYSTOLIC BLOOD PRESSURE: 138 MMHG | RESPIRATION RATE: 18 BRPM

## 2025-03-12 DIAGNOSIS — Z63.79 STRESS DUE TO ILLNESS OF FAMILY MEMBER: Primary | ICD-10-CM

## 2025-03-12 DIAGNOSIS — E11.9 TYPE 2 DIABETES MELLITUS WITHOUT COMPLICATION, WITHOUT LONG-TERM CURRENT USE OF INSULIN (H): ICD-10-CM

## 2025-03-12 DIAGNOSIS — Z12.5 SCREENING FOR MALIGNANT NEOPLASM OF PROSTATE: ICD-10-CM

## 2025-03-12 DIAGNOSIS — E66.01 MORBID OBESITY DUE TO EXCESS CALORIES (H): ICD-10-CM

## 2025-03-12 DIAGNOSIS — I10 ESSENTIAL HYPERTENSION WITH GOAL BLOOD PRESSURE LESS THAN 140/90: ICD-10-CM

## 2025-03-12 DIAGNOSIS — E78.00 PURE HYPERCHOLESTEROLEMIA: ICD-10-CM

## 2025-03-12 ASSESSMENT — PAIN SCALES - GENERAL: PAINLEVEL_OUTOF10: NO PAIN (0)

## 2025-04-09 ENCOUNTER — MYC REFILL (OUTPATIENT)
Dept: FAMILY MEDICINE | Facility: OTHER | Age: 60
End: 2025-04-09

## 2025-04-09 DIAGNOSIS — E11.9 TYPE 2 DIABETES MELLITUS WITHOUT COMPLICATION, WITHOUT LONG-TERM CURRENT USE OF INSULIN (H): ICD-10-CM

## 2025-04-09 DIAGNOSIS — I10 ESSENTIAL HYPERTENSION WITH GOAL BLOOD PRESSURE LESS THAN 140/90: ICD-10-CM

## 2025-04-09 DIAGNOSIS — E78.00 PURE HYPERCHOLESTEROLEMIA: ICD-10-CM

## 2025-04-09 NOTE — TELEPHONE ENCOUNTER
Cozaar 50 mg       Last Written Prescription Date:  01/29/2024  Last Fill Quantity: 90,   # refills: 3  Last Office Visit: 03/12/2025  Future Office visit:

## 2025-04-09 NOTE — TELEPHONE ENCOUNTER
Lipitor 10 mg       Last Written Prescription Date:  01/29/2024  Last Fill Quantity: 90,   # refills: 3  Last Office Visit: 03/12/2025  Future Office visit:

## 2025-04-09 NOTE — TELEPHONE ENCOUNTER
Hydrochlorothiazide 12.5 mg        Last Written Prescription Date:  01/29/2024  Last Fill Quantity: 90,   # refills: 3  Last Office Visit: 03/12/2025  Future Office visit:        [] : Resident [Time Spent: ___ minutes] : I have spent [unfilled] minutes of time on the encounter which excludes teaching and separately reported services.

## 2025-04-10 RX ORDER — HYDROCHLOROTHIAZIDE 12.5 MG/1
12.5 TABLET ORAL DAILY
Qty: 90 TABLET | Refills: 3 | Status: SHIPPED | OUTPATIENT
Start: 2025-04-10

## 2025-04-10 RX ORDER — ATORVASTATIN CALCIUM 10 MG/1
10 TABLET, FILM COATED ORAL DAILY
Qty: 90 TABLET | Refills: 3 | Status: SHIPPED | OUTPATIENT
Start: 2025-04-10

## 2025-04-10 RX ORDER — LOSARTAN POTASSIUM 50 MG/1
50 TABLET ORAL DAILY
Qty: 90 TABLET | Refills: 3 | Status: SHIPPED | OUTPATIENT
Start: 2025-04-10

## 2025-05-16 ENCOUNTER — MYC REFILL (OUTPATIENT)
Dept: FAMILY MEDICINE | Facility: OTHER | Age: 60
End: 2025-05-16

## 2025-05-16 DIAGNOSIS — M16.11 PRIMARY OSTEOARTHRITIS OF RIGHT HIP: ICD-10-CM

## 2025-05-16 DIAGNOSIS — Z96.642 HISTORY OF TOTAL LEFT HIP ARTHROPLASTY: ICD-10-CM

## 2025-05-16 DIAGNOSIS — M79.662 PAIN OF LEFT LOWER LEG: ICD-10-CM

## 2025-05-19 RX ORDER — DICLOFENAC SODIUM 75 MG/1
75 TABLET, DELAYED RELEASE ORAL 2 TIMES DAILY PRN
Qty: 60 TABLET | Refills: 1 | Status: SHIPPED | OUTPATIENT
Start: 2025-05-19

## 2025-05-19 NOTE — TELEPHONE ENCOUNTER
Voltaren 75 mg       Last Written Prescription Date:  05/06/2024  Last Fill Quantity: 60,   # refills: 4  Last Office Visit: 03/12/2025  Future Office visit:    Next 5 appointments (look out 90 days)      Jul 14, 2025 2:30 PM  (Arrive by 2:15 PM)  Provider Visit with Alfredo Root MD  [facility] [place] [facility] ([facility] [place] ) [address]  [place] [address]  [phone]

## 2025-06-01 ENCOUNTER — HEALTH MAINTENANCE LETTER (OUTPATIENT)
Age: 60
End: 2025-06-01

## 2025-07-09 NOTE — PROGRESS NOTES
Assessment & Plan     Grief reaction  Glen's wife sadly passed away very recently after a hoffmann with brain cancer, shared my condolences.  He is doing about the best that he can given the situation.  As discussed at previous visit, he had been putting his own needs and health care on hold while caring for his wife.  Ready to reset now work on some lifestyle changes, trying to eat healthier.  First day back to work today.  Long discussion today, will continue to support as able.    Type 2 diabetes mellitus without complication, without long-term current use of insulin (H)  A1c up-to-date at 8.1%.  No changes today given the circumstances he has been dealing with, start with repeat rating to lifestyle management.  Did have full GLP discussion today and would be an excellent candidate although cost has been prohibitive in the past.  - CBC with platelets and differential; Future  - Comprehensive metabolic panel (BMP + Alb, Alk Phos, ALT, AST, Total. Bili, TP); Future  - Hemoglobin A1c; Future  - Albumin Random Urine Quantitative with Creat Ratio; Future  - Statin/ARB  - Metformin XR 1000 mg twice daily  - Recommended annual eye exam  - Diabetic foot exam 7/14/2025    Morbid obesity due to excess calories (H)  Going to work on lifestyle changes but also full GLP discussion today.  Can check with insurance to see if any GLP is on formulary and what the price would be.    Pure hypercholesterolemia  Lipid profile at goal today.  - Lipid Profile (Chol, Trig, HDL, LDL calc); Future  - Lipitor 10 mg    Essential hypertension with goal blood pressure less than 140/90  Stable, and normotensive again today.  Working on lifestyle changes, anticipate BP would come down with weight loss as well.  - Comprehensive metabolic panel (BMP + Alb, Alk Phos, ALT, AST, Total. Bili, TP); Future  - HCTZ 12.5 mg  - Losartan 50 mg    Screening examination for infectious disease  - HIV Antigen Antibody Combo; Future  - Hepatitis C Screen  Reflex to HCV RNA Quant and Genotype; Future    Screening for malignant neoplasm of prostate  - PSA, screen; Future    I spent a total of 34 minutes on the day of the visit.   Time spent by me today doing chart review, history and exam, documentation and further activities per the note    The longitudinal plan of care for the diagnosis(es)/condition(s) as documented were addressed during this visit. Due to the added complexity in care, I will continue to support Glen Rogers in the subsequent management and with ongoing continuity of care.    Follow-up 4 to 5 months for annual physical.    Lindsey Carroll is a 59 year old, presenting for the following health issues:  Diabetes and Hypertension    History of Present Illness       Back Pain:  He presents for follow up of back pain. Patient's back pain is a chronic problem.  Location of back pain:  Left middle of back  Description of back pain: dull ache and shooting  Back pain spreads: left buttocks    Since patient first noticed back pain, pain is: always present, but gets better and worse  Does back pain interfere with his job:  Yes       Diabetes:   He presents for follow up of diabetes.    He is not checking blood glucose.        He is concerned about other.    He is not experiencing numbness or burning in feet, excessive thirst, blurry vision, weight changes or redness, sores or blisters on feet. The patient has not had a diabetic eye exam in the last 12 months.          Hypertension: He presents for follow up of hypertension.  He does not check blood pressure  regularly outside of the clinic. Outside blood pressures have been over 140/90. He does not follow a low salt diet.     He eats 0-1 servings of fruits and vegetables daily.He consumes 0 sweetened beverage(s) daily.He exercises with enough effort to increase his heart rate 9 or less minutes per day.  He exercises with enough effort to increase his heart rate 3 or less days per week. He is missing 3  dose(s) of medications per week.  He is not taking prescribed medications regularly due to other.        Diabetes Follow-up  How often are you checking your blood sugar? Not at all  What concerns do you have today about your diabetes? None   Do you have any of these symptoms? (Select all that apply)  No numbness or tingling in feet.  No redness, sores or blisters on feet.  No complaints of excessive thirst.  No reports of blurry vision.  No significant changes to weight.  Have you had a diabetic eye exam in the last 12 months? No    His wife sadly passed away recently brain cancer  Had the  last week  Acknowledges that he put his own health and needs on the back burner for the past year  Doing okay, trying to adjust to new life, new routine  First day back to work today  Shifting gears to start taking care of himself once again  Try to use this time to reset  Has been working on carb diet, has been challenging when many people are dropping off foods  Full GLP conversation today, Rolle has been a big issue in the past    BP Readings from Last 2 Encounters:   25 138/78   25 138/80     Hemoglobin A1C (%)   Date Value   2024 7.5 (H)   2024 6.5 (H)   02/10/2021 6.4 (H)   08/10/2020 6.4 (H)     LDL Cholesterol Calculated (mg/dL)   Date Value   2024 88   2022 114 (H)   02/10/2021 94   01/15/2020 115 (H)             Hypertension Follow-up  Do you check your blood pressure regularly outside of the clinic? No   Are you following a low salt diet? No  Are your blood pressures ever more than 140 on the top number (systolic) OR more   than 90 on the bottom number (diastolic), for example 140/90? Yes, it has been awhile    BP Readings from Last 2 Encounters:   25 138/78   25 138/80       Review of Systems  Constitutional, HEENT, cardiovascular, pulmonary, gi and gu systems are negative, except as otherwise noted.      Objective    /78 (BP Location: Left arm, Patient  Position: Sitting, Cuff Size: Adult Large)   Pulse 85   Temp 97.6  F (36.4  C) (Tympanic)   Resp 16   Ht 1.829 m (6')   Wt (!) 152.5 kg (336 lb 1.6 oz)   SpO2 98%   BMI 45.58 kg/m    Body mass index is 45.58 kg/m .  Physical Exam  Vitals reviewed.   Constitutional:       Appearance: Normal appearance.   HENT:      Head: Normocephalic and atraumatic.   Cardiovascular:      Rate and Rhythm: Normal rate and regular rhythm.      Heart sounds: No murmur heard.  Pulmonary:      Effort: Pulmonary effort is normal.      Breath sounds: Normal breath sounds. No stridor. No wheezing, rhonchi or rales.   Musculoskeletal:         General: Normal range of motion.   Skin:     General: Skin is warm and dry.   Neurological:      General: No focal deficit present.      Mental Status: He is alert and oriented to person, place, and time.   Psychiatric:         Mood and Affect: Mood normal.         Behavior: Behavior normal.          Signed Electronically by: Alfredo Root MD

## 2025-07-14 ENCOUNTER — LAB (OUTPATIENT)
Dept: LAB | Facility: OTHER | Age: 60
End: 2025-07-14
Attending: STUDENT IN AN ORGANIZED HEALTH CARE EDUCATION/TRAINING PROGRAM
Payer: COMMERCIAL

## 2025-07-14 ENCOUNTER — OFFICE VISIT (OUTPATIENT)
Dept: FAMILY MEDICINE | Facility: OTHER | Age: 60
End: 2025-07-14
Attending: STUDENT IN AN ORGANIZED HEALTH CARE EDUCATION/TRAINING PROGRAM
Payer: COMMERCIAL

## 2025-07-14 VITALS
WEIGHT: 315 LBS | OXYGEN SATURATION: 98 % | HEART RATE: 85 BPM | RESPIRATION RATE: 16 BRPM | BODY MASS INDEX: 42.66 KG/M2 | TEMPERATURE: 97.6 F | DIASTOLIC BLOOD PRESSURE: 78 MMHG | HEIGHT: 72 IN | SYSTOLIC BLOOD PRESSURE: 138 MMHG

## 2025-07-14 DIAGNOSIS — Z12.5 SCREENING FOR MALIGNANT NEOPLASM OF PROSTATE: ICD-10-CM

## 2025-07-14 DIAGNOSIS — E11.9 TYPE 2 DIABETES MELLITUS WITHOUT COMPLICATION, WITHOUT LONG-TERM CURRENT USE OF INSULIN (H): ICD-10-CM

## 2025-07-14 DIAGNOSIS — I10 ESSENTIAL HYPERTENSION WITH GOAL BLOOD PRESSURE LESS THAN 140/90: ICD-10-CM

## 2025-07-14 DIAGNOSIS — E66.01 MORBID OBESITY DUE TO EXCESS CALORIES (H): ICD-10-CM

## 2025-07-14 DIAGNOSIS — Z11.9 SCREENING EXAMINATION FOR INFECTIOUS DISEASE: ICD-10-CM

## 2025-07-14 DIAGNOSIS — E78.00 PURE HYPERCHOLESTEROLEMIA: ICD-10-CM

## 2025-07-14 DIAGNOSIS — F43.20 GRIEF REACTION: Primary | ICD-10-CM

## 2025-07-14 LAB
ALBUMIN SERPL BCG-MCNC: 4.4 G/DL (ref 3.5–5.2)
ALP SERPL-CCNC: 80 U/L (ref 40–150)
ALT SERPL W P-5'-P-CCNC: 44 U/L (ref 0–70)
ANION GAP SERPL CALCULATED.3IONS-SCNC: 14 MMOL/L (ref 7–15)
AST SERPL W P-5'-P-CCNC: 27 U/L (ref 0–45)
BASOPHILS # BLD AUTO: 0 10E3/UL (ref 0–0.2)
BASOPHILS NFR BLD AUTO: 0 %
BILIRUB SERPL-MCNC: 0.3 MG/DL
BUN SERPL-MCNC: 19 MG/DL (ref 8–23)
CALCIUM SERPL-MCNC: 10.2 MG/DL (ref 8.8–10.4)
CHLORIDE SERPL-SCNC: 101 MMOL/L (ref 98–107)
CHOLEST SERPL-MCNC: 147 MG/DL
CREAT SERPL-MCNC: 0.85 MG/DL (ref 0.67–1.17)
CREAT UR-MCNC: 290.2 MG/DL
EGFRCR SERPLBLD CKD-EPI 2021: >90 ML/MIN/1.73M2
EOSINOPHIL # BLD AUTO: 0.1 10E3/UL (ref 0–0.7)
EOSINOPHIL NFR BLD AUTO: 2 %
ERYTHROCYTE [DISTWIDTH] IN BLOOD BY AUTOMATED COUNT: 13.5 % (ref 10–15)
EST. AVERAGE GLUCOSE BLD GHB EST-MCNC: 186 MG/DL
FASTING STATUS PATIENT QL REPORTED: NO
FASTING STATUS PATIENT QL REPORTED: NO
GLUCOSE SERPL-MCNC: 138 MG/DL (ref 70–99)
HBA1C MFR BLD: 8.1 %
HCO3 SERPL-SCNC: 23 MMOL/L (ref 22–29)
HCT VFR BLD AUTO: 38.8 % (ref 40–53)
HDLC SERPL-MCNC: 46 MG/DL
HGB BLD-MCNC: 13.5 G/DL (ref 13.3–17.7)
IMM GRANULOCYTES # BLD: 0 10E3/UL
IMM GRANULOCYTES NFR BLD: 0 %
LDLC SERPL CALC-MCNC: 86 MG/DL
LYMPHOCYTES # BLD AUTO: 2.6 10E3/UL (ref 0.8–5.3)
LYMPHOCYTES NFR BLD AUTO: 35 %
MCH RBC QN AUTO: 29.8 PG (ref 26.5–33)
MCHC RBC AUTO-ENTMCNC: 34.8 G/DL (ref 31.5–36.5)
MCV RBC AUTO: 86 FL (ref 78–100)
MICROALBUMIN UR-MCNC: 18.4 MG/L
MICROALBUMIN/CREAT UR: 6.34 MG/G CR (ref 0–17)
MONOCYTES # BLD AUTO: 0.7 10E3/UL (ref 0–1.3)
MONOCYTES NFR BLD AUTO: 9 %
NEUTROPHILS # BLD AUTO: 3.9 10E3/UL (ref 1.6–8.3)
NEUTROPHILS NFR BLD AUTO: 53 %
NONHDLC SERPL-MCNC: 101 MG/DL
NRBC # BLD AUTO: 0 10E3/UL
NRBC BLD AUTO-RTO: 0 /100
PLATELET # BLD AUTO: 249 10E3/UL (ref 150–450)
POTASSIUM SERPL-SCNC: 4 MMOL/L (ref 3.4–5.3)
PROT SERPL-MCNC: 7.2 G/DL (ref 6.4–8.3)
PSA SERPL DL<=0.01 NG/ML-MCNC: 0.21 NG/ML (ref 0–4.5)
RBC # BLD AUTO: 4.53 10E6/UL (ref 4.4–5.9)
SODIUM SERPL-SCNC: 138 MMOL/L (ref 135–145)
TRIGL SERPL-MCNC: 77 MG/DL
WBC # BLD AUTO: 7.4 10E3/UL (ref 4–11)

## 2025-07-14 PROCEDURE — 85025 COMPLETE CBC W/AUTO DIFF WBC: CPT

## 2025-07-14 PROCEDURE — 87389 HIV-1 AG W/HIV-1&-2 AB AG IA: CPT

## 2025-07-14 PROCEDURE — 82570 ASSAY OF URINE CREATININE: CPT

## 2025-07-14 PROCEDURE — 3075F SYST BP GE 130 - 139MM HG: CPT | Performed by: STUDENT IN AN ORGANIZED HEALTH CARE EDUCATION/TRAINING PROGRAM

## 2025-07-14 PROCEDURE — 83036 HEMOGLOBIN GLYCOSYLATED A1C: CPT

## 2025-07-14 PROCEDURE — 1125F AMNT PAIN NOTED PAIN PRSNT: CPT | Performed by: STUDENT IN AN ORGANIZED HEALTH CARE EDUCATION/TRAINING PROGRAM

## 2025-07-14 PROCEDURE — 3052F HG A1C>EQUAL 8.0%<EQUAL 9.0%: CPT

## 2025-07-14 PROCEDURE — 3052F HG A1C>EQUAL 8.0%<EQUAL 9.0%: CPT | Performed by: STUDENT IN AN ORGANIZED HEALTH CARE EDUCATION/TRAINING PROGRAM

## 2025-07-14 PROCEDURE — 86803 HEPATITIS C AB TEST: CPT

## 2025-07-14 PROCEDURE — 99214 OFFICE O/P EST MOD 30 MIN: CPT | Performed by: STUDENT IN AN ORGANIZED HEALTH CARE EDUCATION/TRAINING PROGRAM

## 2025-07-14 PROCEDURE — G2211 COMPLEX E/M VISIT ADD ON: HCPCS | Performed by: STUDENT IN AN ORGANIZED HEALTH CARE EDUCATION/TRAINING PROGRAM

## 2025-07-14 PROCEDURE — 3078F DIAST BP <80 MM HG: CPT | Performed by: STUDENT IN AN ORGANIZED HEALTH CARE EDUCATION/TRAINING PROGRAM

## 2025-07-14 PROCEDURE — 80053 COMPREHEN METABOLIC PANEL: CPT

## 2025-07-14 PROCEDURE — 80061 LIPID PANEL: CPT

## 2025-07-14 PROCEDURE — 3048F LDL-C <100 MG/DL: CPT

## 2025-07-14 PROCEDURE — 82043 UR ALBUMIN QUANTITATIVE: CPT

## 2025-07-14 PROCEDURE — 3048F LDL-C <100 MG/DL: CPT | Performed by: STUDENT IN AN ORGANIZED HEALTH CARE EDUCATION/TRAINING PROGRAM

## 2025-07-14 PROCEDURE — 36415 COLL VENOUS BLD VENIPUNCTURE: CPT

## 2025-07-14 PROCEDURE — G0103 PSA SCREENING: HCPCS

## 2025-07-14 ASSESSMENT — PAIN SCALES - GENERAL: PAINLEVEL_OUTOF10: MODERATE PAIN (5)

## 2025-07-15 LAB
HCV AB SERPL QL IA: NONREACTIVE
HIV 1+2 AB+HIV1 P24 AG SERPL QL IA: NONREACTIVE

## 2025-08-12 ENCOUNTER — TRANSFERRED RECORDS (OUTPATIENT)
Dept: HEALTH INFORMATION MANAGEMENT | Facility: CLINIC | Age: 60
End: 2025-08-12

## 2025-08-12 LAB — RETINOPATHY: NEGATIVE

## 2025-08-26 ENCOUNTER — MYC REFILL (OUTPATIENT)
Dept: FAMILY MEDICINE | Facility: OTHER | Age: 60
End: 2025-08-26

## 2025-08-26 DIAGNOSIS — E11.9 TYPE 2 DIABETES MELLITUS WITHOUT COMPLICATION, WITHOUT LONG-TERM CURRENT USE OF INSULIN (H): ICD-10-CM

## 2025-08-26 RX ORDER — METFORMIN HYDROCHLORIDE 500 MG/1
1000 TABLET, EXTENDED RELEASE ORAL 2 TIMES DAILY WITH MEALS
Qty: 360 TABLET | Refills: 3 | Status: SHIPPED | OUTPATIENT
Start: 2025-08-26

## (undated) DEVICE — TAPE-DURAPORE 2 INCH

## (undated) DEVICE — GLV-8.5 PROTEXIS PI BLUE W/NEU-THERA LF/PF

## (undated) DEVICE — TAPE-MICROFOAM 4 INCH

## (undated) DEVICE — SUTURE-VICRYL #1 CP-1 VIOLET J468H

## (undated) DEVICE — FLEXIBLE DRILL-25MM

## (undated) DEVICE — IRRIGATION-NACL 3000ML (BAG)

## (undated) DEVICE — CONNECTOR ERBEFLO 2 PORT 20325-215

## (undated) DEVICE — SUTURE-VICRYL #1 CT-1 UNDYED J261H

## (undated) DEVICE — GLV-8.0 PROTEXIS PI BLUE W/NEU-THERA LF/PF

## (undated) DEVICE — BETADINE 5% STERILE OPHTHALMIC SOLUTION 1 OZ.

## (undated) DEVICE — FORCEP-COLON BIOPSY LARGE W/NEEDLE 240CM

## (undated) DEVICE — APPLICATOR-CHLORAPREP 26ML TINTED CHG 2%+ 70% IPA-SURGICAL

## (undated) DEVICE — SCREW-SPHERICAL HEAD CANCELLOUS 25MM
Type: IMPLANTABLE DEVICE | Site: HIP | Status: NON-FUNCTIONAL
Removed: 2020-03-03

## (undated) DEVICE — DRAPE-VERTICAL ISOLATION

## (undated) DEVICE — SOL WATER IRRIG 1000ML BOTTLE 2F7114

## (undated) DEVICE — KIT-HANA OR POSITIONING

## (undated) DEVICE — Device

## (undated) DEVICE — PULSE LAVAGE IRRIGATION SYSTEM

## (undated) DEVICE — IRRIGATION-H2O 1000ML

## (undated) DEVICE — IRRIGATION-NACL 1000ML

## (undated) DEVICE — THREADED HOLE COVER-REFLECTION
Type: IMPLANTABLE DEVICE | Site: HIP | Status: NON-FUNCTIONAL
Removed: 2020-03-03

## (undated) DEVICE — BDG-COBAN 6 INCH

## (undated) DEVICE — SYRINGE-20CC LUER LOCK

## (undated) DEVICE — PACK-HIP-CUSTOM

## (undated) DEVICE — DRAPE-U DRAPE-CLEAR 47" X 51"

## (undated) DEVICE — BLADE-SAGITTAL 18MM X 90MM X 1.27MM

## (undated) DEVICE — CAUTERY PAD-POLYHESIVE II ADULT

## (undated) DEVICE — SCD SLEEVE-KNEE REG.

## (undated) DEVICE — DRAPE-BAR 72" X 112" ORTHOARTS

## (undated) DEVICE — TUBING SUCTION 20FT N620A

## (undated) DEVICE — DRSG-AQUACEL AG 3.5" X 14" SURGICAL

## (undated) DEVICE — STAPLER-SKIN 35 WIDE STAPLES

## (undated) DEVICE — BONE WAX W31G

## (undated) DEVICE — NDL-18G 1 1/2" NON-SAFETY

## (undated) DEVICE — AQUAMANTYS 6.0MM SEALER PROBE

## (undated) DEVICE — SUCTION TUBE-YANKAUR

## (undated) DEVICE — GLV-8.0 ORTHO PROTEXIS PI LF/PF

## (undated) DEVICE — DRAPE-U DRAPE SPLIT SHEET 72" X 122"

## (undated) DEVICE — SUTURE-VICRYL 2-0 CP-1 VCP266H

## (undated) DEVICE — CANISTER-SUCTION 2000CC

## (undated) DEVICE — LABEL-STERILE PREPRINTED FOR OR

## (undated) DEVICE — GLV-8.5 ORTHO PROTEXIS PI LF/PF

## (undated) DEVICE — CAUTERY-EXTENDED 4" EDGE COATED BLADE

## (undated) DEVICE — LIGHT HANDLE COVER

## (undated) RX ORDER — PROPOFOL 10 MG/ML
INJECTION, EMULSION INTRAVENOUS
Status: DISPENSED
Start: 2020-03-03

## (undated) RX ORDER — FENTANYL CITRATE 50 UG/ML
INJECTION, SOLUTION INTRAMUSCULAR; INTRAVENOUS
Status: DISPENSED
Start: 2020-03-03

## (undated) RX ORDER — ONDANSETRON 2 MG/ML
INJECTION INTRAMUSCULAR; INTRAVENOUS
Status: DISPENSED
Start: 2020-03-03

## (undated) RX ORDER — DEXAMETHASONE SODIUM PHOSPHATE 10 MG/ML
INJECTION, SOLUTION INTRAMUSCULAR; INTRAVENOUS
Status: DISPENSED
Start: 2020-03-03